# Patient Record
Sex: MALE | Race: WHITE | NOT HISPANIC OR LATINO | Employment: UNEMPLOYED | URBAN - METROPOLITAN AREA
[De-identification: names, ages, dates, MRNs, and addresses within clinical notes are randomized per-mention and may not be internally consistent; named-entity substitution may affect disease eponyms.]

---

## 2017-08-11 ENCOUNTER — OFFICE VISIT (OUTPATIENT)
Dept: RETAIL CLINIC | Facility: CLINIC | Age: 47
End: 2017-08-11

## 2017-08-11 DIAGNOSIS — Z02.83 ENCOUNTER FOR DRUG SCREENING: Primary | ICD-10-CM

## 2017-09-11 ENCOUNTER — OFFICE VISIT (OUTPATIENT)
Dept: RETAIL CLINIC | Facility: CLINIC | Age: 47
End: 2017-09-11

## 2017-09-11 DIAGNOSIS — Z02.1 PRE-EMPLOYMENT DRUG SCREENING: Primary | ICD-10-CM

## 2018-04-21 PROBLEM — S89.92XA KNEE INJURY, LEFT, INITIAL ENCOUNTER: Status: ACTIVE | Noted: 2018-04-21

## 2018-04-21 PROBLEM — S93.401A SPRAIN OF RIGHT ANKLE: Status: ACTIVE | Noted: 2018-04-21

## 2018-04-25 ENCOUNTER — OFFICE VISIT (OUTPATIENT)
Dept: ORTHOPEDIC SURGERY | Facility: CLINIC | Age: 48
End: 2018-04-25

## 2018-04-25 VITALS
SYSTOLIC BLOOD PRESSURE: 130 MMHG | HEIGHT: 74 IN | DIASTOLIC BLOOD PRESSURE: 88 MMHG | WEIGHT: 259.92 LBS | HEART RATE: 85 BPM | BODY MASS INDEX: 33.36 KG/M2

## 2018-04-25 DIAGNOSIS — M25.562 ACUTE PAIN OF LEFT KNEE: Primary | ICD-10-CM

## 2018-04-25 PROCEDURE — 99204 OFFICE O/P NEW MOD 45 MIN: CPT | Performed by: ORTHOPAEDIC SURGERY

## 2018-04-25 NOTE — PROGRESS NOTES
[facility] [place] [facility]opaedic Surgery Clinic Note    Subjective     Chief Complaint   Patient presents with   • Left Knee - Pain        HPI      Jeffery Olivas is a 48 y.o. male.  He complains a left knee pain.  He had a fall at home.  In his backyard.  He is unable to bear weight on the left knee.  This happened April 21.  He's tried ibuprofen.  His pain is aching burning and stabbing        History reviewed. No pertinent past medical history.   Past Surgical History:   Procedure Laterality Date   • ACHILLES TENDON SURGERY     • KNEE MENISCAL REPAIR Right       History reviewed. No pertinent family history.  Social History     Social History   • Marital status: Unknown     Spouse name: N/A   • Number of children: N/A   • Years of education: N/A     Occupational History   • Not on file.     Social History Main Topics   • Smoking status: Never Smoker   • Smokeless tobacco: Never Used   • Alcohol use Yes   • Drug use: Unknown   • Sexual activity: Defer     Other Topics Concern   • Not on file     Social History Narrative   • No narrative on file      Current Outpatient Prescriptions on File Prior to Visit   Medication Sig Dispense Refill   • ibuprofen (ADVIL,MOTRIN) 800 MG tablet Take 1 tablet by mouth Every 6 (Six) Hours As Needed for Moderate Pain . 30 tablet 0     No current facility-administered medications on file prior to visit.       No Known Allergies     The following portions of the patient's history were reviewed and updated as appropriate: allergies, current medications, past family history, past medical history, past social history, past surgical history and problem list.    Review of Systems   Constitutional: Negative.    HENT: Negative.    Eyes: Negative.    Respiratory: Negative.    Cardiovascular: Negative.    Gastrointestinal: Negative.    Endocrine: Negative.    Genitourinary: Negative.    Musculoskeletal: Positive for arthralgias.   Skin: Negative.    Allergic/Immunologic: Negative.    Neurological: Negative.   "  Hematological: Negative.    Psychiatric/Behavioral: Negative.         Objective      Physical Exam  /88   Pulse 85   Ht 188 cm (74\")   Wt 118 kg (259 lb 14.8 oz)   BMI 33.37 kg/m²     Body mass index is 33.37 kg/m².        GENERAL APPEARANCE: awake, alert & oriented x 3, in no acute distress and well developed, well nourished  PSYCH: normal mood and affect  LUNGS:  breathing nonlabored, no wheezing  EYES: sclera anicteric, pupils equal  CARDIOVASCULAR: palpable pulses dorsalis pedis, palpable posterior tibial bilaterally. Capillary refill less than 2 seconds  INTEGUMENTARY: skin intact, no clubbing, cyanosis  NEUROLOGIC: Unable to bear weight on the left knee but he has normal reflexes and sensation             Ortho Exam  Peripheral Vascular:    Upper Extremity:   Inspection:  Left--no cyanotic nail beds Right--no cyanotic nail beds   Bilateral:  Pink nail beds with brisk capillary refill   Palpation:  Bilateral radial pulse normal    Musculoskeletal:  Global Assessment:  Overall assessment of Lower Extremity Muscle Strength and Tone:  Left quadriceps--5/5   Left hamstrings--5/5       Left tibialis anterior--5/5  Left gastroc-soleus--5/5  Left EHL--5/5      Lower Extremity:  Knee/Patella:  No digital clubbing or cyanosis.    Examination of left knee reveals:  Normal deep tendon reflexes, coordination, strength, tone, sensation.  No known fractures or deformities.    Inspection and Palpation:    Left knee:  Tenderness:  Medial knee  Effusion:  none  Crepitus:  none  Pulses:  2+  Ecchymosis:  None  Warmth:  None       ROM:  Right:  Extension:0    Flexion:135  Left:  Extension:0     Flexion:135    Instability:    Left:  Lachman Test:  Negative, Varus stress test negative, Valgus stress test negative   Anterior Drawer Test:  Negative, Posterior Drawer Test:  Negative      Deformities/Malalignments/Discrepancies:    Left:  none  Right:  none    Functional Testing:    Left:  Aleksey's test:  " Negative  Patella grind test:  Negative  Q-angle:  Normal  Apprehension Sign:  Negative    Imaging/Studies  Imaging Results (last 7 days)     ** No results found for the last 168 hours. **      I reviewed his x-rays which are negative    Assessment/Plan        ICD-10-CM ICD-9-CM   1. Acute pain of left knee M25.562 719.46       Orders Placed This Encounter   Procedures   • MRI Knee Left Without Contrast    Since he is unable to bear weight on the left knee removed an MRI.  I ordered crutches and he wants forearm crutches.  This is a personal injury and I will write a work note for him to be off work    Medical Decision Making  Management Options : over-the-counter medicine  Data/Risk: radiology tests and independent visualization of imaging, lab tests, or EMG/NCV    Jett Sims MD  04/25/18  8:33 AM

## 2018-04-26 ENCOUNTER — HOSPITAL ENCOUNTER (OUTPATIENT)
Dept: MRI IMAGING | Facility: HOSPITAL | Age: 48
Discharge: HOME | End: 2018-04-26
Attending: ORTHOPAEDIC SURGERY | Admitting: ORTHOPAEDIC SURGERY

## 2018-04-26 DIAGNOSIS — M25.562 ACUTE PAIN OF LEFT KNEE: ICD-10-CM

## 2018-04-26 PROCEDURE — 73721 MRI JNT OF LWR EXTRE W/O DYE: CPT

## 2018-04-30 ENCOUNTER — OFFICE VISIT (OUTPATIENT)
Dept: ORTHOPEDIC SURGERY | Facility: CLINIC | Age: 48
End: 2018-04-30

## 2018-04-30 VITALS
HEART RATE: 82 BPM | HEIGHT: 74 IN | WEIGHT: 260.14 LBS | DIASTOLIC BLOOD PRESSURE: 87 MMHG | BODY MASS INDEX: 33.39 KG/M2 | SYSTOLIC BLOOD PRESSURE: 135 MMHG

## 2018-04-30 DIAGNOSIS — S83.232D COMPLEX TEAR OF MEDIAL MENISCUS OF LEFT KNEE AS CURRENT INJURY, SUBSEQUENT ENCOUNTER: Primary | ICD-10-CM

## 2018-04-30 DIAGNOSIS — M94.20 CHONDROMALACIA: ICD-10-CM

## 2018-04-30 DIAGNOSIS — S83.272D COMPLEX TEAR OF LATERAL MENISCUS OF LEFT KNEE AS CURRENT INJURY, SUBSEQUENT ENCOUNTER: ICD-10-CM

## 2018-04-30 PROCEDURE — 99214 OFFICE O/P EST MOD 30 MIN: CPT | Performed by: ORTHOPAEDIC SURGERY

## 2018-04-30 NOTE — PROGRESS NOTES
[facility] [place] Orthopaedic Surgery Clinic Note    Subjective     Chief Complaint   Patient presents with   • Left Knee - Follow-up     1 week follow up for MRI results        HPI      Jeffery Olivas is a 48 y.o. male.  He had personal injury 9 days ago.  Complains left knee pain.  He walks with a limp.  Pain is 9 out of 10.  It is aching burning stabbing.  He works at [employer].        History reviewed. No pertinent past medical history.   Past Surgical History:   Procedure Laterality Date   • ACHILLES TENDON SURGERY     • KNEE MENISCAL REPAIR Right       History reviewed. No pertinent family history.  Social History     Social History   • Marital status: Unknown     Spouse name: N/A   • Number of children: N/A   • Years of education: N/A     Occupational History   • Not on file.     Social History Main Topics   • Smoking status: Never Smoker   • Smokeless tobacco: Never Used   • Alcohol use Yes   • Drug use: Unknown   • Sexual activity: Defer     Other Topics Concern   • Not on file     Social History Narrative   • No narrative on file      Current Outpatient Prescriptions on File Prior to Visit   Medication Sig Dispense Refill   • ibuprofen (ADVIL,MOTRIN) 800 MG tablet Take 1 tablet by mouth Every 6 (Six) Hours As Needed for Moderate Pain . 30 tablet 0     No current facility-administered medications on file prior to visit.       No Known Allergies     The following portions of the patient's history were reviewed and updated as appropriate: allergies, current medications, past family history, past medical history, past social history, past surgical history and problem list.    Review of Systems   Constitutional: Negative.    HENT: Negative.    Eyes: Negative.    Respiratory: Negative.    Cardiovascular: Negative.    Gastrointestinal: Negative.    Endocrine: Negative.    Genitourinary: Negative.    Musculoskeletal: Positive for arthralgias.   Skin: Negative.    Allergic/Immunologic: Negative.    Neurological: Negative.   "  Hematological: Negative.    Psychiatric/Behavioral: Negative.         Objective      Physical Exam  /87   Pulse 82   Ht 188 cm (74.02\")   Wt 118 kg (260 lb 2.3 oz)   BMI 33.39 kg/m²     Body mass index is 33.39 kg/m².        GENERAL APPEARANCE: awake, alert & oriented x 3, in no acute distress and well developed, well nourished  PSYCH: normal mood and affect  LUNGS:  breathing nonlabored, no wheezing  EYES: sclera anicteric, pupils equal  CARDIOVASCULAR: palpable pulses dorsalis pedis, palpable posterior tibial bilaterally. Capillary refill less than 2 seconds  INTEGUMENTARY: skin intact, no clubbing, cyanosis  NEUROLOGIC:  He limps on his left leg but has normal reflexes and sensation             Ortho Exam  Peripheral Vascular:    Upper Extremity:   Inspection:  Left--no cyanotic nail beds Right--no cyanotic nail beds   Bilateral:  Pink nail beds with brisk capillary refill   Palpation:  Bilateral radial pulse normal    Musculoskeletal:  Global Assessment:  Overall assessment of Lower Extremity Muscle Strength and Tone:  Left quadriceps--5/5   Left hamstrings--5/5       Left tibialis anterior--5/5  Left gastroc-soleus--5/5  Left EHL--5/5      Lower Extremity:  Knee/Patella:  No digital clubbing or cyanosis.    Examination of left knee reveals:  Normal deep tendon reflexes, coordination, strength, tone, sensation.  No known fractures or deformities.    Inspection and Palpation:    Left knee:  Tenderness:  Medial joint line with antalgic gait  Effusion:  none  Crepitus:  none  Pulses:  2+  Ecchymosis:  None  Warmth:  None       ROM:  Right:  Extension:0    Flexion:135  Left:  Extension:0     Flexion:135    Instability:    Left:  Lachman Test:  Negative, Varus stress test negative, Valgus stress test negative   Anterior Drawer Test:  Negative, Posterior Drawer Test:  Negative      Deformities/Malalignments/Discrepancies:    Left:  none  Right:  none    Functional Testing:    Left:  Aleksey's test:  " Positive  Patella grind test:  Negative  Q-angle:  Normal  Apprehension Sign:  Negative    Imaging/Studies  Imaging Results (last 7 days)     ** No results found for the last 168 hours. **        I reviewed the MRI which shows anterior horn and posterior horn medial meniscus tears with lateral meniscus tear and chondromalacia  Assessment/Plan        ICD-10-CM ICD-9-CM   1. Complex tear of medial meniscus of left knee as current injury, subsequent encounter S83.232D V58.89   2. Complex tear of lateral meniscus of left knee as current injury, subsequent encounter S83.272D V58.89   3. Chondromalacia M94.20 733.92       I recommend left knee arthroscopy with partial medial and lateral meniscectomies.  He is off work.  He is on crutches.  Treatment options and alternatives were discussed with patient.  Surgical versus non surgical treatment options were discussed as well.    We reviewed the nature of the planned procedure including the risks, benefits and potential complications.  Understanding was expressed and the decision was made to proceed with surgery.      Medical Decision Making  Management Options : over-the-counter medicine and major surgery with risk factors  Data/Risk: radiology tests and independent visualization of imaging, lab tests, or EMG/NCV    Jett Sims MD  04/30/18  8:27 AM

## 2018-05-08 ENCOUNTER — TELEPHONE (OUTPATIENT)
Dept: ORTHOPEDIC SURGERY | Facility: CLINIC | Age: 48
End: 2018-05-08

## 2018-05-08 NOTE — TELEPHONE ENCOUNTER
----- Message from Jeffery Olivas sent at 5/8/2018 11:22 AM EDT -----  Regarding: Visit Follow-Up Question  Contact: [phone]  Good Morning,       On 4/25, when I first saw Dr. Sims for my knee, I dropped off Disability and Insurance forms for my employer.  I'm getting concerned because my Leave Of Absence and Disability Coverage is dependent on returning those forms to my employer.  I have not heard back on the completion of those forms.  PLEASE HELP!         On a different note, due to the nature of my surgery, the type of work I do ( for [employer]), and the amount of hours I spend on my feet during the work day; Dr. Sims felt I would need 6 weeks of recovery time.  I would ask for my return date to be Saturday, 6/23.  This would greatly simplify things since our scheduled work week runs Saturday - Friday.  Thanks for your time,  Jeffery Olivas

## 2018-05-10 ENCOUNTER — OUTSIDE FACILITY SERVICE (OUTPATIENT)
Dept: ORTHOPEDIC SURGERY | Facility: CLINIC | Age: 48
End: 2018-05-10

## 2018-05-10 PROCEDURE — 29880 ARTHRS KNE SRG MNISECTMY M&L: CPT | Performed by: ORTHOPAEDIC SURGERY

## 2018-05-11 ENCOUNTER — TELEPHONE (OUTPATIENT)
Dept: ORTHOPEDIC SURGERY | Facility: CLINIC | Age: 48
End: 2018-05-11

## 2018-05-11 NOTE — TELEPHONE ENCOUNTER
----- Message from Jeffery Olivas sent at 5/11/2018  9:40 AM EDT -----  Regarding: Visit Follow-Up Question  Contact: [phone]  I sent the email below on 5/8 and haven't heard back?  PLEASE HELP!    On 4/25, when I first saw Dr. Sims for my knee, I dropped off Disability and Insurance forms for my employer. I'm getting concerned because my Leave Of Absence and Disability Coverage is dependent on returning those forms to my employer. I have not heard back on the completion of those forms. PLEASE HELP!    On a different note, due to the nature of my surgery, the type of work I do ( for [employer]), and the amount of hours I spend on my feet during the work day; Dr. Sims felt I would need 6 weeks of recovery time. I would ask for my return date to be Saturday, 6/23. This would greatly simplify things since our scheduled work week begins on Saturday.

## 2018-05-11 NOTE — TELEPHONE ENCOUNTER
----- Message from Jeffery Olivas sent at 5/11/2018 10:32 AM EDT -----  Regarding: Visit Follow-Up Question  Contact: [phone]  In follow-up to my emails from 5/8 and this morning 5/11, I've just spoken with my Disability and Supplemental Insurance Carriers and they need:    Attending Physicians Statement  Office Visit Notes with [facility]    If these are not received by 5/13, I will not receive Disability Income!  PLEASE HELP!    Thanks so much,  Jeffery Olivas  [phone]

## 2018-05-18 ENCOUNTER — OFFICE VISIT (OUTPATIENT)
Dept: ORTHOPEDIC SURGERY | Facility: CLINIC | Age: 48
End: 2018-05-18

## 2018-05-18 ENCOUNTER — TELEPHONE (OUTPATIENT)
Dept: ORTHOPEDIC SURGERY | Facility: CLINIC | Age: 48
End: 2018-05-18

## 2018-05-18 DIAGNOSIS — S83.232D COMPLEX TEAR OF MEDIAL MENISCUS OF LEFT KNEE AS CURRENT INJURY, SUBSEQUENT ENCOUNTER: ICD-10-CM

## 2018-05-18 DIAGNOSIS — S83.272D COMPLEX TEAR OF LATERAL MENISCUS OF LEFT KNEE AS CURRENT INJURY, SUBSEQUENT ENCOUNTER: ICD-10-CM

## 2018-05-18 DIAGNOSIS — Z98.890 STATUS POST ARTHROSCOPY OF LEFT KNEE: Primary | ICD-10-CM

## 2018-05-18 PROCEDURE — 99024 POSTOP FOLLOW-UP VISIT: CPT | Performed by: ORTHOPAEDIC SURGERY

## 2018-05-18 NOTE — TELEPHONE ENCOUNTER
----- Message from Jeffery Olivas sent at 5/18/2018  9:52 AM EDT -----  Regarding: Visit Follow-Up Question  Contact: [phone]  Hello,       I forgot to ask a couple questions when I was in for Post Op follow up today.  Can or should I remove the tape strips from my knee?  Are there stiches or staples to be removed?  How soon can I submerge the knee in a bath or hot tub?       Also, I dropped of hard copies of the Leave Of Absence and Disability forms I emailed yesterday.  These need to be filled out ASAP or I risk loosing my Leave Of Absence and Disability which expires today.  Thanks for your help!       Thanks, Jeffery

## 2018-05-18 NOTE — PROGRESS NOTES
Chief Complaint   Patient presents with   • Post-op     1 week s/p (L) knee arthroscopy with partial medial and lateral menisectomies 05/10/2018           HPI    He is follow-up left knee arthroscopy and doing well.    There were no vitals filed for this visit.      Physical Exam:  His left knee looks good.  Minimal swelling.  He has a negative Homans sign.  He is neurovascular intact.  He is walking with a limp.          ICD-10-CM ICD-9-CM   1. Status post arthroscopy of left knee Z98.890 V45.89   2. Complex tear of medial meniscus of left knee as current injury, subsequent encounter S83.232D V58.89   3. Complex tear of lateral meniscus of left knee as current injury, subsequent encounter S83.272D V58.89       Orders Placed This Encounter   Procedures   • Ambulatory Referral to Physical Therapy     He will go to physical therapy.  His restriction is seated job only or off work.  Anticipate return to full duty June 23

## 2018-05-18 NOTE — TELEPHONE ENCOUNTER
"Spoke to patient and informed him of exactly what Dr. Sims said:   \"The tape strips stay on until a fall off.  The sutures are underneath the skin and is off.  She can shower in 48 hours no bath or submerge in water for 3 weeks.  The paperwork will be done at the office.\"    He need his forms filled out as soon as possible. Please give him call when those are done.  "

## 2018-05-18 NOTE — TELEPHONE ENCOUNTER
PT DROPPED OFF [facility] PAPERWORK. HE PAID $15. THE BLANK COPY IS SCANNED IN HIS CHART.

## 2018-05-21 ENCOUNTER — TELEPHONE (OUTPATIENT)
Dept: ORTHOPEDIC SURGERY | Facility: CLINIC | Age: 48
End: 2018-05-21

## 2018-05-21 NOTE — TELEPHONE ENCOUNTER
----- Message from Jeffery Olivas sent at 5/21/2018  8:59 AM EDT -----  Regarding: Visit Follow-Up Question  Contact: [phone]  Good Morning,       I dropped of Disability and Leave forms last week and was told to follow up with Jayne (heather?) this morning.  I must turn these in ASAP or I risk loosing my Leave and Disability Pay Status.  When I spoke with Dr. Sims on Friday, we spoke of my returning to work on 6/23.  Please Help!  Thanks So Much,  Jeffery Olivas  [phone]

## 2018-05-22 ENCOUNTER — HOSPITAL ENCOUNTER (OUTPATIENT)
Dept: PHYSICAL THERAPY | Facility: HOSPITAL | Age: 48
Setting detail: THERAPIES SERIES
Discharge: HOME | End: 2018-05-22

## 2018-05-22 DIAGNOSIS — M25.562 LEFT KNEE PAIN, UNSPECIFIED CHRONICITY: Primary | ICD-10-CM

## 2018-05-22 DIAGNOSIS — Z98.890 STATUS POST ARTHROSCOPY OF LEFT KNEE: ICD-10-CM

## 2018-05-22 PROCEDURE — 97110 THERAPEUTIC EXERCISES: CPT

## 2018-05-22 PROCEDURE — 97161 PT EVAL LOW COMPLEX 20 MIN: CPT

## 2018-05-22 NOTE — THERAPY EVALUATION
Outpatient Physical Therapy Ortho Initial Evaluation   [place]     Patient Name: Jeffery Olivas  : 1970  MRN: [id number]  Today's Date: 2018      Visit Date: 2018    Patient Active Problem List   Diagnosis   • Knee injury, left, initial encounter   • Sprain of right ankle        No past medical history on file.     Past Surgical History:   Procedure Laterality Date   • ACHILLES TENDON SURGERY     • KNEE MENISCAL REPAIR Right        Visit Dx:     ICD-10-CM ICD-9-CM   1. Left knee pain, unspecified chronicity M25.562 719.46             Patient History     Row Name 18 1000             History    Date Current Problem(s) Began 18  -LF      Brief Description of Current Complaint Presents s/p L knee scope on May 10 w/ partial medial and lateral meniscectomies.  Was using crutches until his post-op follow-up last week.  Used cold pack for the first week.  Elevates if has increased swelling.  Continues with pain and some swelling.  Injury occured  when he stepped off patio onto unstable ground.    -LF      Patient/Caregiver Goals Relieve pain;Return to work;Improve strength;Know what to do to help the symptoms  -LF      Occupation/sports/leisure  at [employer]; goal is to retun to work   -LF         Pain     Pain Location Knee  -LF      Pain at Present 2  -LF      Pain at Best 2  -LF      Pain at Worst 8  -LF      Pain Description Aching;Dull  -LF      What Performance Factors Make the Current Problem(s) WORSE? increased walking and activity  -LF      What Performance Factors Make the Current Problem(s) BETTER? rest  -LF         Fall Risk Assessment    Any falls in the past year: Yes  -LF      Number of falls reported in the last 12 months 1  -LF      Factors that contributed to the fall: Uneven surface  -LF      Does patient have a fear of falling --   stepped off sidewalk onto unstable ground  -LF         Daily Activities    Primary Language English  -LF       Barriers to learning None  -LF      Pt Participated in POC and Goals Yes  -LF        User Key  (r) = Recorded By, (t) = Taken By, (c) = Cosigned By    Initials Name Provider Type    LF Ludmila Matamoros, DEEPAK Physical Therapist                PT Ortho     Row Name 05/22/18 1000       Special Tests/Palpation    Special Tests/Palpation Knee  -LF       Knee Palpation    Knee Palpation? Yes   generalized tenderness L knee; mild edema  -LF       General ROM    RT Lower Ext Rt Knee Extension/Flexion  -LF    LT Lower Ext Lt Knee Extension/Flexion  -LF       Right Lower Ext    Rt Knee Extension/Flexion AROM 0-135  -LF       Left Lower Ext    Lt Knee Extension/Flexion AROM 0-115  -LF    LT Lower Extremity Comments pain with full knee extension  -LF       MMT (Manual Muscle Testing)    Additional Documentation General Assessment (Manual Muscle Testing) (Group)  -LF       General Assessment (Manual Muscle Testing)    General Manual Muscle Testing (MMT) Assessment lower extremity strength deficits identified  -LF       Lower Extremity (Manual Muscle Testing)    Lower Extremity: Manual Muscle Testing (MMT) left knee strength deficit  -LF    Comment, MMT: Lower Extremity 5/5 RLE  -LF       Left Knee (Manual Muscle Testing)    Left Knee Manual Muscle Testing (MMT) extension;flexion  -LF    MMT: Extension, Left Knee extension  -LF    MMT, Gross Movement: Left Knee Extension (4-/5) good minus  -LF    MMT: Flexion, Left Knee flexion  -LF    MMT, Gross Movement: Left Knee Flexion (4-/5) good minus  -LF    Comment, Left Knee: Manual Muscle Testing (MMT) limited by pain  -LF       Girth    Girth Measured? Left Lower Extremity;Right Lower Extremity  -LF       RLE Quick Girth (cm)    Largest calf 36.8 cm  -LF    Tib tuberosity 36.7 cm  -LF    Mid patella 38 cm  -LF    Other 1 41 cm  -LF       LLE Quick Girth (cm)    Largest calf 40.2 cm  -LF    Tib tuberosity 38.7 cm  -LF    Mid patella 39.5 cm  -LF    Other 1 43 cm  -LF       Gait/Stairs  Assessment/Training    Ada Level (Gait) independent   antalgic  -LF    Assistive Device (Gait) --   none  -LF    Deviations/Abnormal Patterns (Gait) left sided deviations;base of support, wide;gait speed decreased  -LF    Left Sided Gait Deviations heel strike decreased;weight shift ability decreased  -LF      User Key  (r) = Recorded By, (t) = Taken By, (c) = Cosigned By    Initials Name Provider Type    LF Ludmila Matamoros PT Physical Therapist          Therapy Education  Education Details: HEP for QS, SLR, SAQ, heel slides, supine wall slides, heel props, hip add isometric, star step weight shift.  Also encouraged cont'd use of ice to help with pain and swelling  Given: HEP, Symptoms/condition management  Program: New  How Provided: Verbal, Demonstration  Provided to: Patient  Level of Understanding: Teach back education performed           PT OP Goals     Row Name 05/22/18 1000          PT Short Term Goals    STG Date to Achieve 06/12/18  -LF     STG 1 Patient to report 50% decrease in L knee pain  -LF     STG 2 Left knee flexion improved to 130 degrees or better  -LF     STG 3 Patient to demonstrate non-antalgic gait without deviations  -LF        Long Term Goals    LTG Date to Achieve 07/03/18  -LF     LTG 1 Patient independent with HEP for progression of strength and ROM, management of symptoms  -LF     LTG 2 Patient will return to previous functional status for ADLs/ vocational/recreational/sports activities as identified by patient.  -LF     LTG 3 LEFS score improved to 60/80 or better  -LF     LTG 4 Patient will increase hip and knee strength to 5/5 to improve functional mobility  -LF        Time Calculation    PT Goal Re-Cert Due Date 08/20/18  -LF       User Key  (r) = Recorded By, (t) = Taken By, (c) = Cosigned By    Initials Name Provider Type     Ludmila Matamoros PT Physical Therapist                PT Assessment/Plan     Row Name 05/22/18 1000          PT Assessment    Functional  Limitations Impaired gait;Limitation in home management;Performance in leisure activities;Performance in self-care ADL;Performance in sport activities;Performance in work activities  -LF     Impairments Range of motion;Muscle strength;Pain  -LF     Assessment Comments Patient presents with pain, swelling, decreased strength, and decreased ROM s/p L knee scope with partial medial and lateral meniscecotmy.  Further treatment indicated to help decrease pain, and address deficits to assist return to prior functional level.    -LF     Please refer to paper survey for additional self-reported information Yes  -LF     Rehab Potential Fair  -LF     Patient/caregiver participated in establishment of treatment plan and goals Yes  -LF     Patient would benefit from skilled therapy intervention Yes  -LF        PT Plan    PT Frequency 2x/week  -LF     Predicted Duration of Therapy Intervention (OT Eval) 6-8 weeks  -LF     Planned CPT's? PT EVAL LOW COMPLEXITY: 46414;PT THER PROC EA 15 MIN: 03033;PT MANUAL THERAPY EA 15 MIN: 31509;PT NEUROMUSC RE-EDUCATION EA 15 MIN: 29407;PT GAIT TRAINING EA 15 MIN: 50359;PT ELECTRICAL STIM UNATTEND: ;PT ELECTRICAL STIM ATTD EA 15 MIN: 92580;PT HOT/COLD PACK WC NONMCARE: 57659  -LF     PT Plan Comments Patient plans to follow-up 1-2 weeks to progress HEP.  He says he wants to do as much as he can at home  -LF       User Key  (r) = Recorded By, (t) = Taken By, (c) = Cosigned By    Initials Name Provider Type     Ludmila Matamoros, PT Physical Therapist                  Exercises     Row Name 05/22/18 1000             Exercise 1    Exercise Name 1 Ther ex per HEP provided today  -LF      Time 1 10  -LF        User Key  (r) = Recorded By, (t) = Taken By, (c) = Cosigned By    Initials Name Provider Type     Ludmila Matamoros, PT Physical Therapist                        Outcome Measure Options: Lower Extremity Functional Scale (LEFS)  Lower Extremity Functional Index  Any of your usual work,  housework or school activities: Extreme difficulty or unable to perform activity  Your usual hobbies, recreational or sporting activities: Extreme difficulty or unable to perform activity  Getting into or out of the bath: Extreme difficulty or unable to perform activity  Walking between rooms: Quite a bit of difficulty  Putting on your shoes or socks: Moderate difficulty  Squatting: Extreme difficulty or unable to perform activity  Lifting an object, like a bag of groceries from the floor: Moderate difficulty  Performing light activities around your home: Quite a bit of difficulty  Performing heavy activities around your home: Extreme difficulty or unable to perform activity  Getting into or out of a car: Quite a bit of difficulty  Walking 2 blocks: Extreme difficulty or unable to perform activity  Walking a mile: Extreme difficulty or unable to perform activity  Going up or down 10 stairs (about 1 flight of stairs): Quite a bit of difficulty  Standing for 1 hour: Extreme difficulty or unable to perform activity  Sitting for 1 hour: No difficulty  Running on even ground: Extreme difficulty or unable to perform activity  Running on uneven ground: Extreme difficulty or unable to perform activity  Making sharp turns while running fast: Extreme difficulty or unable to perform activity  Hopping: Extreme difficulty or unable to perform activity  Rolling over in bed: No difficulty  Total: 16      Time Calculation:   Start Time: 1000  Total Timed Code Minutes- PT: 10 minute(s)     Therapy Charges for Today     Code Description Service Date Service Provider Modifiers Qty    46872266642 HC PT EVAL LOW COMPLEXITY 4 5/22/2018 Ludmila Matamoros, PT GP 1    92979979095 HC PT THER PROC EA 15 MIN 5/22/2018 Ludmila Matamoros, PT GP 1          PT G-Codes  Outcome Measure Options: Lower Extremity Functional Scale (LEFS)         Ludmila Matamoros, PT  5/22/2018

## 2018-06-05 ENCOUNTER — APPOINTMENT (OUTPATIENT)
Dept: PHYSICAL THERAPY | Facility: HOSPITAL | Age: 48
End: 2018-06-05

## 2018-06-08 ENCOUNTER — OFFICE VISIT (OUTPATIENT)
Dept: ORTHOPEDIC SURGERY | Facility: CLINIC | Age: 48
End: 2018-06-08

## 2018-06-08 DIAGNOSIS — Z98.890 STATUS POST ARTHROSCOPY OF LEFT KNEE: Primary | ICD-10-CM

## 2018-06-08 PROCEDURE — 99024 POSTOP FOLLOW-UP VISIT: CPT | Performed by: ORTHOPAEDIC SURGERY

## 2018-06-08 NOTE — PROGRESS NOTES
Chief Complaint   Patient presents with   • Post-op Follow-up     4 weeks s/p (L) knee arthroscopy with partial medial and lateral menisectomies 05/10/2018           HPI  He is doing well status post left knee arthroscopy for May 10.      There were no vitals filed for this visit.      Physical Exam:  He has good motion and strength with no sign of infection.  He has negative Homans sign.      ICD-10-CM ICD-9-CM   1. Status post arthroscopy of left knee Z98.890 V45.89     He will return to work June 23 without restriction.  He is off work until then.  He is not Worker's Comp.

## 2018-07-05 ENCOUNTER — DOCUMENTATION (OUTPATIENT)
Dept: PHYSICAL THERAPY | Facility: HOSPITAL | Age: 48
End: 2018-07-05

## 2018-07-05 DIAGNOSIS — M25.562 LEFT KNEE PAIN, UNSPECIFIED CHRONICITY: Primary | ICD-10-CM

## 2018-07-05 DIAGNOSIS — Z98.890 STATUS POST ARTHROSCOPY OF LEFT KNEE: ICD-10-CM

## 2018-12-19 PROBLEM — R07.81 RIB PAIN ON LEFT SIDE: Status: ACTIVE | Noted: 2018-12-19

## 2018-12-24 ENCOUNTER — HOSPITAL ENCOUNTER (OUTPATIENT)
Dept: GENERAL RADIOLOGY | Facility: HOSPITAL | Age: 48
Discharge: HOME | End: 2018-12-24
Admitting: NURSE PRACTITIONER

## 2018-12-24 ENCOUNTER — OFFICE VISIT (OUTPATIENT)
Dept: INTERNAL MEDICINE | Facility: CLINIC | Age: 48
End: 2018-12-24

## 2018-12-24 VITALS
HEART RATE: 96 BPM | OXYGEN SATURATION: 97 % | SYSTOLIC BLOOD PRESSURE: 128 MMHG | BODY MASS INDEX: 33.62 KG/M2 | HEIGHT: 74 IN | WEIGHT: 262 LBS | DIASTOLIC BLOOD PRESSURE: 82 MMHG

## 2018-12-24 DIAGNOSIS — K59.00 CONSTIPATION, UNSPECIFIED CONSTIPATION TYPE: Primary | ICD-10-CM

## 2018-12-24 DIAGNOSIS — R07.81 RIB PAIN ON LEFT SIDE: ICD-10-CM

## 2018-12-24 PROCEDURE — 99214 OFFICE O/P EST MOD 30 MIN: CPT | Performed by: NURSE PRACTITIONER

## 2018-12-24 PROCEDURE — 71101 X-RAY EXAM UNILAT RIBS/CHEST: CPT

## 2018-12-24 PROCEDURE — 74018 RADEX ABDOMEN 1 VIEW: CPT

## 2018-12-24 NOTE — PROGRESS NOTES
Please call patient chest x-ray does not show any rib fractures however sometimes rib fractures do not show up on plain films.  If you would like we can schedule a CAT scan of your chest.

## 2018-12-24 NOTE — PROGRESS NOTES
Answers for HPI/ROS submitted by the patient on 12/23/2018   Abdominal pain  Chronicity: new  Onset: in the past 7 days  Onset quality: sudden  Frequency: constantly  Episode duration: 7 days  Progression since onset: unchanged  Pain location: LUQ  Pain - numeric: 8/10  Pain quality: aching, dull, a sensation of fullness, sharp  Radiates to: LUQ  anorexia: Yes  arthralgias: No  belching: No  constipation: Yes  diarrhea: No  dysuria: No  fever: No  flatus: No  frequency: No  headaches: No  hematochezia: No  hematuria: No  melena: No  myalgias: No  nausea: No  weight loss: No  vomiting: No  Aggravated by: belching, bowel movement, certain positions, coughing, deep breathing, movement  Relieved by: being still, certain positions, recumbency  Subjective   Jeffery Olivas is a 48 y.o. male.   Chief Complaint   Patient presents with   • Chest Pain     Rib, Left side      History of Present Illness Fell 7days ago landed on left side injuring left side of lower  chest, and left thumb.  Went to work on Tuesday.  Went to urgent care at [facility] on Wednesday diag with CWP and constipation.  Tried miralax .  Feels bloated.  Can't eat, sitting upright makes pain worse.  Went back to urgent care yesterday -was urged to FU here.  Not taking anything for the discomfort.   Patient denies fever chills, headache, ear pain, sore throat.  Hurts to take a deep breath but no shortness of air at rest.  No cough,  has constant left side chest pain.   No abdominal pain, nausea, vomiting.  Is having liquid BM, no formed stool.        The following portions of the patient's history were reviewed and updated as appropriate: allergies, current medications, past family history, past medical history, past social history, past surgical history and problem list.    Current Outpatient Medications:   •  ibuprofen (ADVIL,MOTRIN) 800 MG tablet, Take 1 tablet by mouth Every 6 (Six) Hours As Needed for Moderate Pain ., Disp: 30 tablet, Rfl: 0    Review of  "Systems  Consitutional, HEENT, Respiratory, CV, GI, , Skin, Musculoskeletal, Neuro-mental, Endocrinological, Hematological were reviewed.  Positives were discussed in the HPI, otherwise ROS was negative   /82   Pulse 96   Ht 188 cm (74\")   Wt 119 kg (262 lb)   SpO2 97%   BMI 33.64 kg/m²     Objective    Allergies   Allergen Reactions   • Codeine GI Intolerance       Physical Exam   Constitutional: He is oriented to person, place, and time. He appears well-developed and well-nourished.   Appears uncomfortable with movement.   HENT:   Head: Normocephalic.   Eyes: Right eye exhibits no discharge. Left eye exhibits no discharge. No scleral icterus.   Neck: Neck supple. No thyromegaly present.   Cardiovascular: Normal rate, regular rhythm, normal heart sounds and intact distal pulses. Exam reveals no gallop and no friction rub.   No murmur heard.  Pulmonary/Chest: Effort normal and breath sounds normal. No stridor. No respiratory distress. He has no wheezes. He has no rales. He exhibits tenderness.   Left lower chest, ant     Abdominal: Soft. Bowel sounds are normal. He exhibits no mass.   Mild LQ tenderness.  No HSM   Lymphadenopathy:     He has no cervical adenopathy.   Neurological: He is alert and oriented to person, place, and time.   Skin: Skin is warm and dry. Capillary refill takes less than 2 seconds.   Is pink, no rash    Nursing note and vitals reviewed.      Procedures    LABS  No results found for this or any previous visit.    Assessment/Plan   Jeffery was seen today for chest pain.    Diagnoses and all orders for this visit:    Constipation, unspecified constipation type  -     XR Abdomen KUB    Rib pain on left side  -     XR ribs left w pa chest          Patient Instructions   Warm moist cloths to chest wall.  Splint area when coughing.  Remember to take good deep cleansing breaths every couple of hours.  Stay active.  Take your Motrin as discussed.  May use Tylenol.  Use magnesium citrate to " relieve constipation.  Continue the MiraLAX.  We will obtain a stat chest x-ray with left rib detail and KUB.  If symptoms worsen go to the emergency department.  Pt verbalizes understanding and agreement with plan of care.       EMR Dragon/transcription disclaimer:  Please note that portions of this note were completed with a voice recognition program.  Electronic transcription of the voice recognition program may permit erroneous words or phrases to be inadvertently transcribed.  Although I have reviewed the note for such errors, some may still exist in this documentation   Silvia Philip, APRN

## 2018-12-24 NOTE — PATIENT INSTRUCTIONS
Warm moist cloths to chest wall.  Splint area when coughing.  Remember to take good deep cleansing breaths every couple of hours.  Stay active.  Take your Motrin as discussed.  May use Tylenol.  Use magnesium citrate to relieve constipation.  Continue the MiraLAX.  We will obtain a stat chest x-ray with left rib detail and KUB.  If symptoms worsen go to the emergency department.  Pt verbalizes understanding and agreement with plan of care.

## 2018-12-26 DIAGNOSIS — R10.84 GENERALIZED ABDOMINAL PAIN: Primary | ICD-10-CM

## 2018-12-26 NOTE — PROGRESS NOTES
Please call patient I did get the radiologist to review his films from the urgent care visit.  The radiologist did not see any evidence of constipation.  We will go ahead and proceed with a CAT scan.  I will try to get it tomorrow

## 2018-12-27 ENCOUNTER — TELEPHONE (OUTPATIENT)
Dept: INTERNAL MEDICINE | Facility: CLINIC | Age: 48
End: 2018-12-27

## 2018-12-27 NOTE — TELEPHONE ENCOUNTER
Regarding: Visit Follow-Up Question  Contact: [phone]  ----- Message from "[employer] Generic sent at 12/26/2018  6:18 PM EST -----    Mina Vega,       I spoke with you and your office earlier and was expecting to hear back about scheduling a CT Scan.  Should I be prepared for the test Thursday Morning?  Thanks so much,  Jeffery Olivas

## 2018-12-29 ENCOUNTER — HOSPITAL ENCOUNTER (OUTPATIENT)
Dept: CT IMAGING | Facility: HOSPITAL | Age: 48
Discharge: HOME | End: 2018-12-29
Admitting: NURSE PRACTITIONER

## 2018-12-29 DIAGNOSIS — R10.84 GENERALIZED ABDOMINAL PAIN: ICD-10-CM

## 2018-12-29 PROCEDURE — 74177 CT ABD & PELVIS W/CONTRAST: CPT

## 2018-12-29 PROCEDURE — 25010000002 IOPAMIDOL 61 % SOLUTION: Performed by: NURSE PRACTITIONER

## 2018-12-29 RX ADMIN — BARIUM SULFATE 450 ML: 21 SUSPENSION ORAL at 10:41

## 2018-12-29 RX ADMIN — IOPAMIDOL 85 ML: 612 INJECTION, SOLUTION INTRAVENOUS at 10:41

## 2019-01-02 ENCOUNTER — OFFICE VISIT (OUTPATIENT)
Dept: INTERNAL MEDICINE | Facility: CLINIC | Age: 49
End: 2019-01-02

## 2019-01-02 VITALS
HEIGHT: 74 IN | OXYGEN SATURATION: 98 % | DIASTOLIC BLOOD PRESSURE: 78 MMHG | SYSTOLIC BLOOD PRESSURE: 118 MMHG | HEART RATE: 84 BPM | TEMPERATURE: 98.1 F | WEIGHT: 269.6 LBS | BODY MASS INDEX: 34.6 KG/M2

## 2019-01-02 DIAGNOSIS — R10.11 ABDOMINAL PAIN, BILATERAL UPPER QUADRANT: Primary | ICD-10-CM

## 2019-01-02 DIAGNOSIS — R10.12 ABDOMINAL PAIN, BILATERAL UPPER QUADRANT: Primary | ICD-10-CM

## 2019-01-02 PROCEDURE — 99213 OFFICE O/P EST LOW 20 MIN: CPT | Performed by: NURSE PRACTITIONER

## 2019-01-02 RX ORDER — OMEPRAZOLE 20 MG/1
20 CAPSULE, DELAYED RELEASE ORAL DAILY
Qty: 30 CAPSULE | Refills: 1 | Status: SHIPPED | OUTPATIENT
Start: 2019-01-02 | End: 2019-01-10 | Stop reason: HOSPADM

## 2019-01-02 RX ORDER — IBUPROFEN 800 MG/1
800 TABLET ORAL EVERY 6 HOURS PRN
Qty: 30 TABLET | Refills: 0 | Status: SHIPPED | OUTPATIENT
Start: 2019-01-02 | End: 2019-01-03

## 2019-01-02 NOTE — PROGRESS NOTES
"Flavio Olivas is a 48 y.o. male.   Chief Complaint   Patient presents with   • Rib Pain   • Abdominal Pain   • Bloated      History of Present Illness Reports Left lower rib pain is better but continues to have bloating, belching, upper abd pain.  Worsens with lying on back.  Sitting at a 45% angle seems to help. No SOA.  No appetite, NV.  Unsure if food trigger.   Has heartburn.  Bowels moving regularly.  No blood in stool.      The following portions of the patient's history were reviewed and updated as appropriate: allergies, current medications, past family history, past medical history, past social history, past surgical history and problem list.    Current Outpatient Medications:   •  omeprazole (priLOSEC) 20 MG capsule, Take 1 capsule by mouth Daily., Disp: 30 capsule, Rfl: 1    Review of Systems Consitutional, HEENT, Respiratory, CV, GI, , Skin, Musculoskeletal, Neuro-mental, Endocrinological, Hematological were reviewed.  Positives were discussed in the HPI, otherwise ROS was negative   /78   Pulse 84   Temp 98.1 °F (36.7 °C)   Ht 188 cm (74\")   Wt 122 kg (269 lb 9.6 oz)   SpO2 98%   BMI 34.61 kg/m²     Objective   Allergies   Allergen Reactions   • Codeine GI Intolerance       Physical Exam   Constitutional: He is oriented to person, place, and time. He appears well-developed and well-nourished. No distress.   Burping during exam   HENT:   Head: Normocephalic.   Neck: Neck supple.   Cardiovascular: Normal rate, regular rhythm, normal heart sounds and intact distal pulses. Exam reveals no gallop and no friction rub.   No murmur heard.  Pulmonary/Chest: Effort normal and breath sounds normal. No stridor. No respiratory distress. He has no wheezes. He has no rales. He exhibits no tenderness.   Abdominal:   Abdomen is rounded.  Has active bowel sounds in all quadrants.  It is slightly firm in the upper quadrants.  Tender to the upper quadrants.  No specific masses palpated. "   Lymphadenopathy:     He has no cervical adenopathy.   Neurological: He is alert and oriented to person, place, and time.   Skin: Skin is warm and dry. Capillary refill takes less than 2 seconds.   Nursing note and vitals reviewed.      Procedures    LABS  No results found for this or any previous visit.    Assessment/Plan   Jeffery was seen today for rib pain, abdominal pain and bloated.    Diagnoses and all orders for this visit:    Abdominal pain, bilateral upper quadrant  -     US Abdomen Complete  -     omeprazole (priLOSEC) 20 MG capsule; Take 1 capsule by mouth Daily.  -     Ambulatory Referral to Gastroenterology    Other orders  -     Discontinue: ibuprofen (ADVIL,MOTRIN) 800 MG tablet; Take 1 tablet by mouth Every 6 (Six) Hours As Needed for Moderate Pain .      Patient Instructions   We will arrange for a complete abdominal ultrasound.  Begin Prilosec as discussed.  Stop ibuprofen.  GI referral.  Return to the clinic as neededPt verbalizes understanding and agreement with plan of care.     EMR Dragon/transcription disclaimer:  Please note that portions of this note were completed with a voice recognition program.  Electronic transcription of the voice recognition program may permit erroneous words or phrases to be inadvertently transcribed.  Although I have reviewed the note for such errors, some may still exist in this documentation         AMAYA Chen

## 2019-01-03 ENCOUNTER — HOSPITAL ENCOUNTER (OUTPATIENT)
Dept: ULTRASOUND IMAGING | Facility: HOSPITAL | Age: 49
Discharge: HOME | End: 2019-01-03
Admitting: NURSE PRACTITIONER

## 2019-01-03 PROCEDURE — 76700 US EXAM ABDOM COMPLETE: CPT

## 2019-01-03 NOTE — PATIENT INSTRUCTIONS
We will arrange for a complete abdominal ultrasound.  Begin Prilosec as discussed.  Stop ibuprofen.  GI referral.  Return to the clinic as neededPt verbalizes understanding and agreement with plan of care.

## 2019-01-05 ENCOUNTER — TELEPHONE (OUTPATIENT)
Dept: INTERNAL MEDICINE | Facility: CLINIC | Age: 49
End: 2019-01-05

## 2019-01-07 ENCOUNTER — OFFICE VISIT (OUTPATIENT)
Dept: INTERNAL MEDICINE | Facility: CLINIC | Age: 49
End: 2019-01-07

## 2019-01-07 VITALS
DIASTOLIC BLOOD PRESSURE: 60 MMHG | SYSTOLIC BLOOD PRESSURE: 94 MMHG | TEMPERATURE: 98.3 F | HEART RATE: 88 BPM | BODY MASS INDEX: 34.52 KG/M2 | WEIGHT: 269 LBS | HEIGHT: 74 IN

## 2019-01-07 DIAGNOSIS — R10.11 RUQ PAIN: Primary | ICD-10-CM

## 2019-01-07 DIAGNOSIS — R10.10 PAIN OF UPPER ABDOMEN: ICD-10-CM

## 2019-01-07 LAB
ALBUMIN SERPL-MCNC: 4.8 G/DL (ref 3.2–4.8)
ALBUMIN/GLOB SERPL: 2.3 G/DL (ref 1.5–2.5)
ALP SERPL-CCNC: 78 U/L (ref 25–100)
ALT SERPL W P-5'-P-CCNC: 52 U/L (ref 7–40)
ANION GAP SERPL CALCULATED.3IONS-SCNC: 9 MMOL/L (ref 3–11)
ARTICHOKE IGE QN: 187 MG/DL (ref 0–130)
AST SERPL-CCNC: 24 U/L (ref 0–33)
BASOPHILS # BLD AUTO: 0.04 10*3/MM3 (ref 0–0.2)
BASOPHILS NFR BLD AUTO: 0.5 % (ref 0–1)
BILIRUB SERPL-MCNC: 0.5 MG/DL (ref 0.3–1.2)
BUN BLD-MCNC: 24 MG/DL (ref 9–23)
BUN/CREAT SERPL: 23.3 (ref 7–25)
CALCIUM SPEC-SCNC: 9.5 MG/DL (ref 8.7–10.4)
CHLORIDE SERPL-SCNC: 107 MMOL/L (ref 99–109)
CHOLEST SERPL-MCNC: 249 MG/DL (ref 0–200)
CO2 SERPL-SCNC: 25 MMOL/L (ref 20–31)
CREAT BLD-MCNC: 1.03 MG/DL (ref 0.6–1.3)
DEPRECATED RDW RBC AUTO: 44.5 FL (ref 37–54)
EOSINOPHIL # BLD AUTO: 0.19 10*3/MM3 (ref 0–0.3)
EOSINOPHIL NFR BLD AUTO: 2.6 % (ref 0–3)
ERYTHROCYTE [DISTWIDTH] IN BLOOD BY AUTOMATED COUNT: 13.5 % (ref 11.3–14.5)
GFR SERPL CREATININE-BSD FRML MDRD: 77 ML/MIN/1.73
GLOBULIN UR ELPH-MCNC: 2.1 GM/DL
GLUCOSE BLD-MCNC: 114 MG/DL (ref 70–100)
HCT VFR BLD AUTO: 48.6 % (ref 38.9–50.9)
HDLC SERPL-MCNC: 54 MG/DL (ref 40–60)
HGB BLD-MCNC: 16.9 G/DL (ref 13.1–17.5)
IMM GRANULOCYTES # BLD AUTO: 0.04 10*3/MM3 (ref 0–0.03)
IMM GRANULOCYTES NFR BLD AUTO: 0.5 % (ref 0–0.6)
LIPASE SERPL-CCNC: 40 U/L (ref 6–51)
LYMPHOCYTES # BLD AUTO: 1.85 10*3/MM3 (ref 0.6–4.8)
LYMPHOCYTES NFR BLD AUTO: 24.8 % (ref 24–44)
MCH RBC QN AUTO: 31.9 PG (ref 27–31)
MCHC RBC AUTO-ENTMCNC: 34.8 G/DL (ref 32–36)
MCV RBC AUTO: 91.7 FL (ref 80–99)
MONOCYTES # BLD AUTO: 0.66 10*3/MM3 (ref 0–1)
MONOCYTES NFR BLD AUTO: 8.9 % (ref 0–12)
NEUTROPHILS # BLD AUTO: 4.71 10*3/MM3 (ref 1.5–8.3)
NEUTROPHILS NFR BLD AUTO: 63.2 % (ref 41–71)
NRBC BLD AUTO-RTO: 0 /100 WBC (ref 0–0)
PLATELET # BLD AUTO: 277 10*3/MM3 (ref 150–450)
PMV BLD AUTO: 10.4 FL (ref 6–12)
POTASSIUM BLD-SCNC: 4.5 MMOL/L (ref 3.5–5.5)
PROT SERPL-MCNC: 6.9 G/DL (ref 5.7–8.2)
RBC # BLD AUTO: 5.3 10*6/MM3 (ref 4.2–5.76)
SODIUM BLD-SCNC: 141 MMOL/L (ref 132–146)
TRIGL SERPL-MCNC: 187 MG/DL (ref 0–150)
WBC NRBC COR # BLD: 7.45 10*3/MM3 (ref 3.5–10.8)

## 2019-01-07 PROCEDURE — 85025 COMPLETE CBC W/AUTO DIFF WBC: CPT | Performed by: NURSE PRACTITIONER

## 2019-01-07 PROCEDURE — 99214 OFFICE O/P EST MOD 30 MIN: CPT | Performed by: NURSE PRACTITIONER

## 2019-01-07 PROCEDURE — 83690 ASSAY OF LIPASE: CPT | Performed by: NURSE PRACTITIONER

## 2019-01-07 PROCEDURE — 80053 COMPREHEN METABOLIC PANEL: CPT | Performed by: NURSE PRACTITIONER

## 2019-01-07 PROCEDURE — 80061 LIPID PANEL: CPT | Performed by: NURSE PRACTITIONER

## 2019-01-07 NOTE — PROGRESS NOTES
"Subjective   Jeffery Olivas is a 48 y.o. male.   Chief Complaint   Patient presents with   • Follow-up     abdominal issues , has not heard anything about his GI referral. Prilosec not helping, gas-x not helping at all. Would like his gallbladder checked.      History of Present Illness Still nauseated and feels flushed and hot.  Still burping and beclching.  Sitting at a 45° angle helps.  No fever or chills.  No vomiting.  Continues to have lower left rib pain but it is improved.  Bowels are moving good.  Eating and drinking okay.    The following portions of the patient's history were reviewed and updated as appropriate: allergies, current medications, past family history, past medical history, past social history, past surgical history and problem list.    Current Outpatient Medications:   •  omeprazole (priLOSEC) 20 MG capsule, Take 1 capsule by mouth Daily., Disp: 30 capsule, Rfl: 1    Review of Systems Consitutional, HEENT, Respiratory, CV, GI, , Skin, Musculoskeletal, Neuro-mental, Endocrinological, Hematological were reviewed.  Positives were discussed in the HPI, otherwise ROS was negative   BP 94/60   Pulse 88   Temp 98.3 °F (36.8 °C)   Ht 188 cm (74\")   Wt 122 kg (269 lb)   BMI 34.54 kg/m²     Objective   Allergies   Allergen Reactions   • Codeine GI Intolerance       Physical Exam   Constitutional: He is oriented to person, place, and time. He appears well-developed and well-nourished. No distress.   HENT:   Head: Normocephalic.   Eyes: Right eye exhibits no discharge. Left eye exhibits no discharge. No scleral icterus.   Cardiovascular: Normal rate, regular rhythm, normal heart sounds and intact distal pulses. Exam reveals no gallop and no friction rub.   No murmur heard.  Pulmonary/Chest: Effort normal and breath sounds normal. No stridor. No respiratory distress. He has no wheezes. He has no rales.   Abdominal: Soft. Bowel sounds are normal.   Diffusely tender in the upper quadrants.  There is " no mass or HSM   Neurological: He is alert and oriented to person, place, and time.   Skin: Skin is warm and dry. Capillary refill takes less than 2 seconds.   Nursing note and vitals reviewed.      Procedures    LABS  Results for orders placed or performed in visit on 01/07/19   Comprehensive Metabolic Panel   Result Value Ref Range    Glucose 114 (H) 70 - 100 mg/dL    BUN 24 (H) 9 - 23 mg/dL    Creatinine 1.03 0.60 - 1.30 mg/dL    Sodium 141 132 - 146 mmol/L    Potassium 4.5 3.5 - 5.5 mmol/L    Chloride 107 99 - 109 mmol/L    CO2 25.0 20.0 - 31.0 mmol/L    Calcium 9.5 8.7 - 10.4 mg/dL    Total Protein 6.9 5.7 - 8.2 g/dL    Albumin 4.80 3.20 - 4.80 g/dL    ALT (SGPT) 52 (H) 7 - 40 U/L    AST (SGOT) 24 0 - 33 U/L    Alkaline Phosphatase 78 25 - 100 U/L    Total Bilirubin 0.5 0.3 - 1.2 mg/dL    eGFR Non African Amer 77 >60 mL/min/1.73    Globulin 2.1 gm/dL    A/G Ratio 2.3 1.5 - 2.5 g/dL    BUN/Creatinine Ratio 23.3 7.0 - 25.0    Anion Gap 9.0 3.0 - 11.0 mmol/L   Lipase   Result Value Ref Range    Lipase 40 6 - 51 U/L   Lipid Panel   Result Value Ref Range    Total Cholesterol 249 (H) 0 - 200 mg/dL    Triglycerides 187 (H) 0 - 150 mg/dL    HDL Cholesterol 54 40 - 60 mg/dL    LDL Cholesterol  187 (H) 0 - 130 mg/dL   CBC Auto Differential   Result Value Ref Range    WBC 7.45 3.50 - 10.80 10*3/mm3    RBC 5.30 4.20 - 5.76 10*6/mm3    Hemoglobin 16.9 13.1 - 17.5 g/dL    Hematocrit 48.6 38.9 - 50.9 %    MCV 91.7 80.0 - 99.0 fL    MCH 31.9 (H) 27.0 - 31.0 pg    MCHC 34.8 32.0 - 36.0 g/dL    RDW 13.5 11.3 - 14.5 %    RDW-SD 44.5 37.0 - 54.0 fl    MPV 10.4 6.0 - 12.0 fL    Platelets 277 150 - 450 10*3/mm3    Neutrophil % 63.2 41.0 - 71.0 %    Lymphocyte % 24.8 24.0 - 44.0 %    Monocyte % 8.9 0.0 - 12.0 %    Eosinophil % 2.6 0.0 - 3.0 %    Basophil % 0.5 0.0 - 1.0 %    Immature Grans % 0.5 0.0 - 0.6 %    Neutrophils, Absolute 4.71 1.50 - 8.30 10*3/mm3    Lymphocytes, Absolute 1.85 0.60 - 4.80 10*3/mm3    Monocytes, Absolute  0.66 0.00 - 1.00 10*3/mm3    Eosinophils, Absolute 0.19 0.00 - 0.30 10*3/mm3    Basophils, Absolute 0.04 0.00 - 0.20 10*3/mm3    Immature Grans, Absolute 0.04 (H) 0.00 - 0.03 10*3/mm3    nRBC 0.0 0.0 - 0.0 /100 WBC       Assessment/Plan   Jeffery was seen today for follow-up.    Diagnoses and all orders for this visit:    RUQ pain  -     NM HIDA scan with pharmacological intervention; Future  -     CBC & Differential  -     Comprehensive Metabolic Panel  -     Lipase  -     Lipid Panel  -     CBC Auto Differential    Pain of upper abdomen  -     NM HIDA scan with pharmacological intervention; Future  -     CBC & Differential  -     Comprehensive Metabolic Panel  -     Lipase  -     Lipid Panel  -     CBC Auto Differential      Patient Instructions   I have reviewed ultrasound and CT reports with patient.  We will obtain lab work today.  We will schedule for HIDA scan.  GI follow-up is in progress.  Off from work 1 week.   try dairy free diet. Pt verbalizes understanding and agreement with plan of care.     EMR Dragon/transcription disclaimer:  Please note that portions of this note were completed with a voice recognition program.  Electronic transcription of the voice recognition program may permit erroneous words or phrases to be inadvertently transcribed.  Although I have reviewed the note for such errors, some may still exist in this documentation     AMAYA Chen

## 2019-01-09 ENCOUNTER — TELEPHONE (OUTPATIENT)
Dept: INTERNAL MEDICINE | Facility: CLINIC | Age: 49
End: 2019-01-09

## 2019-01-09 NOTE — PATIENT INSTRUCTIONS
I have reviewed ultrasound and CT reports with patient.  We will obtain lab work today.  We will schedule for HIDA scan.  GI follow-up is in progress.  Off from work 1 week.   try dairy free diet. Pt verbalizes understanding and agreement with plan of care.

## 2019-01-09 NOTE — TELEPHONE ENCOUNTER
DARNELL WITH [employer] CALLED TODAY REGARDING MR TUCKER'S RECENT APPOINTMENT WITH [employer]. HE HAS RECEIVED THE AVS AND RETURN TO WORK LETTER FROM THIS OFFICE BUT NEEDS THE FINDINGS AS TO WHY THE PATIENT NEEDS TO BE OUT OF WORK.    PLEASE RETURN CALL TO DARNELL [phone] WITH MR TUCKER'S EMPLOYEE ID # [id number]

## 2019-01-10 ENCOUNTER — TELEPHONE (OUTPATIENT)
Dept: INTERNAL MEDICINE | Facility: CLINIC | Age: 49
End: 2019-01-10

## 2019-01-10 ENCOUNTER — OFFICE VISIT (OUTPATIENT)
Dept: GASTROENTEROLOGY | Facility: CLINIC | Age: 49
End: 2019-01-10

## 2019-01-10 VITALS
DIASTOLIC BLOOD PRESSURE: 90 MMHG | WEIGHT: 277.4 LBS | TEMPERATURE: 97.9 F | OXYGEN SATURATION: 96 % | HEIGHT: 74 IN | BODY MASS INDEX: 35.6 KG/M2 | HEART RATE: 101 BPM | SYSTOLIC BLOOD PRESSURE: 124 MMHG

## 2019-01-10 DIAGNOSIS — R14.0 BLOATING: Primary | ICD-10-CM

## 2019-01-10 DIAGNOSIS — R10.13 EPIGASTRIC PAIN: ICD-10-CM

## 2019-01-10 DIAGNOSIS — R10.11 RIGHT UPPER QUADRANT ABDOMINAL PAIN: ICD-10-CM

## 2019-01-10 PROCEDURE — 99244 OFF/OP CNSLTJ NEW/EST MOD 40: CPT | Performed by: INTERNAL MEDICINE

## 2019-01-10 NOTE — PROGRESS NOTES
PCP: Silvia Philip APRN    Chief Complaint   Patient presents with   • Abdominal Pain       History of Present Illness:   HPI  I appreciate the consult on Mr. Olivas for abdominal pain.  Mr. Olivas was working outside in the yard and fell about one week before [place].  He fell on his right side and was not able to brace the fall.  The patient went to have x-rays done to check for injury to the ribs and this was negative.  He states that about 2 days later there was development of upper abdominal discomfort.  The patient took a prescription for Prilosec but this did not give him any relief.  He also tried Gas-X without relief.  He does admit to a decreased appetite and bloating of the abdomen.  Mr. Olivas states that it is uncomfortable for him to sit or bend forward.  He has most relief when he is lying back at a 45° angle.  The patient has not been able to work because his job is very labor intensive at [employer].  There is no history of difficult or painful swallowing.  He denies any larissa heartburn.  Mr. Olivas states he has never experienced any issues with his digestive tract.  The patient in fact states he has been able to eat most foods over the years without difficulty.  He does not consume any carbonated beverages in general.  The patient does not drink alcohol on a  regular basis.  There is no history of tobacco use.  He denies any family history of gastrointestinal cancer.  Mr. Olivas has regular bowel habits without signs of gastrointestinal bleeding. He denies regular use of nonsteroidal medication.  History reviewed. No pertinent past medical history.    Past Surgical History:   Procedure Laterality Date   • ACHILLES TENDON SURGERY     • APPENDECTOMY     • KNEE MENISCAL REPAIR Right        No current outpatient medications on file.    Allergies   Allergen Reactions   • Codeine GI Intolerance       Family History   Problem Relation Age of Onset   • Hiatal hernia Maternal Grandfather        Social  History     Socioeconomic History   • Marital status:      Spouse name: Not on file   • Number of children: Not on file   • Years of education: Not on file   • Highest education level: Not on file   Social Needs   • Financial resource strain: Not on file   • Food insecurity - worry: Not on file   • Food insecurity - inability: Not on file   • Transportation needs - medical: Not on file   • Transportation needs - non-medical: Not on file   Occupational History   • Not on file   Tobacco Use   • Smoking status: Never Smoker   • Smokeless tobacco: Never Used   Substance and Sexual Activity   • Alcohol use: Yes     Comment: occ   • Drug use: Defer   • Sexual activity: Defer   Other Topics Concern   • Not on file   Social History Narrative   • Not on file       Review of Systems   Constitutional: Negative for activity change, appetite change, fatigue, fever and unexpected weight change.   HENT: Negative for dental problem, hearing loss, mouth sores, postnasal drip, sneezing, trouble swallowing and voice change.    Eyes: Negative for pain, redness, itching and visual disturbance.   Respiratory: Negative for cough, choking, chest tightness, shortness of breath and wheezing.    Cardiovascular: Negative for chest pain, palpitations and leg swelling.   Gastrointestinal: Positive for abdominal distention (bloating) and abdominal pain. Negative for anal bleeding, blood in stool, constipation, diarrhea, nausea, rectal pain and vomiting.        Burning in stomach & belching   Endocrine: Negative for cold intolerance, heat intolerance, polydipsia, polyphagia and polyuria.   Genitourinary: Negative.  Negative for dysuria, enuresis, flank pain, hematuria and urgency.   Musculoskeletal: Negative for arthralgias, back pain, gait problem, joint swelling and myalgias.   Skin: Negative for color change, pallor and rash.   Allergic/Immunologic: Negative for environmental allergies, food allergies and immunocompromised state.    Neurological: Negative for dizziness, tremors, seizures, facial asymmetry, speech difficulty, numbness and headaches.   Hematological: Negative for adenopathy.   Psychiatric/Behavioral: Negative for behavioral problems, confusion, dysphoric mood, hallucinations and self-injury.       Vitals:    01/10/19 1522   BP: 124/90   Pulse: 101   Temp: 97.9 °F (36.6 °C)   SpO2: 96%       Physical Exam   Constitutional: He is oriented to person, place, and time. He appears well-nourished. No distress.   HENT:   Head: Atraumatic.   Mouth/Throat: Oropharynx is clear and moist. No oropharyngeal exudate.   Eyes: EOM are normal. No scleral icterus.   Neck: Neck supple. No thyromegaly present.   Cardiovascular: Normal rate, regular rhythm and normal heart sounds. Exam reveals no gallop.   No murmur heard.  Pulmonary/Chest: Effort normal and breath sounds normal. He has no wheezes. He has no rales.   Discomfort with palpation along the left costal margin and xiphoid process   Abdominal: Soft. Bowel sounds are normal. Distention: epigastrium. There is tenderness. There is no rebound and no guarding.   Musculoskeletal: Normal range of motion. He exhibits tenderness (left costal region adjacent). He exhibits no edema.   Lymphadenopathy:     He has no cervical adenopathy.   Neurological: He is alert and oriented to person, place, and time. He exhibits normal muscle tone.   Skin: Skin is dry. No erythema.   Psychiatric: He has a normal mood and affect. His behavior is normal. Thought content normal.   Vitals reviewed.      Jeffery was seen today for abdominal pain.    Diagnoses and all orders for this visit:    Bloating    Epigastric pain    Right upper quadrant abdominal pain    The patient has some upper abdominal pain.  There was some sludge on the ultrasound and at this point cannot exclude gallbladder disease.  The history is still consistent with injury to the rib cage and concomitant intercostal muscle injury.    Elevated ALT- likely  due to nonalcohol fatty liver disease.      Plan: The patient has a HIDA scan scheduled for Monday.  If abnormal would recommend general surgical consultation.           If HIDA scan is normal will proceed with EGD for further evaluation.    I spent over 50% of the office visit counseling and answering questions from the patient.

## 2019-01-14 ENCOUNTER — HOSPITAL ENCOUNTER (OUTPATIENT)
Dept: NUCLEAR MEDICINE | Facility: HOSPITAL | Age: 49
Discharge: HOME | End: 2019-01-14

## 2019-01-14 DIAGNOSIS — R10.11 RUQ PAIN: ICD-10-CM

## 2019-01-14 DIAGNOSIS — R10.10 PAIN OF UPPER ABDOMEN: ICD-10-CM

## 2019-01-14 PROCEDURE — 78227 HEPATOBIL SYST IMAGE W/DRUG: CPT

## 2019-01-14 PROCEDURE — A9537 TC99M MEBROFENIN: HCPCS | Performed by: NURSE PRACTITIONER

## 2019-01-14 PROCEDURE — 25010000002 SINCALIDE PER 5 MCG: Performed by: NURSE PRACTITIONER

## 2019-01-14 PROCEDURE — 0 TECHNETIUM TC 99M MEBROFENIN KIT: Performed by: NURSE PRACTITIONER

## 2019-01-14 RX ORDER — KIT FOR THE PREPARATION OF TECHNETIUM TC 99M MEBROFENIN 45 MG/10ML
1 INJECTION, POWDER, LYOPHILIZED, FOR SOLUTION INTRAVENOUS
Status: COMPLETED | OUTPATIENT
Start: 2019-01-14 | End: 2019-01-14

## 2019-01-14 RX ADMIN — MEBROFENIN 1 DOSE: 45 INJECTION, POWDER, LYOPHILIZED, FOR SOLUTION INTRAVENOUS at 13:35

## 2019-01-14 RX ADMIN — SINCALIDE 2.5 MCG: 5 INJECTION, POWDER, LYOPHILIZED, FOR SOLUTION INTRAVENOUS at 14:30

## 2019-01-17 ENCOUNTER — PATIENT MESSAGE (OUTPATIENT)
Dept: GASTROENTEROLOGY | Facility: CLINIC | Age: 49
End: 2019-01-17

## 2019-01-17 NOTE — TELEPHONE ENCOUNTER
From: Jeffery Olivas  To: Tigre Luo MD  Sent: 1/17/2019 12:23 PM EST  Subject: Non-Urgent Medical Question    Just a quick question...  Are there any special instructions for my procedure tomorrow?  Thanks, Jeffery

## 2019-01-18 ENCOUNTER — OUTSIDE FACILITY SERVICE (OUTPATIENT)
Dept: GASTROENTEROLOGY | Facility: CLINIC | Age: 49
End: 2019-01-18

## 2019-01-18 ENCOUNTER — LAB REQUISITION (OUTPATIENT)
Dept: LAB | Facility: HOSPITAL | Age: 49
End: 2019-01-18

## 2019-01-18 DIAGNOSIS — R10.13 EPIGASTRIC PAIN: ICD-10-CM

## 2019-01-18 PROCEDURE — 43239 EGD BIOPSY SINGLE/MULTIPLE: CPT | Performed by: INTERNAL MEDICINE

## 2019-01-18 PROCEDURE — 88305 TISSUE EXAM BY PATHOLOGIST: CPT | Performed by: INTERNAL MEDICINE

## 2019-01-21 LAB
CYTO UR: NORMAL
LAB AP CASE REPORT: NORMAL
LAB AP CLINICAL INFORMATION: NORMAL
PATH REPORT.FINAL DX SPEC: NORMAL
PATH REPORT.GROSS SPEC: NORMAL

## 2019-01-22 ENCOUNTER — PATIENT MESSAGE (OUTPATIENT)
Dept: GASTROENTEROLOGY | Facility: CLINIC | Age: 49
End: 2019-01-22

## 2019-01-22 ENCOUNTER — TELEPHONE (OUTPATIENT)
Dept: GASTROENTEROLOGY | Facility: CLINIC | Age: 49
End: 2019-01-22

## 2019-01-22 DIAGNOSIS — K29.00 ACUTE GASTRITIS WITHOUT HEMORRHAGE, UNSPECIFIED GASTRITIS TYPE: ICD-10-CM

## 2019-01-22 DIAGNOSIS — K26.9 DUODENAL ULCER: Primary | ICD-10-CM

## 2019-01-22 RX ORDER — ESOMEPRAZOLE MAGNESIUM 40 MG/1
40 CAPSULE, DELAYED RELEASE ORAL DAILY
Qty: 30 CAPSULE | Refills: 5 | Status: SHIPPED | OUTPATIENT
Start: 2019-01-22 | End: 2022-02-07

## 2019-01-22 NOTE — TELEPHONE ENCOUNTER
Dr Luo,  I called patient back. He just waiting on response from his EGD results and work excuse for his employer. Patient stated he isn't feeling any better. Thanks

## 2019-01-23 ENCOUNTER — PATIENT MESSAGE (OUTPATIENT)
Dept: GASTROENTEROLOGY | Facility: CLINIC | Age: 49
End: 2019-01-23

## 2019-01-23 NOTE — TELEPHONE ENCOUNTER
From: Jeffery Olivas  To: Tigre Luo MD  Sent: 2019 9:25 AM EST  Subject: Visit Follow-Up Question    Good Morning,  Thank you so much for the update and Work Excuse; it helps relieve some of my concern. I will  the Nexium today. What's next? Do I need a Follow-Up with Dr. Luo? I have some questions and am not sure how best to get them answered. Thanks again for all your help!  Jeffery Wellington  ----- Message -----  From: DEON NORRIS  Sent: 2019 7:40 AM EST  To: Jeffery Olivas  Subject: RE: Visit Follow-Up Question  Good morning Mr. Olivas,  Dr Luo sent in Nexium to your pharmacy. Your biopsy were negative for H pylori. I will also have [place] to update your work excuse. Thanks, KAY Heaton.    ----- Message -----   From: Jeffery LEONARDO Deisy   Sent: 2019 8:22 PM EST   To: Tigre Luo MD  Subject: Visit Follow-Up Question    Good Morning Dr. Luo,  I haven’t heard from anyone regarding my Pathology Report, Follow-Up, or Treatment. Being in such pain and discomfort has me very worried; and the lack of results, information, or improvement through this process has been maddening. In addition, I need to be fair and informative with my employer. My Work Excuse  yesterday. I’m required to have a Full Work Release; yet am UNABLE to work and have seen little or no improvement since falling last month. I really MUST update my employer today, Wednesday. I’m spoken with them and, treatment permitting, ask to have my Work Excuse updated ASAP with a return to work date of Monday, . Please update me and thank you for your time.  MelitaJeffery cruz Deisy  [phone]

## 2019-01-24 NOTE — TELEPHONE ENCOUNTER
From: Jeffery Olivas  To: Tigre Luo MD  Sent: 1/23/2019 10:15 AM EST  Subject: Visit Follow-Up Question    Thanks Sudarshan for all your help. I'm sure I'm asking a lot of silly questions. I've never been through anything like this and really appreciate your patience. Other than the Nexium, is there anything I should or shouldn't change? Most importantly, what about the Diaphragmatic Hernia? Everything I've read states this requires surgery to repair? Again, thanks so much for everything you and Dr. Luo have done for me.  ----- Message -----  From: DEON NORRIS  Sent: 1/23/2019 10:04 AM EST  To: Jeffery A Deisy  Subject: RE: Visit Follow-Up Question  Called our office and schedule a follow up in 4 weeks or if symptoms persist. . Choose option 4. Thanks KAY Heaton    ----- Message -----   From: Jeffery Olivas   Sent: 1/23/2019 9:25 AM EST   To: Tigre Luo MD  Subject: Visit Follow-Up Question    Good Morning,  Thank you so much for the update and Work Excuse; it helps relieve some of my concern. I will  the Nexium today. What's next? Do I need a Follow-Up with Dr. Luo? I have some questions and am not sure how best to get them answered. Thanks again for all your help!  Jeffery Wellington  ----- Message -----  From: DEON NORRIS  Sent: 1/23/2019 7:40 AM EST  To: Jeffery Olivas  Subject: RE: Visit Follow-Up Question  Good morning Mr. Olivas,  Dr Luo sent in Nexium to your pharmacy. Your biopsy were negative for H pylori. I will also have [place] to update your work excuse. ThanksSudarshan RMA.    ----- Message -----   From: Jeffery Olivas   Sent: 1/22/2019 8:22 PM EST   To: Tigre Luo MD  Subject: Visit Follow-Up Question    Good Morning Dr. Valerio,  I haven’t heard from anyone regarding my Pathology Report, Follow-Up, or Treatment. Being in such pain and discomfort has me very worried; and the lack of results, information, or improvement through this  process has been maddening. In addition, I need to be fair and informative with my employer. My Work Excuse  yesterday. I’m required to have a Full Work Release; yet am UNABLE to work and have seen little or no improvement since falling last month. I really MUST update my employer today, Wednesday. I’m spoken with them and, treatment permitting, ask to have my Work Excuse updated ASAP with a return to work date of Monday, . Please update me and thank you for your time.  Jeffery Wellington  [phone]

## 2019-01-30 ENCOUNTER — PATIENT MESSAGE (OUTPATIENT)
Dept: GASTROENTEROLOGY | Facility: CLINIC | Age: 49
End: 2019-01-30

## 2019-02-01 ENCOUNTER — TELEPHONE (OUTPATIENT)
Dept: GASTROENTEROLOGY | Facility: CLINIC | Age: 49
End: 2019-02-01

## 2019-02-01 ENCOUNTER — PATIENT MESSAGE (OUTPATIENT)
Dept: GASTROENTEROLOGY | Facility: CLINIC | Age: 49
End: 2019-02-01

## 2019-02-01 NOTE — TELEPHONE ENCOUNTER
Called patient back. Dr Luo got patient going back to work on 2/4/2019. No answer; left voice message.

## 2019-02-01 NOTE — TELEPHONE ENCOUNTER
From: Jeffery Olivas  To: Tigre Luo MD  Sent: 2/1/2019 12:23 AM EST  Subject: Visit Follow-Up Question    Good Morning,  I'm just following up from yesterday; I sent a message and haven't heard back. Please get back to me at your soonest convenience. I hope to resolve this today before we get into the weekend. Thanks again for all you've done to help me!  Jeffery Wellington  [phone]

## 2019-02-01 NOTE — TELEPHONE ENCOUNTER
From: Jeffery Olivas  To: Tigre Luo MD  Sent: 1/30/2019 9:37 PM EST  Subject: Visit Follow-Up Question    Good Morning Dr. Luo,   I just wanted to take a minute and thank you for helping me. After a month of not knowing, I’m finally improving! What a relief! Currently, I am to return to work this coming Monday, 2/4. My employer requires a 100% Full Work Release when I return; not to mention, I want to be able to properly and completely do my job. I’m better but am not to the point of making the hour commute or getting my job done. I’ve spoken with my employer and ask for an updated Work Excuse to return to COMPLETE FULL DUTY on 2/13. I WILL BE READY by then and am anxious to get back at it! Please call with any questions or concerns. Again, thank you so much for your time, patience, and care.  Jeffery Wellington  [phone]

## 2021-02-26 NOTE — TELEPHONE ENCOUNTER
Discussed with Jessica alaniz for note  
Let pt know of message and result, pt states he is still having pain and can't work and even having to drive is painful. Pt is missing work today and he made appt for Tomorrow 1/7/19 @ 8:15 to discuss things further. Pt is wanting an excuse for work to cover today at least, and then he will discuss further leave excuse at appt. Please advise.  
PT IS ASKING ABOUT THE ULTRASOUND RESULTS AND HIS WORK EXCUSE RUNS OUT TODAY.  PLEASE CALL AS SOON AS YOUR AVAILABLE.  
Please advise.  
Please call patient your ultrasound of your abdomen shows probable fat infiltration of your liver.  There is no liver lesions and no fluid.  You are free to go back to work.  I hope you are feeling  better  
Strong peripheral pulses

## 2021-05-28 ENCOUNTER — OFFICE VISIT (OUTPATIENT)
Dept: INTERNAL MEDICINE | Facility: CLINIC | Age: 51
End: 2021-05-28

## 2021-05-28 VITALS
DIASTOLIC BLOOD PRESSURE: 90 MMHG | WEIGHT: 298 LBS | HEIGHT: 74 IN | SYSTOLIC BLOOD PRESSURE: 142 MMHG | BODY MASS INDEX: 38.24 KG/M2 | HEART RATE: 107 BPM | OXYGEN SATURATION: 97 %

## 2021-05-28 DIAGNOSIS — R31.9 HEMATURIA, UNSPECIFIED TYPE: ICD-10-CM

## 2021-05-28 DIAGNOSIS — R36.1 BLOOD IN SEMEN: ICD-10-CM

## 2021-05-28 DIAGNOSIS — R10.32 GROIN PAIN, LEFT: Primary | ICD-10-CM

## 2021-05-28 PROCEDURE — 99213 OFFICE O/P EST LOW 20 MIN: CPT | Performed by: NURSE PRACTITIONER

## 2021-06-03 ENCOUNTER — PATIENT MESSAGE (OUTPATIENT)
Dept: INTERNAL MEDICINE | Facility: CLINIC | Age: 51
End: 2021-06-03

## 2021-06-03 DIAGNOSIS — R10.32 GROIN PAIN, LEFT: Primary | ICD-10-CM

## 2021-06-07 ENCOUNTER — HOSPITAL ENCOUNTER (OUTPATIENT)
Dept: ULTRASOUND IMAGING | Facility: HOSPITAL | Age: 51
Discharge: HOME | End: 2021-06-07
Admitting: NURSE PRACTITIONER

## 2021-06-07 DIAGNOSIS — R10.32 GROIN PAIN, LEFT: ICD-10-CM

## 2021-06-07 PROCEDURE — 76870 US EXAM SCROTUM: CPT

## 2021-06-09 ENCOUNTER — PATIENT MESSAGE (OUTPATIENT)
Dept: INTERNAL MEDICINE | Facility: CLINIC | Age: 51
End: 2021-06-09

## 2021-06-09 DIAGNOSIS — R36.1 BLOOD IN SEMEN: ICD-10-CM

## 2021-06-09 DIAGNOSIS — R31.9 HEMATURIA, UNSPECIFIED TYPE: Primary | ICD-10-CM

## 2021-06-09 DIAGNOSIS — R10.32 GROIN PAIN, LEFT: ICD-10-CM

## 2021-06-09 NOTE — TELEPHONE ENCOUNTER
From: Jeffery Olivas  To: AMAYA Somers  Sent: 6/3/2021 12:51 PM EDT  Subject: Visit Follow-Up Question    Mina Crouch,   Just checking in... I haven't heard back from you and still haven't heard from [facility] to schedule an Ultrasound. I hope all is well.  Jeffery Wellington

## 2021-06-11 DIAGNOSIS — R36.1 BLOOD IN SEMEN: ICD-10-CM

## 2021-06-11 DIAGNOSIS — R10.32 GROIN PAIN, LEFT: ICD-10-CM

## 2021-06-11 DIAGNOSIS — R31.9 HEMATURIA, UNSPECIFIED TYPE: Primary | ICD-10-CM

## 2021-06-30 ENCOUNTER — HOSPITAL ENCOUNTER (OUTPATIENT)
Dept: CT IMAGING | Facility: HOSPITAL | Age: 51
Discharge: HOME | End: 2021-06-30
Admitting: NURSE PRACTITIONER

## 2021-06-30 DIAGNOSIS — R31.9 HEMATURIA, UNSPECIFIED TYPE: ICD-10-CM

## 2021-06-30 DIAGNOSIS — R36.1 BLOOD IN SEMEN: ICD-10-CM

## 2021-06-30 DIAGNOSIS — R10.32 GROIN PAIN, LEFT: ICD-10-CM

## 2021-06-30 PROCEDURE — 25010000002 IOPAMIDOL 61 % SOLUTION: Performed by: NURSE PRACTITIONER

## 2021-06-30 PROCEDURE — 74178 CT ABD&PLV WO CNTR FLWD CNTR: CPT

## 2021-06-30 RX ADMIN — IOPAMIDOL 95 ML: 612 INJECTION, SOLUTION INTRAVENOUS at 09:16

## 2021-08-08 ENCOUNTER — PATIENT MESSAGE (OUTPATIENT)
Dept: INTERNAL MEDICINE | Facility: CLINIC | Age: 51
End: 2021-08-08

## 2021-08-08 DIAGNOSIS — R10.32 GROIN PAIN, LEFT: Primary | ICD-10-CM

## 2021-08-09 NOTE — TELEPHONE ENCOUNTER
From: Jeffery Olivas  To: Willa Bermudezkate CasianoAMAYA  Sent: 8/8/2021 10:57 PM EDT  Subject: Referral Request    Good Morning Willa,  I'm just following up on my email from a week ago. I haven't heard back. I hope you had a great weekend.  Thanks, Jeffery  ----------------------------  07/29/2021 04:30 PM  Referral Request  Mina Crouch!  I hope all is well with you! I just met with Dr. Nunez (Urologist) for the second time and he asked I reach out to you to request a Surgical Consult for me with my CT Scan. I would like to keep as much of my treatment with [email or web address] as my wife is a [email or web address] employee, our Health Insurance is through her, and consequently treatment costs are reduced when we use [email or web address]. Thanks for everything!  Jeffery Wellington

## 2021-08-11 ENCOUNTER — TELEPHONE (OUTPATIENT)
Dept: INTERNAL MEDICINE | Facility: CLINIC | Age: 51
End: 2021-08-11

## 2021-08-11 NOTE — TELEPHONE ENCOUNTER
----- Message from Frances Hill MA sent at 8/11/2021 10:26 AM EDT -----  Regarding: FW:REFERRAL TO GENERAL SURGERY  Contact: [phone]    ----- Message -----  From: Jeffery Olivas  Sent: 8/11/2021   9:46 AM EDT  To: [facility]  Subject: RE:REFERRAL TO GENERAL SURGERY                   Good Morning,  Thanks so much for getting this appointment set up so quickly!  I have attached my Drivers License and Insurance Card.  Please let me know if there are any issues with my Insurance being accepted.  Also, my wife (Insured) works for [employer] and we get a discount for keeping within their services.  It at all possible we'd like to do so; please advise.  If you are able to upload the necessary forms, I will happily fill them out in advance and send back to you.  Please call me with any questions or concerns.  [phone]  Thanks for your time!  Jeffery Wellington

## 2021-08-11 NOTE — TELEPHONE ENCOUNTER
Kareen,    I do not k now what forms he is referring to.  I've sent all info to [facility].   does not have a General Surgeon.  We use [facility] as an affiliate w/  so it is all in network.  Each pt is responsible for making sure the provider they are seeing is in network.    NC[facility]

## 2021-08-11 NOTE — TELEPHONE ENCOUNTER
Spoke to Pt and he stated that he ment to send message to [facility] and not to our office. Informed Pt that he will need to call [facility] to ask about what paperwork he will need to fill out prior to his visit. Pt voiced understanding.

## 2021-10-08 ENCOUNTER — PRE-ADMISSION TESTING (OUTPATIENT)
Dept: PREADMISSION TESTING | Facility: HOSPITAL | Age: 51
End: 2021-10-08

## 2021-10-08 VITALS — HEIGHT: 74 IN | WEIGHT: 306.22 LBS | BODY MASS INDEX: 39.3 KG/M2

## 2021-10-08 LAB
ANION GAP SERPL CALCULATED.3IONS-SCNC: 13 MMOL/L (ref 5–15)
BUN SERPL-MCNC: 15 MG/DL (ref 6–20)
BUN/CREAT SERPL: 16 (ref 7–25)
CALCIUM SPEC-SCNC: 10 MG/DL (ref 8.6–10.5)
CHLORIDE SERPL-SCNC: 104 MMOL/L (ref 98–107)
CO2 SERPL-SCNC: 24 MMOL/L (ref 22–29)
CREAT SERPL-MCNC: 0.94 MG/DL (ref 0.76–1.27)
DEPRECATED RDW RBC AUTO: 41.8 FL (ref 37–54)
ERYTHROCYTE [DISTWIDTH] IN BLOOD BY AUTOMATED COUNT: 12.7 % (ref 12.3–15.4)
GFR SERPL CREATININE-BSD FRML MDRD: 84 ML/MIN/1.73
GLUCOSE SERPL-MCNC: 130 MG/DL (ref 65–99)
HBA1C MFR BLD: 6.7 % (ref 4.8–5.6)
HCT VFR BLD AUTO: 46.1 % (ref 37.5–51)
HGB BLD-MCNC: 16.1 G/DL (ref 13–17.7)
INR PPP: 0.93 (ref 0.85–1.16)
MCH RBC QN AUTO: 31.6 PG (ref 26.6–33)
MCHC RBC AUTO-ENTMCNC: 34.9 G/DL (ref 31.5–35.7)
MCV RBC AUTO: 90.6 FL (ref 79–97)
PLATELET # BLD AUTO: 268 10*3/MM3 (ref 140–450)
PMV BLD AUTO: 9.3 FL (ref 6–12)
POTASSIUM SERPL-SCNC: 4.3 MMOL/L (ref 3.5–5.2)
PROTHROMBIN TIME: 12.2 SECONDS (ref 11.4–14.4)
QT INTERVAL: 390 MS
QTC INTERVAL: 466 MS
RBC # BLD AUTO: 5.09 10*6/MM3 (ref 4.14–5.8)
SARS-COV-2 RNA PNL SPEC NAA+PROBE: NOT DETECTED
SODIUM SERPL-SCNC: 141 MMOL/L (ref 136–145)
WBC # BLD AUTO: 8.31 10*3/MM3 (ref 3.4–10.8)

## 2021-10-08 PROCEDURE — 36415 COLL VENOUS BLD VENIPUNCTURE: CPT

## 2021-10-08 PROCEDURE — U0004 COV-19 TEST NON-CDC HGH THRU: HCPCS

## 2021-10-08 PROCEDURE — 93010 ELECTROCARDIOGRAM REPORT: CPT | Performed by: INTERNAL MEDICINE

## 2021-10-08 PROCEDURE — 85610 PROTHROMBIN TIME: CPT

## 2021-10-08 PROCEDURE — 85027 COMPLETE CBC AUTOMATED: CPT

## 2021-10-08 PROCEDURE — 83036 HEMOGLOBIN GLYCOSYLATED A1C: CPT

## 2021-10-08 PROCEDURE — C9803 HOPD COVID-19 SPEC COLLECT: HCPCS

## 2021-10-08 PROCEDURE — 80048 BASIC METABOLIC PNL TOTAL CA: CPT

## 2021-10-08 PROCEDURE — 93005 ELECTROCARDIOGRAM TRACING: CPT

## 2021-10-08 RX ORDER — CLARITHROMYCIN 500 MG/1
500 TABLET, COATED ORAL 2 TIMES DAILY
COMMUNITY
End: 2022-02-07

## 2021-10-08 NOTE — PAT
Patient viewed general [facility] education video as instructed in their preoperative information received from their surgeon.  Patient stated the general [facility] education video was viewed in its entirety and survey completed.  Copies of [facility] general education handouts (Incentive Spirometry, Meds to Beds Program, Patient Belongings, Pre-op skin preparation instructions, Blood Glucose testing, Visitor policy, Surgery FAQ, Code H) distributed to patient if not printed. Education related to the PAT pass and skin preparation for surgery (if applicable) completed in [facility] as a reinforcement to PAT education video. Patient instructed to return PAT pass provided today as well as completed skin preparation sheet (if applicable) on the day of procedure.     Additionally if patient had not viewed video yet but intended to view it at home or in our waiting area, then referred them to the handout with QR code/link provided during PAT visit.  Instructed patient to complete survey after viewing the video in its entirety.  Encouraged patient/family to read [facility] general education handouts thoroughly and notify PAT staff with any questions or concerns. Patient verbalized understanding of all information and priority content.    Patient to apply Chlorhexadine wipes  to surgical area (as instructed) the night before procedure and the AM of procedure. Wipes provided.      Covid done in [facility].

## 2021-10-11 ENCOUNTER — ANESTHESIA (OUTPATIENT)
Dept: PERIOP | Facility: HOSPITAL | Age: 51
End: 2021-10-11

## 2021-10-11 ENCOUNTER — ANESTHESIA EVENT (OUTPATIENT)
Dept: PERIOP | Facility: HOSPITAL | Age: 51
End: 2021-10-11

## 2021-10-11 ENCOUNTER — HOSPITAL ENCOUNTER (OUTPATIENT)
Facility: HOSPITAL | Age: 51
Setting detail: HOSPITAL OUTPATIENT SURGERY
Discharge: HOME | End: 2021-10-11
Attending: SURGERY | Admitting: SURGERY

## 2021-10-11 ENCOUNTER — ANESTHESIA EVENT CONVERTED (OUTPATIENT)
Dept: ANESTHESIOLOGY | Facility: HOSPITAL | Age: 51
End: 2021-10-11

## 2021-10-11 VITALS
WEIGHT: 306 LBS | BODY MASS INDEX: 39.27 KG/M2 | HEIGHT: 74 IN | SYSTOLIC BLOOD PRESSURE: 129 MMHG | OXYGEN SATURATION: 97 % | HEART RATE: 82 BPM | RESPIRATION RATE: 15 BRPM | DIASTOLIC BLOOD PRESSURE: 68 MMHG | TEMPERATURE: 98.1 F

## 2021-10-11 DIAGNOSIS — Z87.19 HISTORY OF BILATERAL INGUINAL HERNIAS: Primary | ICD-10-CM

## 2021-10-11 PROCEDURE — 25010000002 FENTANYL CITRATE (PF) 50 MCG/ML SOLUTION: Performed by: NURSE ANESTHETIST, CERTIFIED REGISTERED

## 2021-10-11 PROCEDURE — C1781 MESH (IMPLANTABLE): HCPCS | Performed by: SURGERY

## 2021-10-11 PROCEDURE — 25010000002 PROPOFOL 10 MG/ML EMULSION: Performed by: NURSE ANESTHETIST, CERTIFIED REGISTERED

## 2021-10-11 PROCEDURE — S0260 H&P FOR SURGERY: HCPCS | Performed by: PHYSICIAN ASSISTANT

## 2021-10-11 PROCEDURE — 49585 PR REPAIR UMBILICAL HERN,5+Y/O,REDUC: CPT | Performed by: PHYSICIAN ASSISTANT

## 2021-10-11 PROCEDURE — 25010000002 ONDANSETRON PER 1 MG: Performed by: NURSE ANESTHETIST, CERTIFIED REGISTERED

## 2021-10-11 PROCEDURE — 25010000002 DEXAMETHASONE SODIUM PHOSPHATE 10 MG/ML SOLUTION: Performed by: NURSE ANESTHETIST, CERTIFIED REGISTERED

## 2021-10-11 PROCEDURE — 25010000002 BUPRENORPHINE PER 0.1 MG: Performed by: NURSE ANESTHETIST, CERTIFIED REGISTERED

## 2021-10-11 PROCEDURE — 25010000002 NEOSTIGMINE 10 MG/10ML SOLUTION: Performed by: NURSE ANESTHETIST, CERTIFIED REGISTERED

## 2021-10-11 PROCEDURE — C1889 IMPLANT/INSERT DEVICE, NOC: HCPCS | Performed by: SURGERY

## 2021-10-11 PROCEDURE — 25010000002 SUCCINYLCHOLINE PER 20 MG: Performed by: NURSE ANESTHETIST, CERTIFIED REGISTERED

## 2021-10-11 PROCEDURE — 25010000002 DEXAMETHASONE PER 1 MG: Performed by: NURSE ANESTHETIST, CERTIFIED REGISTERED

## 2021-10-11 PROCEDURE — 94799 UNLISTED PULMONARY SVC/PX: CPT

## 2021-10-11 PROCEDURE — 49650 LAP ING HERNIA REPAIR INIT: CPT | Performed by: PHYSICIAN ASSISTANT

## 2021-10-11 DEVICE — ABSORBABLE WOUND CLOSURE DEVICE
Type: IMPLANTABLE DEVICE | Site: ABDOMEN | Status: FUNCTIONAL
Brand: V-LOC 180

## 2021-10-11 DEVICE — BARD 3DMAX MESH RIGHT LARGE
Type: IMPLANTABLE DEVICE | Site: ABDOMEN | Status: FUNCTIONAL
Brand: BARD 3DMAX MESH

## 2021-10-11 DEVICE — BARD 3DMAX MESH LEFT LARGE
Type: IMPLANTABLE DEVICE | Site: ABDOMEN | Status: FUNCTIONAL
Brand: BARD 3DMAX MESH

## 2021-10-11 RX ORDER — HYDRALAZINE HYDROCHLORIDE 20 MG/ML
5 INJECTION INTRAMUSCULAR; INTRAVENOUS
Status: DISCONTINUED | OUTPATIENT
Start: 2021-10-11 | End: 2021-10-11 | Stop reason: HOSPADM

## 2021-10-11 RX ORDER — FENTANYL CITRATE 50 UG/ML
INJECTION, SOLUTION INTRAMUSCULAR; INTRAVENOUS
Status: DISCONTINUED
Start: 2021-10-11 | End: 2021-10-11 | Stop reason: HOSPADM

## 2021-10-11 RX ORDER — ONDANSETRON 2 MG/ML
INJECTION INTRAMUSCULAR; INTRAVENOUS AS NEEDED
Status: DISCONTINUED | OUTPATIENT
Start: 2021-10-11 | End: 2021-10-11 | Stop reason: SURG

## 2021-10-11 RX ORDER — LABETALOL HYDROCHLORIDE 5 MG/ML
5 INJECTION, SOLUTION INTRAVENOUS
Status: DISCONTINUED | OUTPATIENT
Start: 2021-10-11 | End: 2021-10-11 | Stop reason: HOSPADM

## 2021-10-11 RX ORDER — DEXAMETHASONE SODIUM PHOSPHATE 4 MG/ML
INJECTION, SOLUTION INTRA-ARTICULAR; INTRALESIONAL; INTRAMUSCULAR; INTRAVENOUS; SOFT TISSUE AS NEEDED
Status: DISCONTINUED | OUTPATIENT
Start: 2021-10-11 | End: 2021-10-11 | Stop reason: SURG

## 2021-10-11 RX ORDER — SODIUM CHLORIDE, SODIUM LACTATE, POTASSIUM CHLORIDE, CALCIUM CHLORIDE 600; 310; 30; 20 MG/100ML; MG/100ML; MG/100ML; MG/100ML
9 INJECTION, SOLUTION INTRAVENOUS CONTINUOUS PRN
Status: DISCONTINUED | OUTPATIENT
Start: 2021-10-11 | End: 2021-10-11 | Stop reason: HOSPADM

## 2021-10-11 RX ORDER — BUPIVACAINE HYDROCHLORIDE 2.5 MG/ML
INJECTION, SOLUTION EPIDURAL; INFILTRATION; INTRACAUDAL
Status: COMPLETED | OUTPATIENT
Start: 2021-10-11 | End: 2021-10-11

## 2021-10-11 RX ORDER — FENTANYL CITRATE 50 UG/ML
50 INJECTION, SOLUTION INTRAMUSCULAR; INTRAVENOUS
Status: DISCONTINUED | OUTPATIENT
Start: 2021-10-11 | End: 2021-10-11 | Stop reason: HOSPADM

## 2021-10-11 RX ORDER — BUPRENORPHINE HYDROCHLORIDE 0.32 MG/ML
INJECTION INTRAMUSCULAR; INTRAVENOUS
Status: COMPLETED | OUTPATIENT
Start: 2021-10-11 | End: 2021-10-11

## 2021-10-11 RX ORDER — MIDAZOLAM HYDROCHLORIDE 1 MG/ML
1 INJECTION INTRAMUSCULAR; INTRAVENOUS
Status: DISCONTINUED | OUTPATIENT
Start: 2021-10-11 | End: 2021-10-11 | Stop reason: HOSPADM

## 2021-10-11 RX ORDER — ONDANSETRON 2 MG/ML
4 INJECTION INTRAMUSCULAR; INTRAVENOUS ONCE AS NEEDED
Status: DISCONTINUED | OUTPATIENT
Start: 2021-10-11 | End: 2021-10-11 | Stop reason: HOSPADM

## 2021-10-11 RX ORDER — NALOXONE HCL 0.4 MG/ML
0.4 VIAL (ML) INJECTION AS NEEDED
Status: DISCONTINUED | OUTPATIENT
Start: 2021-10-11 | End: 2021-10-11 | Stop reason: HOSPADM

## 2021-10-11 RX ORDER — PROMETHAZINE HYDROCHLORIDE 25 MG/1
25 SUPPOSITORY RECTAL ONCE AS NEEDED
Status: DISCONTINUED | OUTPATIENT
Start: 2021-10-11 | End: 2021-10-11 | Stop reason: HOSPADM

## 2021-10-11 RX ORDER — SODIUM CHLORIDE 0.9 % (FLUSH) 0.9 %
10 SYRINGE (ML) INJECTION EVERY 12 HOURS SCHEDULED
Status: DISCONTINUED | OUTPATIENT
Start: 2021-10-11 | End: 2021-10-11 | Stop reason: HOSPADM

## 2021-10-11 RX ORDER — GLYCOPYRROLATE 0.2 MG/ML
INJECTION INTRAMUSCULAR; INTRAVENOUS AS NEEDED
Status: DISCONTINUED | OUTPATIENT
Start: 2021-10-11 | End: 2021-10-11 | Stop reason: SURG

## 2021-10-11 RX ORDER — LIDOCAINE HYDROCHLORIDE 10 MG/ML
INJECTION, SOLUTION EPIDURAL; INFILTRATION; INTRACAUDAL; PERINEURAL AS NEEDED
Status: DISCONTINUED | OUTPATIENT
Start: 2021-10-11 | End: 2021-10-11 | Stop reason: SURG

## 2021-10-11 RX ORDER — FENTANYL CITRATE 50 UG/ML
INJECTION, SOLUTION INTRAMUSCULAR; INTRAVENOUS AS NEEDED
Status: DISCONTINUED | OUTPATIENT
Start: 2021-10-11 | End: 2021-10-11 | Stop reason: SURG

## 2021-10-11 RX ORDER — MAGNESIUM HYDROXIDE 1200 MG/15ML
LIQUID ORAL AS NEEDED
Status: DISCONTINUED | OUTPATIENT
Start: 2021-10-11 | End: 2021-10-11 | Stop reason: HOSPADM

## 2021-10-11 RX ORDER — OXYCODONE HYDROCHLORIDE AND ACETAMINOPHEN 5; 325 MG/1; MG/1
1 TABLET ORAL EVERY 4 HOURS PRN
Qty: 16 TABLET | Refills: 0 | Status: SHIPPED | OUTPATIENT
Start: 2021-10-11 | End: 2022-02-07

## 2021-10-11 RX ORDER — DROPERIDOL 2.5 MG/ML
0.62 INJECTION, SOLUTION INTRAMUSCULAR; INTRAVENOUS ONCE AS NEEDED
Status: DISCONTINUED | OUTPATIENT
Start: 2021-10-11 | End: 2021-10-11 | Stop reason: HOSPADM

## 2021-10-11 RX ORDER — PROMETHAZINE HYDROCHLORIDE 25 MG/1
25 TABLET ORAL ONCE AS NEEDED
Status: DISCONTINUED | OUTPATIENT
Start: 2021-10-11 | End: 2021-10-11 | Stop reason: HOSPADM

## 2021-10-11 RX ORDER — HYDROMORPHONE HYDROCHLORIDE 1 MG/ML
0.5 INJECTION, SOLUTION INTRAMUSCULAR; INTRAVENOUS; SUBCUTANEOUS
Status: DISCONTINUED | OUTPATIENT
Start: 2021-10-11 | End: 2021-10-11 | Stop reason: HOSPADM

## 2021-10-11 RX ORDER — IPRATROPIUM BROMIDE AND ALBUTEROL SULFATE 2.5; .5 MG/3ML; MG/3ML
3 SOLUTION RESPIRATORY (INHALATION) ONCE AS NEEDED
Status: DISCONTINUED | OUTPATIENT
Start: 2021-10-11 | End: 2021-10-11 | Stop reason: HOSPADM

## 2021-10-11 RX ORDER — SUCCINYLCHOLINE CHLORIDE 20 MG/ML
INJECTION INTRAMUSCULAR; INTRAVENOUS AS NEEDED
Status: DISCONTINUED | OUTPATIENT
Start: 2021-10-11 | End: 2021-10-11 | Stop reason: SURG

## 2021-10-11 RX ORDER — CEFAZOLIN SODIUM IN 0.9 % NACL 3 G/100 ML
3 INTRAVENOUS SOLUTION, PIGGYBACK (ML) INTRAVENOUS ONCE
Status: COMPLETED | OUTPATIENT
Start: 2021-10-11 | End: 2021-10-11

## 2021-10-11 RX ORDER — FAMOTIDINE 20 MG/1
20 TABLET, FILM COATED ORAL
Status: COMPLETED | OUTPATIENT
Start: 2021-10-11 | End: 2021-10-11

## 2021-10-11 RX ORDER — PROPOFOL 10 MG/ML
VIAL (ML) INTRAVENOUS AS NEEDED
Status: DISCONTINUED | OUTPATIENT
Start: 2021-10-11 | End: 2021-10-11 | Stop reason: SURG

## 2021-10-11 RX ORDER — MEPERIDINE HYDROCHLORIDE 25 MG/ML
12.5 INJECTION INTRAMUSCULAR; INTRAVENOUS; SUBCUTANEOUS
Status: DISCONTINUED | OUTPATIENT
Start: 2021-10-11 | End: 2021-10-11 | Stop reason: HOSPADM

## 2021-10-11 RX ORDER — SODIUM CHLORIDE 0.9 % (FLUSH) 0.9 %
3 SYRINGE (ML) INJECTION EVERY 12 HOURS SCHEDULED
Status: DISCONTINUED | OUTPATIENT
Start: 2021-10-11 | End: 2021-10-11 | Stop reason: HOSPADM

## 2021-10-11 RX ORDER — DOCUSATE SODIUM 100 MG/1
100 CAPSULE, LIQUID FILLED ORAL 2 TIMES DAILY
Qty: 30 CAPSULE | Refills: 1 | Status: SHIPPED | OUTPATIENT
Start: 2021-10-11 | End: 2022-02-07

## 2021-10-11 RX ORDER — LIDOCAINE HYDROCHLORIDE 10 MG/ML
0.5 INJECTION, SOLUTION EPIDURAL; INFILTRATION; INTRACAUDAL; PERINEURAL ONCE AS NEEDED
Status: COMPLETED | OUTPATIENT
Start: 2021-10-11 | End: 2021-10-11

## 2021-10-11 RX ORDER — DROPERIDOL 2.5 MG/ML
0.62 INJECTION, SOLUTION INTRAMUSCULAR; INTRAVENOUS AS NEEDED
Status: DISCONTINUED | OUTPATIENT
Start: 2021-10-11 | End: 2021-10-11 | Stop reason: HOSPADM

## 2021-10-11 RX ORDER — ROCURONIUM BROMIDE 10 MG/ML
INJECTION, SOLUTION INTRAVENOUS AS NEEDED
Status: DISCONTINUED | OUTPATIENT
Start: 2021-10-11 | End: 2021-10-11 | Stop reason: SURG

## 2021-10-11 RX ORDER — NEOSTIGMINE METHYLSULFATE 1 MG/ML
INJECTION, SOLUTION INTRAVENOUS AS NEEDED
Status: DISCONTINUED | OUTPATIENT
Start: 2021-10-11 | End: 2021-10-11 | Stop reason: SURG

## 2021-10-11 RX ORDER — SODIUM CHLORIDE 0.9 % (FLUSH) 0.9 %
10 SYRINGE (ML) INJECTION AS NEEDED
Status: DISCONTINUED | OUTPATIENT
Start: 2021-10-11 | End: 2021-10-11 | Stop reason: HOSPADM

## 2021-10-11 RX ORDER — SODIUM CHLORIDE 0.9 % (FLUSH) 0.9 %
3-10 SYRINGE (ML) INJECTION AS NEEDED
Status: DISCONTINUED | OUTPATIENT
Start: 2021-10-11 | End: 2021-10-11 | Stop reason: HOSPADM

## 2021-10-11 RX ORDER — DEXAMETHASONE SODIUM PHOSPHATE 10 MG/ML
INJECTION, SOLUTION INTRAMUSCULAR; INTRAVENOUS
Status: COMPLETED | OUTPATIENT
Start: 2021-10-11 | End: 2021-10-11

## 2021-10-11 RX ORDER — HYDROCODONE BITARTRATE AND ACETAMINOPHEN 5; 325 MG/1; MG/1
1 TABLET ORAL ONCE AS NEEDED
Status: DISCONTINUED | OUTPATIENT
Start: 2021-10-11 | End: 2021-10-11 | Stop reason: HOSPADM

## 2021-10-11 RX ADMIN — LIDOCAINE HYDROCHLORIDE 0.5 ML: 10 INJECTION, SOLUTION EPIDURAL; INFILTRATION; INTRACAUDAL; PERINEURAL at 06:36

## 2021-10-11 RX ADMIN — GLYCOPYRROLATE 0.4 MG: 0.2 INJECTION INTRAMUSCULAR; INTRAVENOUS at 10:27

## 2021-10-11 RX ADMIN — PROPOFOL 75 MCG/KG/MIN: 10 INJECTION, EMULSION INTRAVENOUS at 08:37

## 2021-10-11 RX ADMIN — ROCURONIUM BROMIDE 5 MG: 10 INJECTION, SOLUTION INTRAVENOUS at 07:44

## 2021-10-11 RX ADMIN — ROCURONIUM BROMIDE 20 MG: 10 INJECTION, SOLUTION INTRAVENOUS at 09:17

## 2021-10-11 RX ADMIN — PROPOFOL 250 MG: 10 INJECTION, EMULSION INTRAVENOUS at 07:44

## 2021-10-11 RX ADMIN — FENTANYL CITRATE 25 MCG: 50 INJECTION, SOLUTION INTRAMUSCULAR; INTRAVENOUS at 09:20

## 2021-10-11 RX ADMIN — FENTANYL CITRATE 50 MCG: 50 INJECTION INTRAMUSCULAR; INTRAVENOUS at 11:13

## 2021-10-11 RX ADMIN — FAMOTIDINE 20 MG: 20 TABLET ORAL at 06:36

## 2021-10-11 RX ADMIN — BUPRENORPHINE HYDROCHLORIDE 0.3 MG: 0.32 INJECTION INTRAMUSCULAR; INTRAVENOUS at 07:52

## 2021-10-11 RX ADMIN — DEXAMETHASONE SODIUM PHOSPHATE 8 MG: 4 INJECTION, SOLUTION INTRA-ARTICULAR; INTRALESIONAL; INTRAMUSCULAR; INTRAVENOUS; SOFT TISSUE at 08:05

## 2021-10-11 RX ADMIN — FENTANYL CITRATE 25 MCG: 50 INJECTION, SOLUTION INTRAMUSCULAR; INTRAVENOUS at 10:10

## 2021-10-11 RX ADMIN — BUPIVACAINE HYDROCHLORIDE 60 ML: 2.5 INJECTION, SOLUTION EPIDURAL; INFILTRATION; INTRACAUDAL; PERINEURAL at 07:52

## 2021-10-11 RX ADMIN — DEXAMETHASONE SODIUM PHOSPHATE 4 MG: 10 INJECTION, SOLUTION INTRAMUSCULAR; INTRAVENOUS at 07:52

## 2021-10-11 RX ADMIN — FENTANYL CITRATE 50 MCG: 50 INJECTION INTRAMUSCULAR; INTRAVENOUS at 11:50

## 2021-10-11 RX ADMIN — FENTANYL CITRATE 50 MCG: 50 INJECTION, SOLUTION INTRAMUSCULAR; INTRAVENOUS at 08:45

## 2021-10-11 RX ADMIN — NEOSTIGMINE METHYLSULFATE 5 MG: 0.5 INJECTION INTRAVENOUS at 10:27

## 2021-10-11 RX ADMIN — Medication 3 G: at 07:40

## 2021-10-11 RX ADMIN — LIDOCAINE HYDROCHLORIDE 50 MG: 10 INJECTION, SOLUTION EPIDURAL; INFILTRATION; INTRACAUDAL; PERINEURAL at 07:44

## 2021-10-11 RX ADMIN — ROCURONIUM BROMIDE 40 MG: 10 INJECTION, SOLUTION INTRAVENOUS at 07:56

## 2021-10-11 RX ADMIN — ROCURONIUM BROMIDE 25 MG: 10 INJECTION, SOLUTION INTRAVENOUS at 08:30

## 2021-10-11 RX ADMIN — ONDANSETRON 4 MG: 2 INJECTION INTRAMUSCULAR; INTRAVENOUS at 10:18

## 2021-10-11 RX ADMIN — SUCCINYLCHOLINE CHLORIDE 180 MG: 20 INJECTION, SOLUTION INTRAMUSCULAR; INTRAVENOUS at 07:45

## 2021-10-11 RX ADMIN — PROPOFOL 30 MG: 10 INJECTION, EMULSION INTRAVENOUS at 08:24

## 2021-10-11 RX ADMIN — PROPOFOL 100 MG: 10 INJECTION, EMULSION INTRAVENOUS at 07:56

## 2021-10-11 RX ADMIN — SODIUM CHLORIDE, POTASSIUM CHLORIDE, SODIUM LACTATE AND CALCIUM CHLORIDE 9 ML/HR: 600; 310; 30; 20 INJECTION, SOLUTION INTRAVENOUS at 06:36

## 2021-10-11 RX ADMIN — FENTANYL CITRATE 100 MCG: 50 INJECTION, SOLUTION INTRAMUSCULAR; INTRAVENOUS at 07:44

## 2021-10-11 NOTE — ANESTHESIA PROCEDURE NOTES
Bilateral TAPS      Patient location during procedure: OR  Start time: 10/11/2021 7:52 AM  Stop time: 10/11/2021 7:56 AM  Reason for block: at surgeon's request and post-op pain management  Performed by  Anesthesiologist: Ryan Flores MD  Preanesthetic Checklist  Completed: patient identified, IV checked, site marked, risks and benefits discussed, surgical consent, monitors and equipment checked, pre-op evaluation and timeout performed  Prep:  Pt Position: supine  Sterile barriers:mask, gloves, cap and washed/disinfected hands  Prep: ChloraPrep  Patient monitoring: blood pressure monitoring and continuous pulse oximetry  Procedure  Sedation:yes  Performed under: general  Guidance:ultrasound guided  ULTRASOUND INTERPRETATION. Using ultrasound guidance a 20 G gauge needle was placed in close proximity to the nerve, at which point, under ultrasound guidance anesthetic was injected in the area of the nerve and spread of the anesthesia was seen on ultrasound in close proximity thereto.  There were no abnormalities seen on ultrasound; a digital image was taken; and the patient tolerated the procedure with no complications. Images:still images obtained, printed/placed on chart    Laterality:Bilateral  Block Type:TAP  Injection Technique:single-shot  Needle Type:echogenic  Needle Gauge:20 G  Resistance on Injection: none    Medications Used: buprenorphine (BUPRENEX) injection, 0.3 mg  dexamethasone sodium phosphate injection, 4 mg  bupivacaine PF (MARCAINE) 0.25 % injection, 60 mL  Med admintered at 10/11/2021 7:52 AM      Post Assessment  Injection Assessment: negative aspiration for heme and incremental injection  Complications:no

## 2021-10-11 NOTE — ANESTHESIA POSTPROCEDURE EVALUATION
Patient: Jeffery Olivas    Procedure Summary     Date: 10/11/21 Room / Location:  [facility]  [facility]    Anesthesia Start: 0740 Anesthesia Stop: 1057    Procedures:       ROBOT ASSISTED LAPAROSCOPIC BILATERAL INGUINAL HERNIA REPAIR (Bilateral Abdomen)      OPEN UMBILICAL HERNIA REPAIR (N/A Abdomen) Diagnosis:     Surgeons: Alcides Chaudhary MD Provider: Ryan Flores MD    Anesthesia Type: general with block ASA Status: 3          Anesthesia Type: general with block    Vitals  Vitals Value Taken Time   /90 10/11/21 1057   Temp 97.8 °F (36.6 °C) 10/11/21 1057   Pulse 76 10/11/21 1057   Resp 18 10/11/21 1057   SpO2 92 % 10/11/21 1057           Post Anesthesia Care and Evaluation    Patient location during evaluation: PACU  Patient participation: complete - patient participated  Level of consciousness: awake  Pain score: 0  Pain management: adequate  Airway patency: patent  Anesthetic complications: No anesthetic complications  PONV Status: none  Cardiovascular status: acceptable, stable and blood pressure returned to baseline  Respiratory status: acceptable, nasal cannula and spontaneous ventilation  Hydration status: acceptable  No anesthesia care post op

## 2021-10-11 NOTE — ANESTHESIA PROCEDURE NOTES
Airway  Urgency: elective    Date/Time: 10/11/2021 7:46 AM  Airway not difficult    General Information and Staff    Patient location during procedure: OR  CRNA: Robyn Lan CRNA    Indications and Patient Condition  Indications for airway management: airway protection    Preoxygenated: yes  Mask difficulty assessment: 1 - vent by mask    Final Airway Details  Final airway type: endotracheal airway      Successful airway: ETT  Cuffed: yes   Successful intubation technique: direct laryngoscopy  Endotracheal tube insertion site: oral  Blade: Evon  Blade size: 3  ETT size (mm): 7.5  Cormack-Lehane Classification: grade I - full view of glottis  Placement verified by: chest auscultation and capnometry   Cuff volume (mL): 7  Measured from: lips  ETT/EBT  to lips (cm): 23  Number of attempts at approach: 1  Assessment: lips, teeth, and gum same as pre-op and atraumatic intubation

## 2021-10-11 NOTE — OP NOTE
Operative Report    Patient Name:  Jeffery Olivas  YOB: 1970  [id number]    10/11/2021      PREOPERATIVE DIAGNOSIS: Bilateral Inguinal Hernias, Umbilical Hernia, Obesity with BMI 39      POSTOPERATIVE DIAGNOSIS: Same        PROCEDURE PERFORMED:     1.  Robotic Assisted Laparoscopic Transabdominal Bilateral Inguinal Hernia Repair with Mesh   2.  Open primary umbilical hernia repair       SURGEON: Alcides Chaudhary MD      ASSISTANT: CHARMAINE Bhatia. was responsible for performing the following activities: Retraction, Suction, Irrigation, Suturing and Held/Positioned Camera and their skilled assistance was necessary for the success of this case.     SPECIMENS: None        ANESTHESIA: General with TAP blocks        FINDINGS:  1.  Bilateral small indirect inguinal hernias were encountered.  These were both repaired with a large Bard 3D max mesh in the right and left side preperitoneal space.  There was quite a bit of fat in the preperitoneal space and the cords were fatty bilaterally but there was no obvious evidence of a cord lipoma  2.  The defect at the base of the umbilicus was fairly small at only 1 cm, this was therefore repaired primarily       INDICATIONS:      This patient is a 51 y.o. male who presented to my clinic with complaints of left inguinal pain.  CT scan demonstrated evidence of bilateral inguinal hernias as well as a small umbilical hernia.  He was significantly symptomatic from these hernias.  I had a long discussion with him that he was at increased risk of recurrence related to his BMI of 39, however he was unable to work and significantly symptomatic from these hernias and because of this I offered him repair.  Pre-operative imaging including CT scan confirmed the diagnosis. The risks, benefits, and alternatives of the procedure were discussed with the patient and their family preoperatively and they agreed to proceed.          DESCRIPTION OF PROCEDURE:      After obtaining  informed consent, the patient was taken to the operating room and placed in the supine position on the operating room table. General anesthesia was initiated. A Dailey catheter was placed. The abdomen was prepped and draped in the usual sterile fashion. A time-out was properly performed. Antibiotics were given preoperatively. SCDs were properly placed on the patient and turned on.     Local Anesthetic was injected prior to all skin incisions.  A small stab incision was made in the patient's left upper quadrant at Lopez's point.  A Veress needle was then inserted into the abdominal cavity with aspiration and water drop test reassuring.  Pneumoperitoneum was established to 15 mmHg.  A vertical incision was made in the supraumbilical midline.  Next utilizing a 8 mm robotic Optiview trocar with a 5 mm 30 degree laparoscope, the abdomen was entered under direct laparoscopic visualization utilizing an Optiview technique in the supraumbilical midline.  The abdomen was surveyed with special attention to the viscera underlying the insertion and Veress needle site which were both found to be free of injury.  The Veress needle was then removed.   Next two additional 8 mm robotic trocars were placed laterally to the periumbilical trocar, one on the left and one on the right side.  The trocar in the midline was then upsized to a 12 mm trocar under direct visualization.. The abdomen was surveyed and laparoscopic exam demonstrated evidence of a indirect inguinal hernia on the right side and indirect inguinal hernia on the left side.  A large Bard 3D Max right and left-sided mesh was then inserted into the peritoneal cavity.  The da Priyanka Xi robotic system was then docked at the patient's bedside and targeted in the pelvis.     A peritoneal incision was then created using cautery on the under-surface of the abdominal wall starting at the midline and proceeding laterally towards the ASIS on the left side. The peritoneum was then  retracted downwards and the rectus muscle was swept upwards to develop the preperitoneal space.  The preperitoneal space was quite fatty.  The left preperitoneal space was dissected from the ASIS laterally to the pubic tubercle medially. The spermatic cord was retracted laterally which revealed a small indirect hernia sac.. This was bluntly  from the cord structures and delivered posteriorly allowing the peritoneum to lie flat.  The cord was quite fatty, however there was no obvious evidence of a cord lipoma. No direct hernia or femoral hernia defect was identified.  With the dissection complete, the large Bard 3D max left-sided mesh was delivered into the preperitoneal space. The inferior aspect of the mesh was placed inferior to Kulwinder's ligament and covering the sites of direct, indirect, and femoral potential defects.  The mesh was secured medially to Kulwinder's ligament and laterally to the abdominal wall using interrupted 2-0 Vicryl suture.  With this completed, the peritoneal flap was then elevated with great care to ensure proper apposition of the mesh between the peritoneum and rectus muscle without folding. The peritoneal flap was then closed using an absorbable 2-0 V-Loc suture.       A peritoneal incision was then created using cautery on the under-surface of the abdominal wall starting at the midline and proceeding laterally towards the ASIS on the right side. The peritoneum was then retracted downwards and the rectus muscle was swept upwards to develop the preperitoneal space.  The preperitoneal space was quite fatty.  The right preperitoneal space was dissected from the ASIS laterally to the pubic tubercle medially. The spermatic cord was retracted laterally which revealed an indirect hernia sac that was only mildly larger than the left side. This was bluntly  from the cord structures and delivered posteriorly allowing the peritoneum to lie flat.  The cord was quite fatty as well on  this side, however there was no obvious evidence of a cord lipoma. No direct hernia or femoral hernia defect was identified.  With the dissection complete, the large Bard 3D max right-sided mesh was delivered into the preperitoneal space. The inferior aspect of the mesh was placed inferior to Kulwinder's ligament and covering the sites of direct, indirect, and femoral potential defects.  The mesh was secured medially to Kulwinder's ligament and laterally to the abdominal wall using interrupted 2-0 Vicryl suture.  With this completed, the peritoneal flap was then elevated with great care to ensure proper apposition of the mesh between the peritoneum and rectus muscle without folding. The peritoneal flap was then closed using an absorbable 2-0 V-Loc suture.       The abdomen was surveyed and hemostasis was confirmed.  The fascia of the midline trocar site was closed under direct laparoscopic visualization using a Derek-Annabel device with an 0 Vicryl suture.  The remaining trochars were removed and pneumoperitoneum was released.  The da Priyanka XI robotic system was then undocked from the patient's bedside.     An infraumbilical incision was made with a 11 blade scalpel. Bovie electrocautery was then used to dissect down through the skin and subcutaneous tissues to the level of the anterior abdominal wall fascia. Using a tonsil clamp, a plane was developed superior to the umbilical stalk. The umbilicus was then transected with cautery deep to the dermis. With the umbilical stalk divided, the hernia sac was visible through the anterior abdominal wall defect.  The hernia sac was excised and the hernia was found to contain only some preperitoneal fat which was easily reduced.  The actual defect was fairly small at only around 1 cm so.  Because of this, I felt we would have to make the defect bigger to place mesh and therefore elected to proceed with primary repair.  The anterior abdominal wall fascia was circumferentially  cleared surrounding the defect.  The fascial defect was then closed primarily using interrupted 0 Nurolon suture. Hemostasis of the wound site was confirmed. The site was then copiously irrigated with warmed saline solution. The umbilical stalk was then reapproximated to the anterior abdominal wall fascia using a single interrupted 2-0 vicryl suture. The skin was then closed using 3-0 vicryl in the deep dermal layer and 4-0 monocryl in the subcuticular layer.  Mastisol and Steri-Strips were then applied to each wound site with a clean dry dressing overlying this     After the procedure, the patient was awakened, extubated, and taken to the postoperative anesthesia care unit for recovery. All needle, instrument, and lap counts were correct. I was personally present and performed all portions of the procedure. There were no immediate complications         Alcides Chaudhary MD  10/11/2021  10:46 EDT

## 2021-10-11 NOTE — H&P
"Pre-Op H&P  Jeffery Olivas  [id number]  1970    Chief complaint: \"I have a hernia\"    HPI:    Patient is a 51 y.o.male who presents with several month history of bilateral inguinal pain and periumbilical pain.  Is failed conservative therapy.  His CT scan shows a small bilateral inguinal hernia along with a periumbilical hernia.  He has been seen by Dr. Chaudhary and now scheduled for repair of above-mentioned hernias.    Review of Systems:  General ROS: negative for chills, fever or skin lesions;  No changes since last office visit.  Neg for recent sick exposure  Cardiovascular ROS: no chest pain or dyspnea on exertion  Respiratory ROS: no cough, shortness of breath, or wheezing    Allergies:   Allergies   Allergen Reactions   • Codeine GI Intolerance       Home Meds:    No current facility-administered medications on file prior to encounter.     Current Outpatient Medications on File Prior to Encounter   Medication Sig Dispense Refill   • esomeprazole (nexIUM) 40 MG capsule Take 1 capsule by mouth Daily. 30 capsule 5       PMH:   Past Medical History:   Diagnosis Date   • Wears glasses      PSH:    Past Surgical History:   Procedure Laterality Date   • ACHILLES TENDON SURGERY  2009   • APPENDECTOMY     • KNEE MENISCAL REPAIR Bilateral    • NASAL SEPTUM SURGERY     Patient denies allergy to contrast dye or latex    Immunization History:  Influenza: Yes  Pneumococcal: No  Tetanus: Up-to-date    Social History:   Tobacco:   Social History     Tobacco Use   Smoking Status Never Smoker   Smokeless Tobacco Never Used      Alcohol:     Social History     Substance and Sexual Activity   Alcohol Use Yes    Comment: occ       Vitals:           /97 (BP Location: Right arm, Patient Position: Lying)   Pulse 88   Temp 97 °F (36.1 °C) (Temporal)   Resp 18   Ht 188 cm (74\")   Wt (!) 139 kg (306 lb)   SpO2 95%   BMI 39.29 kg/m²     Physical Exam:  General Appearance:    Alert, cooperative, no distress, appears " stated age   Head:    Normocephalic, without obvious abnormality, atraumatic   Lungs:    Lear to auscultation bilaterally to the bases    Heart:  S1-S2 without rubs murmurs or gallops    Abdomen:   Soft, nontender, bowel sounds present throughout.   Breast Exam:    deferred   Genitalia:    deferred   Extremities:   Extremities normal, atraumatic, no cyanosis or edema   Skin:   Skin color, texture, turgor normal, no rashes or lesions   Neurologic:   Grossly intact   Results Review  LABS:  Lab Results   Component Value Date    WBC 8.31 10/08/2021    HGB 16.1 10/08/2021    HCT 46.1 10/08/2021    MCV 90.6 10/08/2021     10/08/2021    NEUTROABS 4.71 01/07/2019    GLUCOSE 130 (H) 10/08/2021    BUN 15 10/08/2021    CREATININE 0.94 10/08/2021    EGFRIFNONA 84 10/08/2021     10/08/2021    K 4.3 10/08/2021     10/08/2021    CO2 24.0 10/08/2021    CALCIUM 10.0 10/08/2021    ALBUMIN 4.80 01/07/2019    AST 24 01/07/2019    ALT 52 (H) 01/07/2019    BILITOT 0.5 01/07/2019    INR 0.93 10/08/2021       RADIOLOGY:  No radiology results for the last 3 days     I reviewed the patient's new clinical results.    Cancer Staging (if applicable)  Cancer Patient: __ yes __no __unknown; If yes, clinical stage T:__ N:__M:__, stage group or __N/A    Impression: Bilateral inguinal and periumbilical hernia  Hematuria, being worked up by Dr. Nunez  Hypertension  Obesity  Intolerance to codeine  Benign prostatic hypertrophy      Plan: Robotic repair of both bilateral inguinal hernia along with periumbilical hernia.      CHARMAINE Griggs   10/11/21   6:48 AM EDT

## 2021-10-11 NOTE — ANESTHESIA PREPROCEDURE EVALUATION
Anesthesia Evaluation     Patient summary reviewed and Nursing notes reviewed   no history of anesthetic complications:  NPO Solid Status: > 8 hours  NPO Liquid Status: > 2 hours           Airway   Mallampati: I  TM distance: >3 FB  Neck ROM: full  No difficulty expected  Dental - normal exam     Pulmonary    (+) sleep apnea, decreased breath sounds,   Cardiovascular   Exercise tolerance: good (4-7 METS)    Rhythm: regular  Rate: normal        Neuro/Psych  GI/Hepatic/Renal/Endo    (+) morbid obesity,  diabetes mellitus type 2 well controlled,     Musculoskeletal     Abdominal   (+) obese,     Abdomen: soft.   Substance History      OB/GYN          Other   arthritis,                      Anesthesia Plan    ASA 3     general with block     intravenous induction     Anesthetic plan, all risks, benefits, and alternatives have been provided, discussed and informed consent has been obtained with: patient.    Plan discussed with CRNA.

## 2021-10-11 NOTE — BRIEF OP NOTE
INGUINAL HERNIA REPAIR LAPAROSCOPIC WITH DAVINCI ROBOT  Progress Note    Jeffery Olivas  10/11/2021    Pre-op Diagnosis:   Bilateral inguinal hernias, umbilical hernia       Post-Op Diagnosis Codes:  Same    Procedure/CPT® Codes:        Procedure(s):  ROBOT ASSISTED LAPAROSCOPIC BILATERAL INGUINAL HERNIA REPAIR  OPEN UMBILICAL HERNIA REPAIR primary    Surgeon(s):  Alcides Chaudhary MD Huerta, Gustavo V, MD    Anesthesia: General with Block    Staff:   Circulator: Milligan, Bryanna, RN; Nivia Robledo RN; Victorino Miranda RN  Scrub Person: Brooke Nina  Vendor Representative: Sean Yoon  Nursing Assistant: Pallavi Jenkins CNA  Assistant: Alysa Arriaza PA  Assistant: Alysa Arriaza PA      Estimated Blood Loss: minimal    Urine Voided: 125 mL    Specimens:                None          Drains:   Urethral Catheter Silicone 16 Fr. (Active)       Findings: Small indirect bilateral hernias, quite a bit of fat in the preperitoneal space and a fatty cord, but no definite cord lipomas.  There was a small approximately 1 cm umbilical hernia defect which was repaired primarily    Complications: None immediate    Assistant: Alysa Arriaza PA  was responsible for performing the following activities: Retraction, Suction, Irrigation, Suturing and Held/Positioned Camera and their skilled assistance was necessary for the success of this case.    Alcides Chaudhary MD     Date: 10/11/2021  Time: 10:40 EDT

## 2021-10-11 NOTE — DISCHARGE INSTRUCTIONS
Okay for discharge when appropriate per PACU protocol.  Regular diet and activity as tolerated.  Please remind patient no lifting greater than 10 lbs for 2 weeks.   Rx provided for Percocet. Please instruct patient to transition to over the counter Tylenol and Ibuprofen as able.   Please remind patient to continue with daily stool softener. Rx provided for Docusate BID.   Ok to shower in 24 hrs. Let warm soapy water run over wounds. Pat dry do not scrub. Do not soak in tub bathe for 2 weeks.   Please instruction patient to call the [facility] [phone] if fever greater than 101.5, increased redness or drainage from wound sites, or worsening nausea or vomiting.

## 2022-02-07 ENCOUNTER — OFFICE VISIT (OUTPATIENT)
Dept: INTERNAL MEDICINE | Facility: CLINIC | Age: 52
End: 2022-02-07

## 2022-02-07 VITALS
HEART RATE: 103 BPM | TEMPERATURE: 96.3 F | HEIGHT: 74 IN | WEIGHT: 309 LBS | SYSTOLIC BLOOD PRESSURE: 130 MMHG | OXYGEN SATURATION: 98 % | DIASTOLIC BLOOD PRESSURE: 86 MMHG | BODY MASS INDEX: 39.66 KG/M2

## 2022-02-07 DIAGNOSIS — R49.0 HOARSE VOICE QUALITY: ICD-10-CM

## 2022-02-07 DIAGNOSIS — R22.1 SENSATION OF LUMP IN THROAT: ICD-10-CM

## 2022-02-07 DIAGNOSIS — N41.1 CHRONIC PROSTATITIS: Primary | ICD-10-CM

## 2022-02-07 DIAGNOSIS — Z23 NEED FOR SHINGLES VACCINE: ICD-10-CM

## 2022-02-07 DIAGNOSIS — Z23 NEED FOR INFLUENZA VACCINATION: ICD-10-CM

## 2022-02-07 PROCEDURE — 90750 HZV VACC RECOMBINANT IM: CPT | Performed by: STUDENT IN AN ORGANIZED HEALTH CARE EDUCATION/TRAINING PROGRAM

## 2022-02-07 PROCEDURE — 90686 IIV4 VACC NO PRSV 0.5 ML IM: CPT | Performed by: STUDENT IN AN ORGANIZED HEALTH CARE EDUCATION/TRAINING PROGRAM

## 2022-02-07 PROCEDURE — 99215 OFFICE O/P EST HI 40 MIN: CPT | Performed by: STUDENT IN AN ORGANIZED HEALTH CARE EDUCATION/TRAINING PROGRAM

## 2022-02-07 PROCEDURE — 90471 IMMUNIZATION ADMIN: CPT | Performed by: STUDENT IN AN ORGANIZED HEALTH CARE EDUCATION/TRAINING PROGRAM

## 2022-02-07 PROCEDURE — 90472 IMMUNIZATION ADMIN EACH ADD: CPT | Performed by: STUDENT IN AN ORGANIZED HEALTH CARE EDUCATION/TRAINING PROGRAM

## 2022-02-07 RX ORDER — TESTOSTERONE 16.2 MG/G
GEL TRANSDERMAL
COMMUNITY
End: 2022-04-01

## 2022-02-07 RX ORDER — DOXYCYCLINE HYCLATE 100 MG/1
CAPSULE ORAL EVERY 12 HOURS SCHEDULED
COMMUNITY
Start: 2021-12-27 | End: 2022-04-01

## 2022-02-07 NOTE — PROGRESS NOTES
New Patient Office Visit      Date: 2022      Patient Name: Jeffery Olivas  : 1970   MRN: [id number]     Chief Complaint:    Chief Complaint   Patient presents with   • Establish Care     Genital pain   • Mass     Mass on throat       History of Present Illness: Jeffery Olivas is a 52 y.o. male who presents to clinic today to establish care.  He has several issues he would like to discuss today:     Lump in Throat  This started 3 weeks ago. It is persistent but notices it more when he swallows. It began suddenly and has not changed in intensity. It is nonpainful. He has noticed and change in his voice and notes that it sounds more hoarse.  He denies pain, dysphagia, odynophagia, regurgitation, reflux, indigestion, halitosis, nausea, tobacco use, EtOH use (notes only very occasional use), weight loss.     Chronic Prostatitis   In 2021, he was working in his garage when he tore something in his groin. After this, he began having pain in his left testicle and in the area of his prostate. He is unable to stand or sit upright for more than 15 minutes without worsening pain that results in nausea. He has been out of work because of this since 2021 and has not received a paycheck since 2021 (still has a position at [employer] but is not promised the same one he had previously).  He had several hernias repaired in 10/2021 but this did not improve pain.  He followed with a urologist - Dr. Nunez who has been treating him for chronic prostatitis. He has been on several antibiotics and is now currently taking doxycycline. He notes that if he does not take this antibiotic for several days, he has blood in his urine. He feels like Dr. Nunez does not listen when he talks about his concerns. He feels that the amount of ejaculation he has decreased by a great deal when this started and reports it almost seems like there is retrograde ejaculation.  This has caused a lot of stress and frustration in  "his life. He has an appointment scheduled with Dr. Nunez later this week but does not want to go.     Subjective     Past Medical History:   Past Medical History:   Diagnosis Date   • Wears glasses        Past Surgical History:   Past Surgical History:   Procedure Laterality Date   • ACHILLES TENDON SURGERY  2009   • APPENDECTOMY     • INGUINAL HERNIA REPAIR Bilateral 10/11/2021    Procedure: ROBOT ASSISTED LAPAROSCOPIC BILATERAL INGUINAL HERNIA REPAIR;  Surgeon: Alcides Chaudhary MD;  Location: [facility] OR;  Service: Robotics - [employer];  Laterality: Bilateral;   • KNEE MENISCAL REPAIR Bilateral    • NASAL SEPTUM SURGERY     • UMBILICAL HERNIA REPAIR N/A 10/11/2021    Procedure: OPEN UMBILICAL HERNIA REPAIR;  Surgeon: Alcides Chaudhary MD;  Location: [facility] OR;  Service: General;  Laterality: N/A;       Family History:   Family History   Problem Relation Age of Onset   • Hiatal hernia Maternal Grandfather        Social History:   Social History     Socioeconomic History   • Marital status:    Tobacco Use   • Smoking status: Never Smoker   • Smokeless tobacco: Never Used   Substance and Sexual Activity   • Alcohol use: Defer     Comment: occ   • Drug use: Never   • Sexual activity: Yes     Partners: Female     Comment:        Medications:     Current Outpatient Medications:   •  doxycycline (VIBRAMYCIN) 100 MG capsule, Every 12 (Twelve) Hours., Disp: , Rfl:   •  Testosterone 20.25 MG/ACT (1.62%) gel, testosterone 20.25 mg/1.25 gram (1.62 %) transdermal gel pump  Apply 2 pumps every day by transdermal route., Disp: , Rfl:     Allergies:   Allergies   Allergen Reactions   • Codeine GI Intolerance       Objective     Physical Exam:   Vital Signs:   Vitals:    02/07/22 1026   BP: 130/86   Pulse: 103   Temp: 96.3 °F (35.7 °C)   SpO2: 98%   Weight: (!) 140 kg (309 lb)   Height: 188 cm (74\")   PainSc:   8     Body mass index is 39.67 kg/m².     Physical Exam  Vitals and nursing note reviewed. "   Constitutional:       General: He is not in acute distress.     Appearance: Normal appearance. He is not ill-appearing or toxic-appearing.   Cardiovascular:      Rate and Rhythm: Normal rate and regular rhythm.      Heart sounds: Normal heart sounds.   Pulmonary:      Effort: Pulmonary effort is normal.      Breath sounds: Normal breath sounds. No wheezing, rhonchi or rales.   Skin:     General: Skin is warm and dry.   Neurological:      Mental Status: He is alert.   Psychiatric:         Mood and Affect: Affect is tearful.         Behavior: Behavior normal.       Assessment / Plan      Assessment/Plan:   Diagnoses and all orders for this visit:    1. Chronic prostatitis (Primary)  Continue doxycycline 100 mg BID. Given other concerns, will refer to urology for a second opinion.     2. Hoarse voice quality  3. Sensation of lump in throat  Ambulatory Referral to ENT (Otolaryngology)    4. Need for influenza vaccination  FluLaval/Fluarix/Fluzone >6 Months    5. Need for shingles vaccine  Shingrix Vaccine    Follow Up:   Return in about 4 weeks (around 3/7/2022) for Recheck, 30 mnute visit .    Time:   I spent approximately 45 minutes providing clinical care for this patient; including review of patient's chart and provider documentation, face to face time spent with patient in examination room (obtaining history, performing physical exam, discussing diagnosis and management options), placing orders, and completing patient documentation.     Addendum (02/23/2022): Reviewed ENT office visit record. Fiberoptic laryngoscopy performed with posterior laryngeal edema, moderate to severe posterior glottic edema and narrow hypopharynx noted. No masses or lesions noted. Omeprazole 40 mg daily was started and he was referred to sleep medicine as narrow hypopharynx is indicative of PEREZ. He will follow up with them after sleep study. Speech therapy to be considered if muscle tension dysphonia has not improved.     Beatrice Vallejo,  MD  [facility]

## 2022-02-07 NOTE — PROGRESS NOTES
Immunization  Immunization performed in Right arm and left arm by Jose Alfredo Ellis MA. Patient tolerated the procedure well without complications.  02/07/22   Jose Alfredo Ellis MA    Answers for HPI/ROS submitted by the patient on 1/31/2022  What is the primary reason for your visit?: Other  Please describe your symptoms.: 1.  Establish new Primary Care Physician, 2.  Lump in throat, 3.  Discuss recent issues  Have you had these symptoms before?: No  How long have you been having these symptoms?: 1-2 weeks

## 2022-02-23 PROBLEM — S93.401A SPRAIN OF RIGHT ANKLE: Status: RESOLVED | Noted: 2018-04-21 | Resolved: 2022-02-23

## 2022-02-23 PROBLEM — N41.1 CHRONIC PROSTATITIS: Status: ACTIVE | Noted: 2022-02-23

## 2022-02-23 PROBLEM — S89.92XA KNEE INJURY, LEFT, INITIAL ENCOUNTER: Status: RESOLVED | Noted: 2018-04-21 | Resolved: 2022-02-23

## 2022-02-23 PROBLEM — R07.81 RIB PAIN ON LEFT SIDE: Status: RESOLVED | Noted: 2018-12-19 | Resolved: 2022-02-23

## 2022-02-23 RX ORDER — OMEPRAZOLE 40 MG/1
40 CAPSULE, DELAYED RELEASE ORAL DAILY
COMMUNITY

## 2022-03-29 ENCOUNTER — OFFICE VISIT (OUTPATIENT)
Dept: UROLOGY | Facility: CLINIC | Age: 52
End: 2022-03-29

## 2022-03-29 VITALS — OXYGEN SATURATION: 96 % | HEART RATE: 111 BPM | WEIGHT: 309 LBS | BODY MASS INDEX: 39.66 KG/M2 | HEIGHT: 74 IN

## 2022-03-29 DIAGNOSIS — M62.89 PELVIC FLOOR DYSFUNCTION: Primary | ICD-10-CM

## 2022-03-29 DIAGNOSIS — N41.1 CHRONIC PROSTATITIS: ICD-10-CM

## 2022-03-29 DIAGNOSIS — Z12.5 PROSTATE CANCER SCREENING: ICD-10-CM

## 2022-03-29 DIAGNOSIS — R39.9 LOWER URINARY TRACT SYMPTOMS (LUTS): ICD-10-CM

## 2022-03-29 DIAGNOSIS — R36.1 HEMATOSPERMIA: ICD-10-CM

## 2022-03-29 LAB
BILIRUB BLD-MCNC: ABNORMAL MG/DL
CLARITY, POC: CLEAR
COLOR UR: YELLOW
EXPIRATION DATE: ABNORMAL
GLUCOSE UR STRIP-MCNC: ABNORMAL MG/DL
KETONES UR QL: NEGATIVE
LEUKOCYTE EST, POC: NEGATIVE
Lab: ABNORMAL
NITRITE UR-MCNC: NEGATIVE MG/ML
PH UR: 6 [PH] (ref 5–8)
PROT UR STRIP-MCNC: ABNORMAL MG/DL
RBC # UR STRIP: NEGATIVE /UL
SP GR UR: 1.03 (ref 1–1.03)
UROBILINOGEN UR QL: NORMAL

## 2022-03-29 PROCEDURE — 81003 URINALYSIS AUTO W/O SCOPE: CPT | Performed by: UROLOGY

## 2022-03-29 PROCEDURE — 51798 US URINE CAPACITY MEASURE: CPT | Performed by: UROLOGY

## 2022-03-29 PROCEDURE — 99205 OFFICE O/P NEW HI 60 MIN: CPT | Performed by: UROLOGY

## 2022-03-29 RX ORDER — CYCLOBENZAPRINE HCL 10 MG
10 TABLET ORAL 2 TIMES DAILY PRN
Qty: 30 TABLET | Refills: 1 | Status: SHIPPED | OUTPATIENT
Start: 2022-03-29 | End: 2022-04-28

## 2022-03-29 NOTE — PROGRESS NOTES
Office Visit New Urology      Patient Name: Jeffery Olivas  : 1970   MRN: [id number]     Chief Complaint: LUTS/Pelvic Floor Pain     Referring Provider: Beatrice Vallejo MD    History of Present Illness: Jeffery Olivas is a 52 y.o. male who presents to Urology today for evaluation of lower urinary tract symptoms.  He reports onset of symptoms dating back to straining injury in .  Since that time he has been evaluated by urology, general surgery.  He has undergone bilateral hernia repair without significant improvement of symptoms.  With past urologist he has been diagnosed with chronic prostatitis, he has been on multiple series of prolonged antibiotic therapy without improvement in symptoms.  He has been started on testosterone replacement therapy without improvement in symptoms.  He has been started on finasteride without improvement in symptoms.  He primarily reports pelvic pain, discomfort, pain at the level of the prostate, left testicular discomfort.  He denies significant lower urinary tract symptoms such as intermittency, hesitancy, weakening of stream, dysuria.  He denies hematuria.  He denies history of urinary tract infection.    Additionally he reports decreased volume of ejaculate, consistent or initiating after injury.  He reports hematospermia.  He denies significant hematuria.    He reports that he must sit at a 45 degree to reduce pelvic pain.  He reports difficulty with driving due to pain.      Subjective      Review of System: Review of Systems   Constitutional: Negative for chills, fatigue, fever and unexpected weight change.   HENT: Negative for sore throat.    Eyes: Negative for visual disturbance.   Respiratory: Negative for cough, chest tightness and shortness of breath.    Cardiovascular: Negative for chest pain and leg swelling.   Gastrointestinal: Negative for blood in stool, constipation, diarrhea, nausea, rectal pain and vomiting.   Genitourinary: Negative for  decreased urine volume, difficulty urinating, dysuria, enuresis, flank pain, frequency, genital sores, hematuria and urgency.   Musculoskeletal: Negative for back pain and joint swelling.   Skin: Negative for rash and wound.   Neurological: Negative for seizures, speech difficulty, weakness and headaches.   Psychiatric/Behavioral: Negative for confusion, sleep disturbance and suicidal ideas. The patient is not nervous/anxious.       I have reviewed the ROS documented by my clinical staff, updated appropriately and I agree. Jett Sharma MD    Past Medical History:   Past Medical History:   Diagnosis Date   • Wears glasses        Past Surgical History:   Past Surgical History:   Procedure Laterality Date   • ACHILLES TENDON SURGERY  2009   • APPENDECTOMY     • INGUINAL HERNIA REPAIR Bilateral 10/11/2021    Procedure: ROBOT ASSISTED LAPAROSCOPIC BILATERAL INGUINAL HERNIA REPAIR;  Surgeon: Alcides Chaudhary MD;  Location: [facility];  Service: Robotics - [facility];  Laterality: Bilateral;   • KNEE MENISCAL REPAIR Bilateral    • NASAL SEPTUM SURGERY     • UMBILICAL HERNIA REPAIR N/A 10/11/2021    Procedure: OPEN UMBILICAL HERNIA REPAIR;  Surgeon: Alcides Chaudhary MD;  Location: [facility];  Service: General;  Laterality: N/A;       Family History:   Family History   Problem Relation Age of Onset   • Hiatal hernia Maternal Grandfather        Social History:   Social History     Socioeconomic History   • Marital status:    Tobacco Use   • Smoking status: Never Smoker   • Smokeless tobacco: Never Used   Substance and Sexual Activity   • Alcohol use: Defer     Comment: occ   • Drug use: Never   • Sexual activity: Yes     Partners: Female     Comment:        Medications:     Current Outpatient Medications:   •  omeprazole (priLOSEC) 40 MG capsule, Take 40 mg by mouth Daily., Disp: , Rfl:   •  Testosterone 20.25 MG/ACT (1.62%) gel, testosterone 20.25 mg/1.25 gram (1.62 %) transdermal gel pump  Apply 2 pumps  "every day by transdermal route., Disp: , Rfl:   •  cyclobenzaprine (FLEXERIL) 10 MG tablet, Take 1 tablet by mouth 2 (Two) Times a Day As Needed for Muscle Spasms for up to 30 days., Disp: 30 tablet, Rfl: 1  •  doxycycline (VIBRAMYCIN) 100 MG capsule, Every 12 (Twelve) Hours., Disp: , Rfl:     Allergies:   Allergies   Allergen Reactions   • Codeine GI Intolerance         Post void residual bladder scan:   20mL       Objective     Physical Exam:   Vital Signs:   Vitals:    03/29/22 0927   Pulse: 111   SpO2: 96%   Weight: (!) 140 kg (309 lb)   Height: 188 cm (74.02\")   PainSc:   8     Body mass index is 39.66 kg/m².     Physical Exam  Vitals and nursing note reviewed.   Constitutional:       Appearance: Normal appearance.   HENT:      Head: Normocephalic and atraumatic.   Cardiovascular:      Comments: Well perfused  Pulmonary:      Effort: Pulmonary effort is normal.   Abdominal:      General: Abdomen is flat.      Palpations: Abdomen is soft.   Musculoskeletal:         General: Normal range of motion.   Skin:     General: Skin is warm and dry.   Neurological:      General: No focal deficit present.      Mental Status: He is alert and oriented to person, place, and time. Mental status is at baseline.   Psychiatric:         Mood and Affect: Mood normal.         Behavior: Behavior normal.         Thought Content: Thought content normal.         Judgment: Judgment normal.         Genitourinary  Penis: circumcised penis, glans normal, no penile discharge.  No rashes/lesions.    Testes: descended bilaterally, no masses, right testicle nontender to palpation, left testicle tender to palpation. Remainder of scrotal contents normal. No hernia appreciated.  Sniffing and pain with palpation over the perineum and bulbar urethra, muscles of the pelvic floor.       Labs:   Brief Urine Lab Results  (Last result in the past 365 days)      Color   Clarity   Blood   Leuk Est   Nitrite   Protein   CREAT   Urine HCG        03/29/22 " 0934 Yellow   Clear   Negative   Negative   Negative   Trace                      Lab Results   Component Value Date    GLUCOSE 130 (H) 10/08/2021    CALCIUM 10.0 10/08/2021     10/08/2021    K 4.3 10/08/2021    CO2 24.0 10/08/2021     10/08/2021    BUN 15 10/08/2021    CREATININE 0.94 10/08/2021    EGFRIFNONA 84 10/08/2021    BCR 16.0 10/08/2021    ANIONGAP 13.0 10/08/2021       Lab Results   Component Value Date    WBC 8.31 10/08/2021    HGB 16.1 10/08/2021    HCT 46.1 10/08/2021    MCV 90.6 10/08/2021     10/08/2021       Images:   No Images in the past 120 days found..    Measures:   Tobacco:   Jeffery Olivas  reports that he has never smoked. He has never used smokeless tobacco.. I have educated him on the risk of diseases from using tobacco products.    Assessment / Plan      Assessment/Plan:   52 y.o. male is here today for pelvic pain, left testicular discomfort.  He has previously been treated and evaluated by outside hospital urologist for chronic prostatitis.  He has been on longstanding history of antibiotic therapy without improvement in symptoms.  He denies significant lower urinary tract symptoms.  Denies dysuria.  He does report symptoms onset after an injury, straining injury.  He has also been seen by general surgery and undergone bilateral inguinal hernia repair which has not significantly improved pain.  He denies pain associated with urination.  He does report decreased ejaculate volume, mild pain with ejaculation occurring after injury.  He reports hematospermia.  He reports single episode of hematuria after injury but denies recurring hematuria.    Diagnoses and all orders for this visit:    1. Pelvic floor dysfunction (Primary)  -     Ambulatory Referral to Physical Therapy Evaluate and treat, Pelvic Floor  -     cyclobenzaprine (FLEXERIL) 10 MG tablet; Take 1 tablet by mouth 2 (Two) Times a Day As Needed for Muscle Spasms for up to 30 days.  Dispense: 30 tablet; Refill:  1  -     Urine Culture - Urine, Urine, Random Void; Future    2. Chronic prostatitis  -     POC Urinalysis Dipstick, Automated    3. Prostate cancer screening  -     PSA Total+% Free (Serial); Future           Today we discussed in depth the etiology and management of pelvic floor dysfunction.  We discussed the pelvic floor dysfunction is a common condition in which the patient is unable to relax or coordinate the muscles of the pelvic floor with urination, bowel movement or intercourse.  We discussed that this is a common condition seen both men and women.  We discussed the role of the pelvic musculature and that it supports the bladder, prostate, rectum.  We discussed the intricate and dynamic interplay between the pelvic floor and these associated organs.  Discussed that the pelvic floor musculature added support and stability for these organ systems but can also result in symptoms including muscle spasms, pain with urination, bowel movement or sexual intercourse.  We discussed that the causes of pelvic floor dysfunction are often multifactorial.   We discussed that it can often take a prolonged period of multimodal therapy to improve symptoms.  We discussed conservative treatment strategies including biofeedback, pelvic floor physical therapy, medications, relaxing techniques.  We discussed that improving bowel habits and constipation is important.  We discussed the role of pelvic floor physical therapy and the importance of continued therapy.  Discussed that it often takes therapy over a longer period of time to see improvement.  We discussed the role of p.o. medications including muscle relaxants that can help to relax the pelvic floor musculature.  Discussed that these are generally safe but that these medications may have side effects the most common being drowsiness.  Discussed that common muscle relaxant medications include Flexeril, Baclofen, Robaxin. We discussed that these medications often require a  balance of how well it helps symptoms potential side effects.  We will continue to monitor.    We discussed management strategies including conservative measures to improve symptoms.  We discussed the importance of decreasing caffeinated and irritative beverages, increasing fluid hydration.  We discussed the role that obstipation can play in urinary symptoms and the importance of a bowel regimen.  We discussed medical management and medical options to improve urinary symptoms.     We discussed cystoscopy, in the setting of report of hematospermia, possible hematuria we discussed that cystoscopy will allow evaluation of the urethra, prostatic architecture/anatomy, health of the bladder.     We discussed that he does not have any urinary symptoms, symptoms consistent with infection.  Would not recommend continued prolonged courses of antibiotics which not improve symptoms.  Additionally discussed concern regarding using testosterone therapy to help with the symptoms, he denies any symptoms associated with hypogonadism and would not continue this therapy.  Would recommend work-up for pelvic floor dysfunction as a source of symptoms.    Would recommend obtaining a PSA due to report of hematospermia.    We discussed that based upon the work-up described above we will further decide an appropriate treatment plan.  We discussed that these procedures will help us identify whether p.o. medical management versus procedure for bladder outlet obstruction is warranted.    Follow Up:   Return in about 4 weeks (around 4/26/2022) for Follow up for Cystoscopy.    I spent approximately 60 minutes providing clinical care for this patient; including review of patient's chart and provider documentation, face to face time spent with patient in examination room (obtaining history, performing physical exam, discussing diagnosis and management options), placing orders, and completing patient documentation.     Jett Sharma MD  [facility]  [facility] [place]

## 2022-03-30 ENCOUNTER — PATIENT ROUNDING (BHMG ONLY) (OUTPATIENT)
Dept: UROLOGY | Facility: CLINIC | Age: 52
End: 2022-03-30

## 2022-03-30 NOTE — PROGRESS NOTES
A [employer] message has been sent to the patient for PATIENT ROUNDING with [facility].

## 2022-04-01 ENCOUNTER — TELEMEDICINE (OUTPATIENT)
Dept: INTERNAL MEDICINE | Facility: CLINIC | Age: 52
End: 2022-04-01

## 2022-04-01 DIAGNOSIS — G47.33 OBSTRUCTIVE SLEEP APNEA SYNDROME: ICD-10-CM

## 2022-04-01 DIAGNOSIS — M62.89 PELVIC FLOOR DYSFUNCTION: Primary | ICD-10-CM

## 2022-04-01 PROBLEM — N41.1 CHRONIC PROSTATITIS: Status: RESOLVED | Noted: 2022-02-23 | Resolved: 2022-04-01

## 2022-04-01 PROCEDURE — 99214 OFFICE O/P EST MOD 30 MIN: CPT | Performed by: STUDENT IN AN ORGANIZED HEALTH CARE EDUCATION/TRAINING PROGRAM

## 2022-04-01 NOTE — PROGRESS NOTES
Telehealth Visit      Patient Name: Jeffery Olivas  : 1970   MRN: [id number]     Jeffery Olivas is a 52 y.o. male who is presenting virtually to [facility] for assessment with Dr. Lucila Vallejo MD. The patient is seen remotely using [facility] My Chart. Patient is being seen via telehealth and stated they are in a secure environment for this session. The patient's condition being diagnosed/treated is appropriate for telemedicine. The provider identified herself as well as her credentials. The patient consents to be seen remotely.     Chief Complaint:    Chief Complaint   Patient presents with   • pelvic floor dysfunction       History of Present Illness: Jeffery Olivas presents to discuss recent appointments with ENT and urology.  ENT evaluated for globus sensation.  Some edema was noted and thought to be due to obstructive sleep apnea.  He had a sleep study and CPAP was prescribed.  He is unable to get the CPAP for up to 6 weeks unfortunately.  He was also started on omeprazole for possible component of reflux resulting in edema.    He was evaluated by Dr. Sharma with urology who feels symptoms he has been experiencing are most consistent with pelvic floor dysfunction.  Antibiotics were stopped and he was started on a muscle relaxer and pelvic floor physical therapy was ordered.  He has so much relief that he has found someone at listened to his concerns.    Subjective     I have reviewed and the following portions of the patient's history were updated as appropriate: past family history, past medical history, past social history, past surgical history and problem list.    Allergies:   Allergies   Allergen Reactions   • Codeine GI Intolerance       Objective     Physical Exam:  Vital Signs: There were no vitals filed for this visit.  There is no height or weight on file to calculate BMI.    Physical Exam  Constitutional:       General: He is not in acute distress.      Appearance: Normal appearance.   Neurological:      Mental Status: He is alert.   Psychiatric:         Mood and Affect: Mood normal.         Behavior: Behavior normal.          Assessment / Plan      Assessment/Plan:   Diagnoses and all orders for this visit:    1. Pelvic floor dysfunction (Primary)  Pelvic floor physical therapy has been ordered.  He is also taking Flexeril 10 mg twice daily as needed.  Has follow-up with Dr. Sharma later in 04/2022.  Antibiotics stopped as symptoms felt not to be related to chronic prostatitis.    2. Obstructive sleep apnea syndrome  Diagnosed by sleep study ordered by ENT.  Thought to be because of hoarse voice.  CPAP has been ordered but may take up to 6 weeks to get it.    3. GERD without Esophagitis   Signs of reflux noted on flex laryngoscopy.  Continue omeprazole 40 mg daily.    Follow Up:   Return for After follow up with urology , Recheck.    I spent approximately 20 minutes providing clinical care for this patient; including review of patient's chart and provider documentation, discussing the patients care including taking history and formulating a treatment plan, placing orders, and completing patient documentation.  Video was used to complete this appointment. The patient's identity was confirmed by using two demographic identifiers, full name and date of birth.  The patient confirmed their current location in [place], and this provider was located in [place], [place].     Beatrice Vallejo MD  [facility] [place]

## 2022-04-22 ENCOUNTER — LAB (OUTPATIENT)
Dept: LAB | Facility: HOSPITAL | Age: 52
End: 2022-04-22

## 2022-04-22 DIAGNOSIS — M62.89 PELVIC FLOOR DYSFUNCTION: ICD-10-CM

## 2022-04-22 DIAGNOSIS — Z12.5 PROSTATE CANCER SCREENING: ICD-10-CM

## 2022-04-22 PROCEDURE — 36415 COLL VENOUS BLD VENIPUNCTURE: CPT

## 2022-04-22 PROCEDURE — 84153 ASSAY OF PSA TOTAL: CPT

## 2022-04-22 PROCEDURE — 84154 ASSAY OF PSA FREE: CPT

## 2022-04-22 PROCEDURE — 87086 URINE CULTURE/COLONY COUNT: CPT

## 2022-04-23 LAB
BACTERIA SPEC AEROBE CULT: NO GROWTH
PSA FREE MFR SERPL: 31.1 %
PSA FREE SERPL-MCNC: 0.59 NG/ML
PSA SERPL-MCNC: 1.9 NG/ML (ref 0–4)

## 2022-04-26 ENCOUNTER — PROCEDURE VISIT (OUTPATIENT)
Dept: UROLOGY | Facility: CLINIC | Age: 52
End: 2022-04-26

## 2022-04-26 VITALS — BODY MASS INDEX: 39.66 KG/M2 | WEIGHT: 309 LBS | HEIGHT: 74 IN

## 2022-04-26 DIAGNOSIS — M62.89 PELVIC FLOOR DYSFUNCTION: ICD-10-CM

## 2022-04-26 DIAGNOSIS — R39.9 LOWER URINARY TRACT SYMPTOMS (LUTS): Primary | ICD-10-CM

## 2022-04-26 LAB
BILIRUB BLD-MCNC: NEGATIVE MG/DL
CLARITY, POC: CLEAR
COLOR UR: YELLOW
EXPIRATION DATE: NORMAL
GLUCOSE UR STRIP-MCNC: NEGATIVE MG/DL
KETONES UR QL: NEGATIVE
LEUKOCYTE EST, POC: NEGATIVE
Lab: NORMAL
NITRITE UR-MCNC: NEGATIVE MG/ML
PH UR: 6 [PH] (ref 5–8)
PROT UR STRIP-MCNC: NEGATIVE MG/DL
RBC # UR STRIP: NEGATIVE /UL
SP GR UR: 1.01 (ref 1–1.03)
UROBILINOGEN UR QL: NORMAL

## 2022-04-26 PROCEDURE — 81003 URINALYSIS AUTO W/O SCOPE: CPT | Performed by: UROLOGY

## 2022-04-26 PROCEDURE — 52000 CYSTOURETHROSCOPY: CPT | Performed by: UROLOGY

## 2022-04-26 NOTE — PROGRESS NOTES
Preprocedure diagnosis:  Lower urinary tract symptoms  Pelvic pain  Hematospermia    Postprocedure diagnosis:  Lower urinary tract symptoms  Pelvic pain  Hematospermia    Procedure:  Diagnostic Cystourethroscopy    Attending surgeon:  Jett Sharma MD    Anesthesia:  2% lidocaine jelly intraurethrally    Complications:  None    Indications:  52 y.o. male undergoing a diagnostic cystoscopy for the above mentioned indications.  Informed consent was obtained.      Findings:  Cystoscopic findings normal pendulous, bulbar, membranous urethra.  Within the prostatic urethra there was mild lateral lobe coaptation, without median lobe.   Within the urinary bladder formal cystoscopy was performed with normal bladder mucosa and no tumors, masses or stones. Right and left ureteral orifice were identified and in the normal orthotopic position.         Procedure:  The patient was placed in supine position and prepped and draped in sterile fashion with lidocaine jelly instill per the urethra for anesthesia 5 minutes prior to procedure start.  A brief timeout was performed including available nursing staff and the patient.  The 16 Fr digital flexible cystoscope was lubricated and gently advanced into the urethral meatus. The penile, bulbar, and membranous urethra appeared widely patent without obvious stricture or mucosal abnormality. Within the prostatic urethra there was mild lateral lobe coaptation, without median lobe. The cytoscope was then advanced into the bladder. The bladder was completely visualized starting with the trigone. There were bilateral orthotopic ureteral orifices. The posterior wall, lateral walls, anterior wall, and dome were completely visualized WITHOUT obvious mucosal lesions, tumors, stones, or trabeculations.  The cystoscope was retroflexed and the bladder neck and prostate were further visualized. The cystoscope was then gently withdrawn while visualizing the urethra and the procedure was then  terminated.  The patient tolerated the procedure well.      Follow Up:     Post procedurally we discussed normal urethral, prostatic, bladder anatomy.  He will continue with scheduled pelvic floor physical therapy.  He will follow-up in approximately 4 months after therapy for symptom check.  He is understanding agreeable plan of care.

## 2022-04-29 ENCOUNTER — OFFICE VISIT (OUTPATIENT)
Dept: INTERNAL MEDICINE | Facility: CLINIC | Age: 52
End: 2022-04-29

## 2022-04-29 VITALS
HEIGHT: 74 IN | OXYGEN SATURATION: 97 % | DIASTOLIC BLOOD PRESSURE: 80 MMHG | WEIGHT: 302 LBS | HEART RATE: 113 BPM | TEMPERATURE: 96.8 F | SYSTOLIC BLOOD PRESSURE: 132 MMHG | BODY MASS INDEX: 38.76 KG/M2

## 2022-04-29 DIAGNOSIS — G47.33 OBSTRUCTIVE SLEEP APNEA: ICD-10-CM

## 2022-04-29 DIAGNOSIS — M62.89 PELVIC FLOOR DYSFUNCTION: Primary | ICD-10-CM

## 2022-04-29 DIAGNOSIS — F43.0 STRESS REACTION: ICD-10-CM

## 2022-04-29 DIAGNOSIS — R73.01 IMPAIRED FASTING GLUCOSE: ICD-10-CM

## 2022-04-29 LAB
EXPIRATION DATE: ABNORMAL
HBA1C MFR BLD: 8 %
Lab: ABNORMAL

## 2022-04-29 PROCEDURE — 83036 HEMOGLOBIN GLYCOSYLATED A1C: CPT | Performed by: STUDENT IN AN ORGANIZED HEALTH CARE EDUCATION/TRAINING PROGRAM

## 2022-04-29 PROCEDURE — 99215 OFFICE O/P EST HI 40 MIN: CPT | Performed by: STUDENT IN AN ORGANIZED HEALTH CARE EDUCATION/TRAINING PROGRAM

## 2022-04-29 NOTE — PROGRESS NOTES
Internal Medicine Established Office Visit      Date: 2022     Patient Name: Jeffery Olivas  : 1970   MRN: [id number]     Chief Complaint:    Chief Complaint   Patient presents with   • Follow-up   • Pelvic Pain       History of Present Illness: Jeffery Olivas is a 52 y.o. male who presents to clinic today for follow up.     Chronic Pelvic Pain   Due to pelvic floor dysfunction after trauma. Cyclobenzaprine prescribed by urology which did not help with pain. Experiences worsening pain with activity including walking. 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 He also has trouble sitting in any position other than at a certain angle that takes pressure off his pelvis. He rates pain at a 8/10 today and reports that driving exacerbates pain. He is following with urology and is encouraged by the evaluation he has had thus far. Pelvic floor physical therapy was recommended but due to the fact that the therapist is quite booked, the first available appointment is in late May. He is hopeful that this will improve pain over multiple sessions so that it is more tolerable so he can return to work. He has been compliant with all recommended evaluation and treatment.     PEREZ   Sleep study revealed severe PEREZ and CPAP was recommended. There is a 6-8 week wait until he can get the machine. He purchased a mouth appliance which he feels has helped.     Subjective     I have reviewed and the following portions of the patient's history were updated as appropriate: past family history, past medical history, past social history, past surgical history and problem list.     Medications:     Current Outpatient Medications:   •  omeprazole (priLOSEC) 40 MG capsule, Take 40 mg by mouth Daily., Disp: , Rfl:     Allergies:   Allergies   Allergen Reactions   • Codeine GI Intolerance       Objective     Physical Exam:  "  Vital Signs:   Vitals:    04/29/22 0922   BP: 132/80   Pulse: 113   Temp: 96.8 °F (36 °C)   SpO2: 97%   Weight: (!) 137 kg (302 lb)   Height: 188 cm (74\")   PainSc:   8     Body mass index is 38.77 kg/m².     Physical Exam  Vitals and nursing note reviewed.   Constitutional:       Appearance: Normal appearance.   Cardiovascular:      Rate and Rhythm: Normal rate.   Pulmonary:      Effort: Pulmonary effort is normal. No respiratory distress.   Skin:     General: Skin is warm and dry.   Neurological:      Mental Status: He is alert.   Psychiatric:         Mood and Affect: Mood is depressed. Affect is tearful.         Behavior: Behavior normal.       Assessment / Plan      Assessment/Plan:   Diagnoses and all orders for this visit:    1. Pelvic floor dysfunction (Primary)  Management starts with pelvic floor physical therapy. First available appointment with pelvic floor physical therapist is late May. Due to continued pain that worsens with any activity, weight bearing, and even sitting in certain positions, he is unable to complete activities required of him at work. He has been compliant with all recommended evaluation and treatment and would like to do anything he can to improve pain so he can return to work.  I do believe with appropriate treatment, he will have pain improvement to return to work but time is needed for this to happen. I have recommended an additional 3 months away from work to work with pelvic floor physical therapy (delayed start due to the specialty physical therapy scheduling out to May, patient took first available appointment).      2. Obstructive sleep apnea  Diagnosed by sleep study. CPAP has been ordered but there is a delay in receiving by 6-8 weeks. He has purchased a mouth guard to wear until that time.     3. Stress reaction  He is under a considerable amount of stress related to ongoing debilitating pain and being out of work because of this. His wish is to improve pain enough that " he can return to work and he is willing to do whatever it takes to get to this point.     4. Impaired fasting glucose  A1c is now 8%. As this is the first elevated A1c, will confirm diagnosis at next appointment with fasting glucose check. Prefers to avoid medication if he can so discussed diet cahnges he can make. He will implement these changes and we will check fasting glucose at next appointment (will be too soon to check A1c) to confirm diagnosis.     Follow Up:   Return in about 7 weeks (around 6/14/2022) for Recheck, 30 minute appt please .    Time:  I spent approximately 45 minutes providing clinical care for this patient; including review of patient's chart and provider documentation, face to face time spent with patient in examination room (obtaining history, performing physical exam, discussing diagnosis and management options), placing orders, and completing patient documentation. He majority of time was spent with the patient, providing support and therapeutic listening as well as advice.    Beatrice Vallejo MD  [facility] [place]

## 2022-05-13 ENCOUNTER — CLINICAL SUPPORT (OUTPATIENT)
Dept: INTERNAL MEDICINE | Facility: CLINIC | Age: 52
End: 2022-05-13

## 2022-05-13 DIAGNOSIS — Z23 NEED FOR VACCINATION: ICD-10-CM

## 2022-05-13 PROCEDURE — 90471 IMMUNIZATION ADMIN: CPT | Performed by: STUDENT IN AN ORGANIZED HEALTH CARE EDUCATION/TRAINING PROGRAM

## 2022-05-13 PROCEDURE — 90750 HZV VACC RECOMBINANT IM: CPT | Performed by: STUDENT IN AN ORGANIZED HEALTH CARE EDUCATION/TRAINING PROGRAM

## 2022-05-13 NOTE — PROGRESS NOTES
Immunization  Immunization performed in left deltoid IM by Amelia Mustafa MA. Patient tolerated the procedure well without complications.  05/13/22   Amelia Mustafa MA

## 2022-05-23 ENCOUNTER — TREATMENT (OUTPATIENT)
Dept: PHYSICAL THERAPY | Facility: CLINIC | Age: 52
End: 2022-05-23

## 2022-05-23 DIAGNOSIS — M62.89 PELVIC FLOOR DYSFUNCTION: Primary | ICD-10-CM

## 2022-05-23 DIAGNOSIS — R10.2 PELVIC PAIN: ICD-10-CM

## 2022-05-23 DIAGNOSIS — M62.838 MUSCLE SPASM: ICD-10-CM

## 2022-05-23 PROCEDURE — 97163 PT EVAL HIGH COMPLEX 45 MIN: CPT | Performed by: PHYSICAL THERAPIST

## 2022-05-23 PROCEDURE — 97110 THERAPEUTIC EXERCISES: CPT | Performed by: PHYSICAL THERAPIST

## 2022-05-23 NOTE — PROGRESS NOTES
Physical Therapy Initial Evaluation and Plan of Care    Patient: Jeffery Olivas   : 1970  Diagnosis/ICD-10 Code:  Pelvic floor dysfunction [M62.89]  Referring practitioner: Jett Sharma MD  Date of Initial Visit: 2022  Today's Date: 2022  Patient seen for 1 sessions      Subjective  Pt visibly upset - in tears. Issues started in May 2021 was working in [employer]. 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 Was moving boxes and were very heavy and felt a sharp searing/tearing pain in groin area. Immediately upon injury there was blood in urine and semen. Immediately knew had done something but tried to go lay down because couldn't stand. Thought just pulled something but after a day or two not better. Went to  and it seems things have taken a lot longer since COVID to address things with medical visits. Saw PCP who sent referral to urologist. Put in order for ultrasound to assess for hernia.   Diagnosed with B inguinal and umbilical hernia. Addressed with surgery 10/2021 and hernia pain issues went away, but not the other. His urologist treated prostatitis with antibiotics but no affect. Since day of injury has not been able to work. His work allows him to be out for 52 weeks and he just passed that and has been let go from job. Feels not a provider for family and emotionally distressing. Affecting how he feels as a man due to affects on sex life. Saw new urologist in May who said has pelvic floor issues. States he had a scope and MD felt narrowing of urethra that made manipulating camera difficult.    Chief Complaint:   Chief Complaint   Patient presents with   • Initial Evaluation      Functional Outcome Measure: NIH-CPSI: : 68.9%    Pain  Pain with sitting and has to sit at 45 degree angle; pain with standing upright - after 15-20 minutes it is so bad makes him want to throw up; feels radiates up into abdomen contributing to nausea  Pain in prostate into L testicle; testicle doesn't hurt  unless touched. If hit one part of it feels like hit with a steel toed boot and radiates into prostate area. Has to wear underwear always.   Best: Pelvic #: 1 Worst: Pelvic #: 9  Pain almost non-existent lying down or sitting at 45 degree angle; excruciating to drive or ride in a car; driving across town has to stop and recline before continuing to drive. Has to do in stages.  Can't lift anything    Bladder function:  Starts to have discomfort increase if bladder is getting full - bladder or bowel after about half full; better after voids; blood in urine resolved a couple of days after injury; doesn't have to strain, no struggle to start  Increased frequency due to pain  Incontinence: no  Frequency of urination: every 1.5-2 hours  Discomfort with urination: with bladder filling not when urinating      Bowel function:  Feels bowels leaving body as it moves past that area; Any straining is painful; takes Fiber supplement; feels relief after a BM; is generally not constipated      Sexual activity  Sexually active: prior has had a normal healthy sex life; still can have some blood in semen 1 in 5 times. Affected intimate life. No pain with ejaculation; the volume of ejaculate is less - almost nonexistent. No force behind it. Feels it is 90-95% less than before. Sensation is still enjoyable but not the same as before. Erection no increase in pain  Post ejaculate: no relief of symptoms. Feels like it is going somewhere else. Nothing dribbles out later.     Prior PT or other treatment: none    Diagnostics:    PMH:   Past Medical History:   Diagnosis Date   • Wears glasses         Surgical HX:   Past Surgical History:   Procedure Laterality Date   • ACHILLES TENDON SURGERY  2009   • APPENDECTOMY     • INGUINAL HERNIA REPAIR Bilateral 10/11/2021    Procedure: ROBOT ASSISTED LAPAROSCOPIC BILATERAL INGUINAL HERNIA REPAIR;  Surgeon: Alcides Chaudhary MD;  Location: [facility];  Service: [facility] - [facility];  Laterality:  Bilateral;   • KNEE MENISCAL REPAIR Bilateral    • NASAL SEPTUM SURGERY     • UMBILICAL HERNIA REPAIR N/A 10/11/2021    Procedure: OPEN UMBILICAL HERNIA REPAIR;  Surgeon: Alcides Chaudhary MD;  Location: [facility] OR;  Service: General;  Laterality: N/A;          Meds:   Current Outpatient Medications:   •  omeprazole (priLOSEC) 40 MG capsule, Take 40 mg by mouth Daily., Disp: , Rfl:      Occupation: let go from job               Objective    Patient independently ambulates into clinic.         Verbal consent obtained for external pelvic exam/treatment with declined need for second person in room  Posture:forward head and shoulders; posterior pelvic tilt in sitting with rounded back  Palpation:  Sidelying:   Glutes: mild tension B, no TTP  Coccyx: normal position, no TTP     PELVIC FLOOR   External:               Sensation: intact B              Skin quality: no gross abnormalities              STP and bulbospongiosus: moderate tension B 4/10    Internal:   EAS: anal wink intact; moderate tension B  Puborectalis, iliococcygeus, coccygeus: moderate to severe tension L>R; reproduction of similar symptoms on L at 2-3 o'clock 7/10 TTP  incomplete relaxation of PFM                MMT: 2+/5      See flowsheet for details of treatment following evaluation.    Basic information was provided regarding pelvic floor anatomy, explanation of the function of the pelvic floor, pudendal nerve and contribution to symptoms.     Assessment/Plan:     Assessment/Plan    The patient is a 52 y.o. male who presents to physical therapy today for pelvic pain. Upon initial evaluation, the patient demonstrates the following impairments: moderate tension of pelvic floor muscles with reproduction of symptoms. Due to these impairments, the patient is unable to sit or stand for prolonged periods without pain. The patient would benefit from skilled pelvic PT services to address functional limitations and impairments and to improve patient quality of  life.      Goals:   STG's: 4 weeks  · Patient will report > 25% reduction in overall pain to assist with driving  · Patient will be able to sit 30 minutes before needing to stop due to pain for improved function with errands  · Patient will be able to perform HEP with minimal verbal cues    LTG's: By discharge  · Patient will report a decrease in pain to < 2/10 to allow for return to work  · Patient will report ability to sit for 2 hours without increased pain for improved function with travel  · Patient functional outcome measure test, NIH-CPSI improved score by >50 %  · Patient will be able to stand x 1 hour without increased pain to allow for return to work  · Patient will be independent with HEP      Plan  Therapy options: will be seen for skilled physical therapy services  Planned modality interventions: TENS, ultrasound, cryotherapy, thermotherapy (hydrocollator packs)  Planned therapy interventions: abdominal trunk stabilization, manual therapy, neuromuscular re-education, body mechanics training, flexibility, functional ROM exercises, gait training, home exercise program, joint mobilization, therapeutic activities, stretching, strengthening, spinal/joint mobilization, soft tissue mobilization and postural training, dry needling  Pt prognosis: good  Frequency: weekly  Duration in visits: weekly  Duration in weeks: 12  Treatment plan discussed with: patient    Treatment Time: 1307 - 1415  Timed:         Manual Therapy:    0     mins  30970;     Therapeutic Exercise:    10     mins  99601;     Neuromuscular Stephanie:    0    mins  01751;    Therapeutic Activity:     0     mins  42970;     Gait Trainin     mins  86566;     Ultrasound:     0     mins  76318;    Ionto                               0    mins   85114  Self Care                       0     mins   56159  Canalith Repos    00     mins 61215      Un-Timed:  Electrical Stimulation:    0     mins  93001 ( );  Dry Needling     0     mins  self-pay  Traction     0     mins 01128  Low Eval     0     Mins  61929  Mod Eval     0     Mins  01011  High Eval                       58     Mins  37363  Re-Eval                           0    mins  54793        Timed Treatment:   10   mins   Total Treatment:     68   mins    PT SIGNATURE:   Solo TovarDEEPAK License # [id number]    DATE TREATMENT INITIATED: 5/23/2022    Initial Certification  Certification Period: 8/21/2022  I certify that the therapy services are furnished while this patient is under my care.  The services outlined above are required by this patient, and will be reviewed every 90 days.       PHYSICIAN: Jett Sharma MD  NPI: [id number]                                           DATE: 05/23/2022     Please sign and return via fax to [phone]. Thank you, [facility].

## 2022-06-13 ENCOUNTER — TELEPHONE (OUTPATIENT)
Dept: PHYSICAL THERAPY | Facility: OTHER | Age: 52
End: 2022-06-13

## 2022-06-14 ENCOUNTER — TELEMEDICINE (OUTPATIENT)
Dept: INTERNAL MEDICINE | Facility: CLINIC | Age: 52
End: 2022-06-14

## 2022-06-14 DIAGNOSIS — F43.0 STRESS REACTION: ICD-10-CM

## 2022-06-14 DIAGNOSIS — R73.01 IMPAIRED FASTING GLUCOSE: ICD-10-CM

## 2022-06-14 DIAGNOSIS — M62.89 PELVIC FLOOR DYSFUNCTION: Primary | ICD-10-CM

## 2022-06-14 DIAGNOSIS — K21.9 GASTROESOPHAGEAL REFLUX DISEASE, UNSPECIFIED WHETHER ESOPHAGITIS PRESENT: ICD-10-CM

## 2022-06-14 DIAGNOSIS — G47.33 OSA (OBSTRUCTIVE SLEEP APNEA): ICD-10-CM

## 2022-06-14 PROCEDURE — 99214 OFFICE O/P EST MOD 30 MIN: CPT | Performed by: STUDENT IN AN ORGANIZED HEALTH CARE EDUCATION/TRAINING PROGRAM

## 2022-06-14 NOTE — PROGRESS NOTES
Telehealth Visit      Patient Name: Jeffery Olivas  : 1970   MRN: [id number]     Jeffery Olivas is a 52 y.o. male who is presenting virtually to [facility] for assessment with Dr. Lucila Vallejo MD. The patient is seen remotely using [facility] My Chart. Patient is being seen via telehealth and stated they are in a secure environment for this session. The patient's condition being diagnosed/treated is appropriate for telemedicine. The provider identified herself as well as her credentials. The patient consents to be seen remotely.     Chief Complaint:    Chief Complaint   Patient presents with   • Pelvic Pain       History of Present Illness: Jeffery Olivas presents via telehealth for follow up.     Chronic Pelvic Pain  - Due to pelvic floor dysfunction after trauma (PT note dated 2022 gives great history of events leading to pain)  - Follows with urology - Dr. Sharma.   -  Experiences worsening pain with activity including walking. 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 He also has trouble sitting in any position other than at a certain angle that takes pressure off his pelvis.   - He rates pain at a 8/10 today and reports that driving exacerbates pain.   - He is following with urology and is encouraged by the evaluation he has had thus far. Pelvic floor physical therapy was recommended and he has had his initial appointment. He is hopeful that this will improve pain over multiple sessions so that it is more tolerable so he can return to work.   - He has been compliant with all recommended evaluation and treatment.     PEREZ   - Felt like there was a lump in his throat so he was referred to ENT. Bronchoscopy demonstrated laryngeal edema that was thought to be due to PEREZ. Sleep study consistent with PEREZ. CPAP ordered 8 weeks ago but he has not heard from  anyone about this. Has already reached out to his ENT to figure out what to do. He bought a mouth appliance to use until he gets his CPAP.       Subjective     I have reviewed and the following portions of the patient's history were updated as appropriate: past family history, past medical history, past social history, past surgical history and problem list.    Allergies:   Allergies   Allergen Reactions   • Codeine GI Intolerance       Objective     Physical Exam:  Vital Signs: There were no vitals filed for this visit.  There is no height or weight on file to calculate BMI.    Physical Exam  Constitutional:       General: He is not in acute distress.     Appearance: He is not ill-appearing or toxic-appearing.   HENT:      Head: Normocephalic and atraumatic.   Pulmonary:      Effort: Pulmonary effort is normal. No respiratory distress.   Neurological:      Mental Status: He is alert.       Assessment / Plan      Assessment/Plan:   Diagnoses and all orders for this visit:    1. Pelvic floor dysfunction (Primary)  - Severe, ongoing pelvic pain. Follows with urology. Starting pelvic floor physical therapy.   - Due to continued pain that worsens with any activity, weight bearing, and even sitting in certain positions, he is unable to complete activities required of him at work. He has been compliant with all recommended evaluation and treatment and would like to do anything he can to improve pain so he can return to work.  I do believe with appropriate treatment, he will have pain improvement to return to work but time is needed for this to happen.      2. Obstructive sleep apnea  - Diagnosed by sleep study. CPAP has been ordered but there is a delay in receiving by 6-8 weeks. He has purchased a mouth guard to wear until that time.      3. Stress reaction  - He is under a considerable amount of stress related to ongoing debilitating pain and being out of work because of this. His wish is to improve pain enough that he  can return to work and he is willing to do whatever it takes to get to this point.      4. Impaired fasting glucose  - A1c at last apt was 8%.  When discussed at last appointment, he was reluctant to start medication. He wanted to trial lifestyle modifications to see if he can improve it to where he would not need medication.  Planned to repeat fasting blood glucose today and discuss treatment options if necessary today but appointment was changed to telehealth. Can have A1c repeated in 2 months at next appointment.     5. GERD   - Continue omeprazole 40 mg daily.     Follow Up:   Patient establishing with new provider at another [facility] office.     I spent approximately 30 minutes providing clinical care for this patient; including review of patient's chart and provider documentation, discussing the patients care including taking history and formulating a treatment plan, placing orders, and completing patient documentation. Video was used to complete this appointment. The patient's identity was confirmed by using two demographic identifiers, full name and date of birth.  The patient confirmed their current location in [place], and this provider was located in [place], [place].     Beatrice Vallejo MD  [facility]

## 2022-06-20 ENCOUNTER — TREATMENT (OUTPATIENT)
Dept: PHYSICAL THERAPY | Facility: CLINIC | Age: 52
End: 2022-06-20

## 2022-06-20 DIAGNOSIS — M62.89 PELVIC FLOOR DYSFUNCTION: Primary | ICD-10-CM

## 2022-06-20 DIAGNOSIS — M62.838 MUSCLE SPASM: ICD-10-CM

## 2022-06-20 DIAGNOSIS — R10.2 PELVIC PAIN: ICD-10-CM

## 2022-06-20 PROCEDURE — 97140 MANUAL THERAPY 1/> REGIONS: CPT | Performed by: PHYSICAL THERAPIST

## 2022-06-20 PROCEDURE — 97110 THERAPEUTIC EXERCISES: CPT | Performed by: PHYSICAL THERAPIST

## 2022-06-22 NOTE — PROGRESS NOTES
Physical Therapy Daily Progress Note  Patient: Jeffery Olivas   : 1970  Diagnosis/ICD-10 Code:  Pelvic floor dysfunction [M62.89]  Referring practitioner: Jett Sharma MD  Date of Initial Visit: Type: THERAPY  Noted: 2022  Today's Date: 2022  Patient seen for 2 sessions      Subjective   Jeffery Olivas reports no real change since last visit. Doing HEP daily.  Pain Rating (0-10): 4      Objective   verbal consent obtained for external pelvic exam/treatment with declined need for second person in room     See Exercise, Manual, and Modality Logs for complete treatment.       Assessment/Plan  Pt compliant with HEP and this was progressed to include hip stretching and PFM relaxation. He tolerated manual with mild discomfort that decreased with TPR. Will continue manual and progress as tolerated to decrease pain symptoms.     Progress per Plan of Care         0951/1030   Timed:         Manual Therapy:    30     mins  24250;     Therapeutic Exercise:    9     mins  60333;     Neuromuscular Stephanie:    0    mins  17043;    Therapeutic Activity:     0     mins  67960;     Gait Trainin     mins  52253;     Ultrasound:     0     mins  39252;    Ionto                               0    mins   85193  Self Care                       0     mins   78139  Canalith Repos               0    mins  50617    Un-Timed:  Electrical Stimulation:    0     mins  65330 ( );  Dry Needling     0     mins self-pay  Traction     0     mins 47345  Low Eval     0     Mins  51690  Mod Eval     0     Mins  74723  High Eval                       0     Mins  49543  Re-Eval                           0    mins  92695    Timed Treatment:   39   mins   Total Treatment:     39   mins    Solo Tovar PT  [place] License # [id number]  Physical Therapist

## 2022-06-29 ENCOUNTER — TREATMENT (OUTPATIENT)
Dept: PHYSICAL THERAPY | Facility: CLINIC | Age: 52
End: 2022-06-29

## 2022-06-29 DIAGNOSIS — M62.838 MUSCLE SPASM: ICD-10-CM

## 2022-06-29 DIAGNOSIS — R10.2 PELVIC PAIN: ICD-10-CM

## 2022-06-29 DIAGNOSIS — M62.89 PELVIC FLOOR DYSFUNCTION: Primary | ICD-10-CM

## 2022-06-29 PROCEDURE — 97110 THERAPEUTIC EXERCISES: CPT | Performed by: PHYSICAL THERAPIST

## 2022-06-29 PROCEDURE — 97140 MANUAL THERAPY 1/> REGIONS: CPT | Performed by: PHYSICAL THERAPIST

## 2022-07-06 ENCOUNTER — TREATMENT (OUTPATIENT)
Dept: PHYSICAL THERAPY | Facility: CLINIC | Age: 52
End: 2022-07-06

## 2022-07-06 DIAGNOSIS — M62.89 PELVIC FLOOR DYSFUNCTION: Primary | ICD-10-CM

## 2022-07-06 DIAGNOSIS — R10.2 PELVIC PAIN: ICD-10-CM

## 2022-07-06 DIAGNOSIS — M62.838 MUSCLE SPASM: ICD-10-CM

## 2022-07-06 PROCEDURE — 97140 MANUAL THERAPY 1/> REGIONS: CPT | Performed by: PHYSICAL THERAPIST

## 2022-07-09 NOTE — PROGRESS NOTES
Physical Therapy Daily Treatment Note  Patient: Jeffery Olivas   : 1970  Diagnosis/ICD-10 Code:  Pelvic floor dysfunction [M62.89]  Referring practitioner: Jett Sharma MD  Date of Initial Visit: Type: THERAPY  Noted: 2022  Today's Date: 2022  Patient seen for 4 sessions      Subjective   Jeffery Olivas reports doing HEP. Not noticing much change.   Pain Rating (0-10): 6      Objective   verbal consent obtained for external pelvic exam/treatment with declined need for second person in room     See Exercise, Manual, and Modality Logs for complete treatment.     Assessment/Plan  Pt tolerated manual to posterior hip mm and external PFM with mod discomfort that decreased with MFR and TPR. Will continue manual and progress as tolerated to decrease pain. Pt compliant with HEP and is to continue current stretching program at this time.     Progress per Plan of Care         1030/1115   Timed:         Manual Therapy:    38     mins  37271;     Therapeutic Exercise:    0     mins  89743;     Neuromuscular Stephanie:    0    mins  80236;    Therapeutic Activity:     5     mins  04156;     Gait Trainin     mins  20074;     Ultrasound:     0     mins  12900;    Ionto                               0    mins   39983  Self Care                       0     mins   15696  Canalith Repos               0    mins  32449    Un-Timed:  Electrical Stimulation:    0     mins  27938 ( );  Dry Needling     0     mins self-pay  Traction     0     mins 03879  Low Eval     0     Mins  28134  Mod Eval     0     Mins  52516  High Eval                       0     Mins  81438  Re-Eval                           0    mins  49804    Timed Treatment:   43   mins   Total Treatment:     43   mins    Solo Tovar PT  [place] License # [id number]  Physical Therapist

## 2022-07-11 ENCOUNTER — TREATMENT (OUTPATIENT)
Dept: PHYSICAL THERAPY | Facility: CLINIC | Age: 52
End: 2022-07-11

## 2022-07-11 DIAGNOSIS — R10.2 PELVIC PAIN: ICD-10-CM

## 2022-07-11 DIAGNOSIS — M62.838 MUSCLE SPASM: ICD-10-CM

## 2022-07-11 DIAGNOSIS — M62.89 PELVIC FLOOR DYSFUNCTION: Primary | ICD-10-CM

## 2022-07-11 PROCEDURE — 97110 THERAPEUTIC EXERCISES: CPT | Performed by: PHYSICAL THERAPIST

## 2022-07-11 PROCEDURE — 97140 MANUAL THERAPY 1/> REGIONS: CPT | Performed by: PHYSICAL THERAPIST

## 2022-07-11 NOTE — PROGRESS NOTES
Physical Therapy Daily Treatment Note  Patient: Jeffery Olivas   : 1970  Diagnosis/ICD-10 Code:  Pelvic floor dysfunction [M62.89]  Referring practitioner: Jett Sharma MD  Date of Initial Visit: Type: THERAPY  Noted: 2022  Today's Date: 2022  Patient seen for 5 sessions      Subjective   Jeffery Olivas reports not really change since last visit.   Pain Rating (0-10): 6      Objective   verbal consent obtained for internal pelvic exam/treatment with declined need for second person in room     See Exercise, Manual, and Modality Logs for complete treatment.       Assessment/Plan  Pt tolerated manual with mild discomfort. Internal treatment to PFM today rectally. Pt tolerated with mild discomfort. Pt compliant with HEP and is to continue at this time. Will continue manual and progress as tolerated to decrease pain.     Progress per Plan of Care         945/1030   Timed:         Manual Therapy:    37     mins  62941;     Therapeutic Exercise:    8     mins  68841;     Neuromuscular Stephanie:    0    mins  79039;    Therapeutic Activity:     0     mins  96132;     Gait Trainin     mins  10756;     Ultrasound:     0     mins  62575;    Ionto                               0    mins   04358  Self Care                       0     mins   61392  Canalith Repos               0    mins  70897    Un-Timed:  Electrical Stimulation:    0     mins  84983 ( );  Dry Needling     0     mins self-pay  Traction     0     mins 33535  Low Eval     0     Mins  80394  Mod Eval     0     Mins  23728  High Eval                       0     Mins  21135  Re-Eval                           0    mins  33518    Timed Treatment:   45   mins   Total Treatment:     45   mins    Solo Tovar PT  [place] License # [id number]  Physical Therapist

## 2022-10-17 ENCOUNTER — TREATMENT (OUTPATIENT)
Dept: PHYSICAL THERAPY | Facility: CLINIC | Age: 52
End: 2022-10-17

## 2022-10-17 DIAGNOSIS — R10.2 PELVIC PAIN: ICD-10-CM

## 2022-10-17 DIAGNOSIS — M62.838 MUSCLE SPASM: ICD-10-CM

## 2022-10-17 DIAGNOSIS — M62.89 PELVIC FLOOR DYSFUNCTION: Primary | ICD-10-CM

## 2022-10-17 PROCEDURE — 97140 MANUAL THERAPY 1/> REGIONS: CPT | Performed by: PHYSICAL THERAPIST

## 2022-10-17 NOTE — PROGRESS NOTES
Re-Assessment / Re-Certification        Patient: Jeffery Olivas   : 1970  Diagnosis/ICD-10 Code:  Pelvic floor dysfunction [M62.89]  Referring practitioner: Jett Sharma MD  Date of Initial Visit: Type: THERAPY  Noted: 2022  Today's Date: 10/17/2022  Patient seen for 6 sessions      Subjective:   Jeffery Olivas reports: issues with having to take care of parents in [place]. Family going to move to [place]. Always some discomfort with some of the exercise but within the last 2 weeks when just at rest was having more L testicle pain, not just when touching it. Radiating up like cord up into abdomen. Used to be able to sit upright 15 minutes before excruciating. Now it feels within 5 minutes.     Subjective Questionnaire: NIH-CPSI  Clinical Progress: worse  Home Program Compliance: Yes  Treatment has included: therapeutic exercise, neuromuscular re-education, manual therapy, therapeutic activity and moist heat    Subjective   Objective   verbal consent obtained for internal pelvic exam/treatment with declined need for second person in room   Moderate tension B iliacus, psoas with 7/10 TTP  Internal:   EAS: anal wink intact; moderate tension B  Puborectalis, iliococcygeus, coccygeus: moderate to severe tension L>R; reproduction of similar symptoms on L at 2-3 o'clock 7/10 TTP  incomplete relaxation of PFM                MMT: 2+/5    See flowsheet for details of treatment following reassessment.     Assessment/Plan  Progress to physical therapy goals is slow due to needing to be out of state caring for his parents. Pt last treated 22.Heworsened pain that is more consistent especially in L testicle with decreased sitting tolerance . He has met 1/3 short term goals and 0/5 long term goals. He will benefit from continued skilled physical therapy to address remaining impairments and functional limitations.   Progress toward previous goals: Partially Met    Goals:   STG's: 4 weeks  • Patient will report > 25%  reduction in overall pain to assist with driving  • Patient will be able to sit 30 minutes before needing to stop due to pain for improved function with errands  • Patient will be able to perform HEP with minimal verbal cues -met     LTG's: By discharge  • Patient will report a decrease in pain to < 2/10 to allow for return to work  • Patient will report ability to sit for 2 hours without increased pain for improved function with travel  • Patient functional outcome measure test, NIH-CPSI improved score by >50 %  • Patient will be able to stand x 1 hour without increased pain to allow for return to work  • Patient will be independent with HEP  Recommendations: Continue as planned  Timeframe: 2 months  Prognosis to achieve goals: fair    PT Signature: Solo Tovar PT      Based upon review of the patient's progress and continued therapy plan, it is my medical opinion that Jeffery Olivas should continue physical therapy treatment at [facility]  [address] [place] [address] [phone].    Signature: __________________________________    PHYSICIAN: Jett Sharma MD  NPI: [id number]                                        1033/1115  Manual Therapy:    38     mins  30555;  Therapeutic Exercise:    4     mins  84386;     Neuromuscular Stephanie:    0    mins  20992;    Therapeutic Activity:     0     mins  71555;     Gait Trainin     mins  72889;     Ultrasound:     0     mins  22272;    Electrical Stimulation:    0     mins  10178 ( );  Dry Needling     0     mins self-pay    Timed Treatment:   42   mins   Total Treatment:     42   mins

## 2022-11-02 ENCOUNTER — TREATMENT (OUTPATIENT)
Dept: PHYSICAL THERAPY | Facility: CLINIC | Age: 52
End: 2022-11-02

## 2022-11-02 DIAGNOSIS — M62.89 PELVIC FLOOR DYSFUNCTION: Primary | ICD-10-CM

## 2022-11-02 DIAGNOSIS — R10.2 PELVIC PAIN: ICD-10-CM

## 2022-11-02 DIAGNOSIS — M62.838 MUSCLE SPASM: ICD-10-CM

## 2022-11-02 PROCEDURE — 97140 MANUAL THERAPY 1/> REGIONS: CPT | Performed by: PHYSICAL THERAPIST

## 2022-11-04 NOTE — PROGRESS NOTES
Physical Therapy Daily Treatment Note  Patient: Jeffery Olivas   : 1970  Diagnosis/ICD-10 Code:  Pelvic floor dysfunction [M62.89]  Referring practitioner: Jett Sharma MD  Date of Initial Visit: Type: THERAPY  Noted: 2022  Today's Date: 2022  Patient seen for 7 sessions      Subjective   Jeffery Olivas reports no change in symptoms.   Pain Rating (0-10): 7      Objective   verbal consent obtained for internal pelvic exam/treatment with declined need for second person in room     See Exercise, Manual, and Modality Logs for complete treatment.     Assessment/Plan  Pt continues to have significant pain limiting function and mobility. He is back to doing HEP regularly. He tolerated manual today with moderate discomfort. Will continue manual and progress as tolerated to decrease pain.     Progress per Plan of Care            Timed:         Manual Therapy:    38     mins  89293;     Therapeutic Exercise:    5     mins  40113;     Neuromuscular Stephanie:    0    mins  87094;    Therapeutic Activity:     0     mins  22789;     Gait Trainin     mins  61257;     Ultrasound:     0     mins  93274;    Ionto                               0    mins   91562  Self Care                       0     mins   62564  Canalith Repos               0    mins  55732    Un-Timed:  Electrical Stimulation:    0     mins  81073 (MC );  Dry Needling     0     mins self-pay  Traction     0     mins 51839  Low Eval     0     Mins  75021  Mod Eval     0     Mins  73807  High Eval                       0     Mins  03301  Re-Eval                           0    mins  22438    Timed Treatment:   43   mins   Total Treatment:     43   mins    Solo Tovar PT  [place] License # [id number]  Physical Therapist

## 2022-11-07 ENCOUNTER — TREATMENT (OUTPATIENT)
Dept: PHYSICAL THERAPY | Facility: CLINIC | Age: 52
End: 2022-11-07

## 2022-11-07 DIAGNOSIS — M62.89 PELVIC FLOOR DYSFUNCTION: Primary | ICD-10-CM

## 2022-11-07 DIAGNOSIS — R10.2 PELVIC PAIN: ICD-10-CM

## 2022-11-07 DIAGNOSIS — M62.838 MUSCLE SPASM: ICD-10-CM

## 2022-11-07 PROCEDURE — 97140 MANUAL THERAPY 1/> REGIONS: CPT | Performed by: PHYSICAL THERAPIST

## 2022-11-07 PROCEDURE — 97530 THERAPEUTIC ACTIVITIES: CPT | Performed by: PHYSICAL THERAPIST

## 2022-11-07 NOTE — PROGRESS NOTES
Physical Therapy Daily Treatment Note  Patient: Jeffery Olivas   : 1970  Diagnosis/ICD-10 Code:  Pelvic floor dysfunction [M62.89]  Referring practitioner: Jett Sharma MD  Date of Initial Visit: Type: THERAPY  Noted: 2022  Today's Date: 2022  Patient seen for 8 sessions      Subjective   Jeffery Olivas reports things about the same. At the moment pain 5/10 but just got out of the car and that makes things worse. Bad day yesterday where could not get comfortable all day.         Objective   verbal consent obtained for internal pelvic exam/treatment with declined need for second person in room     See Exercise, Manual, and Modality Logs for complete treatment.       Assessment/Plan  Patient with continued pain limiting mobility and sitting.  He is compliant with HEP.  Patient tolerated manual to glutes and piriformis with mild discomfort.  Tolerated internal pelvic floor manual therapy with moderate discomfort.  PT to reach out to referring physician to determine possible benefit of rectal suppository for assisting with muscle relaxation.  Considering possible pudendal nerve involvement.  We will continue manual and progress as tolerated to decrease symptoms.    Progress per Plan of Care         09/945   Timed:         Manual Therapy:    35     mins  80882;     Therapeutic Exercise:    0     mins  74145;     Neuromuscular Stephanie:    0    mins  39638;    Therapeutic Activity:     8     mins  80352;     Gait Trainin     mins  55884;     Ultrasound:     0     mins  38921;    Ionto                               0    mins   60813  Self Care                       0     mins   52264  Canalith Repos               0    mins  58152    Un-Timed:  Electrical Stimulation:    0     mins  77830 ( );  Dry Needling     0     mins self-pay  Traction     0     mins 36502  Low Eval     0     Mins  32866  Mod Eval     0     Mins  39273  High Eval                       0     Mins  57380  Re-Eval                            0    mins  69939    Timed Treatment:   43   mins   Total Treatment:     43   mins    Solo Tovar, PT  [place] License # [id number]  Physical Therapist

## 2022-11-14 ENCOUNTER — TREATMENT (OUTPATIENT)
Dept: PHYSICAL THERAPY | Facility: CLINIC | Age: 52
End: 2022-11-14

## 2022-11-14 DIAGNOSIS — M62.838 MUSCLE SPASM: ICD-10-CM

## 2022-11-14 DIAGNOSIS — M62.89 PELVIC FLOOR DYSFUNCTION: Primary | ICD-10-CM

## 2022-11-14 DIAGNOSIS — R10.2 PELVIC PAIN: ICD-10-CM

## 2022-11-14 PROCEDURE — 97140 MANUAL THERAPY 1/> REGIONS: CPT | Performed by: PHYSICAL THERAPIST

## 2022-11-14 NOTE — PROGRESS NOTES
Physical Therapy Daily Treatment Note  PHYSICAL THERAPY    [address], [address]; [place] [address]  Patient: Jeffery Olivas   : 1970  Diagnosis/ICD-10 Code:  Pelvic floor dysfunction [M62.89]  Referring practitioner: Jett Sharma MD  Date of Initial Visit: Type: THERAPY  Noted: 2022  Today's Date: 2022  Patient seen for 9 sessions      Subjective   Jeffery Olivas reports normally if sitting upright if the pain starts increasing he will have to shift off of it and that will help. Within 5 minutes would be back to 1-2/10. Now that's getting harder to get it to go away. Had a couple of days where harder to get comfortable and usual things weren't working.   Pain Rating (0-10): 5      Objective   verbal consent obtained for internal pelvic exam/treatment with declined need for second person in room     See Exercise, Manual, and Modality Logs for complete treatment.       Assessment/Plan  Patient compliant with HEP he tolerated manual to internal pelvic floor muscles with moderate discomfort today.  We will continue manual and progress as tolerated to decrease pain symptoms.    Progress per Plan of Care         1030/1105   Timed:         Manual Therapy:    35     mins  72442;     Therapeutic Exercise:     0    mins  00767;     Neuromuscular Stephanie:    0    mins  85601;    Therapeutic Activity:     0     mins  13668;     Gait Trainin     mins  62424;     Ultrasound:     0     mins  82598;    Ionto                               0    mins   12804  Self Care                       0     mins   47682  Canalith Repos               0    mins  57839    Un-Timed:  Electrical Stimulation:    0     mins  80251 ( );  Dry Needling     0     mins self-pay  Traction     0     mins 40872  Low Eval     0     Mins  39343  Mod Eval     0     Mins  55261  High Eval                       0     Mins  95529  Re-Eval                           0    mins  94272    Timed Treatment:   35   mins   Total  Treatment:     35   mins    Solo Tovar, PT  [place] License # [id number]  Physical Therapist

## 2022-11-21 ENCOUNTER — TREATMENT (OUTPATIENT)
Dept: PHYSICAL THERAPY | Facility: CLINIC | Age: 52
End: 2022-11-21

## 2022-11-21 DIAGNOSIS — M62.838 MUSCLE SPASM: ICD-10-CM

## 2022-11-21 DIAGNOSIS — M62.89 PELVIC FLOOR DYSFUNCTION: Primary | ICD-10-CM

## 2022-11-21 DIAGNOSIS — R10.2 PELVIC PAIN: ICD-10-CM

## 2022-11-21 PROCEDURE — 97140 MANUAL THERAPY 1/> REGIONS: CPT | Performed by: PHYSICAL THERAPIST

## 2022-11-21 NOTE — PROGRESS NOTES
Re-Assessment / Progress Note  PHYSICAL THERAPY    [address], [address]; [place] [address]      Patient: Jeffery Olivas   : 1970  Diagnosis/ICD-10 Code:  Pelvic floor dysfunction [M62.89]  Referring practitioner: Jett Sharma MD  Date of Initial Visit: Type: THERAPY  Noted: 2022  Today's Date: 2022  Patient seen for 10 sessions      Subjective:   Jeffery Olivas reports: no real change in symptoms and sometimes feels things are getting worse.   Subjective Questionnaire: NIH-CPSI  Clinical Progress: unchanged  Home Program Compliance: Yes      Objective   verbal consent obtained for internal pelvic exam/treatment with declined need for second person in room   Moderate tension B iliacus, psoas with 7/10 TTP  Internal:   EAS: anal wink intact; moderate tension B  Puborectalis, iliococcygeus, coccygeus: moderate to severe tension L>R; reproduction of similar symptoms on L at 2-3 o'clock 7/10 TTP  incomplete relaxation of PFM    Assessment/Plan  Progress to physical therapy goals is slow. He has met 1/3 short term goals and 0/5 long term goals. Patient with continued pain limiting mobility and sitting.  He is compliant with HEP.  Patient tolerated manual to glutes and piriformis with mild discomfort.  Tolerated internal pelvic floor manual therapy with moderate discomfort.  PT has reached out to referring physician to determine possible benefit of rectal suppository for assisting with muscle relaxation.  Considering possible pudendal nerve involvement.  We will continue manual and progress as tolerated to decrease symptoms.    Goals:   STG's: 4 weeks  • Patient will report > 25% reduction in overall pain to assist with driving  • Patient will be able to sit 30 minutes before needing to stop due to pain for improved function with errands  • Patient will be able to perform HEP with minimal verbal cues -met     LTG's: By discharge  • Patient will report a decrease in pain to < 2/10 to allow for  seen by ER MD at bedside return to work  • Patient will report ability to sit for 2 hours without increased pain for improved function with travel  • Patient functional outcome measure test, NIH-CPSI improved score by >50 %  • Patient will be able to stand x 1 hour without increased pain to allow for return to work  • Patient will be independent with HEP      Recommendations: Continue as planned  Timeframe: 2 months  Prognosis to achieve goals: fair    PT Signature: Solo Tovar PT      Based upon review of the patient's progress and continued therapy plan, it is my medical opinion that Jeffery Olivas should continue physical therapy treatment at [facility]  [address] [place] [address] [phone].    Signature: __________________________________    PHYSICIAN: Jett Sharma MD  NPI: [id number]                                        0945/1030  Manual Therapy:    38     mins  13693;  Therapeutic Exercise:    5     mins  40173;     Neuromuscular Stephanie:    0    mins  49013;    Therapeutic Activity:     0     mins  23623;     Gait Trainin     mins  97983;     Ultrasound:     0     mins  94111;    Electrical Stimulation:    0     mins  60072 ( );  Dry Needling     0     mins self-pay    Timed Treatment:   43   mins   Total Treatment:     43   mins

## 2022-11-28 ENCOUNTER — TREATMENT (OUTPATIENT)
Dept: PHYSICAL THERAPY | Facility: CLINIC | Age: 52
End: 2022-11-28

## 2022-11-28 DIAGNOSIS — M62.89 PELVIC FLOOR DYSFUNCTION: Primary | ICD-10-CM

## 2022-11-28 DIAGNOSIS — R10.2 PELVIC PAIN: ICD-10-CM

## 2022-11-28 DIAGNOSIS — M62.838 MUSCLE SPASM: ICD-10-CM

## 2022-11-28 PROCEDURE — 97140 MANUAL THERAPY 1/> REGIONS: CPT | Performed by: PHYSICAL THERAPIST

## 2022-11-28 NOTE — PROGRESS NOTES
Physical Therapy Daily Treatment Note  PHYSICAL THERAPY    [address], [address]; [place] [address]  Patient: Jeffery Olivas   : 1970  Diagnosis/ICD-10 Code:  Pelvic floor dysfunction [M62.89]  Referring practitioner: Jett Sharma MD  Date of Initial Visit: Type: THERAPY  Noted: 2022  Today's Date: 2022  Patient seen for 11 sessions      Subjective   Jeffery Olivas reports about the same. States treatment does not seem to change things one way or the other. May have noticed a small difference after last treatment. Up until 4-6 weeks ago when in position where he got in a lot of pain he could get it to go away quickly with changing position or lying down. Would resolve in about 5 minutes. Now it takes much longer to get it down and at times not able to get it to go away. More limiting of activities now as concerned won't be able to get pain down. Feels things are wearing on him more mentally now.     Pain Rating (0-10): 5      Objective   verbal consent obtained for internal pelvic exam/treatment with declined need for second person in room     See Exercise, Manual, and Modality Logs for complete treatment.         Assessment/Plan  Pt continues to have significant pain limiting function. He tolerates manual with moderate discomfort that decreases with MFR and TPR. Will continue manual and progress as tolerated to decrease pain.     Progress per Plan of Care         0945/1030   Timed:         Manual Therapy:    40     mins  54693;     Therapeutic Exercise:    0     mins  87535;     Neuromuscular Stephanie:    0    mins  71995;    Therapeutic Activity:     0     mins  58138;     Gait Trainin     mins  33980;     Ultrasound:     0     mins  05482;    Ionto                               0    mins   64853  Self Care                       0     mins   20195  Canalith Repos               0    mins  36473    Un-Timed:  Electrical Stimulation:    0     mins  80061 ( );  Dry Needling      0     mins self-pay  Traction     0     mins 46943  Low Eval     0     Mins  14989  Mod Eval     0     Mins  88341  High Eval                       0     Mins  77344  Re-Eval                           0    mins  89303    Timed Treatment:   40   mins   Total Treatment:     40   mins    Solo Tovar PT  [place] License # [id number]  Physical Therapist

## 2022-12-05 ENCOUNTER — TREATMENT (OUTPATIENT)
Dept: PHYSICAL THERAPY | Facility: CLINIC | Age: 52
End: 2022-12-05

## 2022-12-05 DIAGNOSIS — M62.89 PELVIC FLOOR DYSFUNCTION: Primary | ICD-10-CM

## 2022-12-05 DIAGNOSIS — R10.2 PELVIC PAIN: ICD-10-CM

## 2022-12-05 DIAGNOSIS — M62.838 MUSCLE SPASM: ICD-10-CM

## 2022-12-05 PROCEDURE — 97140 MANUAL THERAPY 1/> REGIONS: CPT | Performed by: PHYSICAL THERAPIST

## 2022-12-06 NOTE — PROGRESS NOTES
Physical Therapy Daily Treatment Note  PHYSICAL THERAPY    [address], [address]; [place] [address]  Patient: Jeffery Olivas   : 1970  Diagnosis/ICD-10 Code:  Pelvic floor dysfunction [M62.89]  Referring practitioner: Jett Sharma MD  Date of Initial Visit: Type: THERAPY  Noted: 2022  Today's Date: 2022  Patient seen for 12 sessions      Subjective   Jeffery Olivas reports less issues this week where he couldn't get pain to go away.   Pain Rating (0-10): 5      Objective   verbal consent obtained for internal pelvic exam/treatment with declined need for second person in room     See Exercise, Manual, and Modality Logs for complete treatment.       Assessment/Plan  Pt with mild improvement in being able to reduce symptoms following prolonged sitting this week. Tolerated manual with moderate discomfort that decreased with TPR. Will continue manual and progress as tolerated to decrease pain.     Progress per Plan of Care         1030/1110   Timed:         Manual Therapy:    38     mins  30337;     Therapeutic Exercise:    0     mins  38047;     Neuromuscular Stephanie:    0    mins  96891;    Therapeutic Activity:     0     mins  03625;     Gait Trainin     mins  64059;     Ultrasound:     0     mins  65244;    Ionto                               0    mins   70278  Self Care                       0     mins   83705  Canalith Repos               0    mins  90698    Un-Timed:  Electrical Stimulation:    0     mins  50441 ( );  Dry Needling     0     mins self-pay  Traction     0     mins 25477  Low Eval     0     Mins  81448  Mod Eval     0     Mins  09385  High Eval                       0     Mins  09455  Re-Eval                           0    mins  54436    Timed Treatment:   38   mins   Total Treatment:     38   mins    Solo Tovar, PT  [place] License # [id number]  Physical Therapist

## 2022-12-12 ENCOUNTER — TREATMENT (OUTPATIENT)
Dept: PHYSICAL THERAPY | Facility: CLINIC | Age: 52
End: 2022-12-12

## 2022-12-12 DIAGNOSIS — M62.89 PELVIC FLOOR DYSFUNCTION: Primary | ICD-10-CM

## 2022-12-12 DIAGNOSIS — R10.2 PELVIC PAIN: ICD-10-CM

## 2022-12-12 DIAGNOSIS — M62.838 MUSCLE SPASM: ICD-10-CM

## 2022-12-12 PROCEDURE — DRYNDLTRIAL DRY NEEDLING TRIAL: Performed by: PHYSICAL THERAPIST

## 2022-12-12 PROCEDURE — 97140 MANUAL THERAPY 1/> REGIONS: CPT | Performed by: PHYSICAL THERAPIST

## 2022-12-12 PROCEDURE — 97530 THERAPEUTIC ACTIVITIES: CPT | Performed by: PHYSICAL THERAPIST

## 2022-12-12 NOTE — PROGRESS NOTES
Physical Therapy Daily Treatment Note  PHYSICAL THERAPY    [address], [address]; [place] [address]  Patient: Jeffery Olivas   : 1970  Diagnosis/ICD-10 Code:  Pelvic floor dysfunction [M62.89]  Referring practitioner: Jett Sharma MD  Date of Initial Visit: Type: THERAPY  Noted: 2022  Today's Date: 2022  Patient seen for 13 sessions      Subjective   Jeffery Olivas reports no change in symptoms. He is following up with urologist this week.   Pain Rating (0-10): 5      Objective   verbal consent obtained for dry needling.     See Exercise, Manual, and Modality Logs for complete treatment.     Patient Education: on dry needling     Assessment/Plan  Pt has had limited change in symptoms with pelvic PT. Did trial of dry needling today to assess response. Pt to follow up with urologist this week to explore further options and was provided information for Liyah Dyer who treat pudendal neuralgia and perform nerve blocks to explore further treatment options.     Progress per Plan of Care            Timed:         Manual Therapy:    10     mins  59783;     Therapeutic Exercise:    0     mins  80984;     Neuromuscular Stephanie:    0    mins  24289;    Therapeutic Activity:     20     mins  83435;     Gait Trainin     mins  35257;     Ultrasound:     0     mins  26843;    Ionto                               0    mins   34455  Self Care                       0     mins   30323  Canalith Repos               0    mins  88209    Un-Timed:  Electrical Stimulation:    0     mins  74990 ( );  Dry Needling   _5_ mins [id number] - no charge  Traction     0     mins 04394  Low Eval     0     Mins  14716  Mod Eval     0     Mins  48489  High Eval                       0     Mins  85101  Re-Eval                           0    mins  33425    Timed Treatment:   30   mins   Total Treatment:     35   mins    Solo Tovar PT  [place] License # [id number]  Physical Therapist

## 2022-12-13 ENCOUNTER — OFFICE VISIT (OUTPATIENT)
Dept: UROLOGY | Facility: CLINIC | Age: 52
End: 2022-12-13

## 2022-12-13 VITALS — WEIGHT: 302 LBS | HEIGHT: 74 IN | BODY MASS INDEX: 38.76 KG/M2

## 2022-12-13 DIAGNOSIS — M62.89 PELVIC FLOOR DYSFUNCTION: Primary | ICD-10-CM

## 2022-12-13 PROCEDURE — 99214 OFFICE O/P EST MOD 30 MIN: CPT | Performed by: UROLOGY

## 2022-12-13 NOTE — PROGRESS NOTES
Follow Up Office Visit      Patient Name: Jeffery Olivas  : 1970   MRN: [id number]     Chief Complaint: Pelvic floor dysfunction    History of Present Illness: Jeffery Olivas is a 52 y.o. male who presents today for follow up due to symptoms related to pelvic floor dysfunction.  He has been following with pelvic floor physical therapy, reports only minimal improvement in symptoms with pelvic floor physical therapy.  Continues to report perineal discomfort, lower back discomfort, testicular discomfort.  He reports that pain is positional, improves when he is sitting in a 45 degree angle.  Denies obstructive based urinary symptoms.  Denies dysuria or hematuria.    At last visit we discussed trial of muscle relaxant, he denies improvement in symptoms with p.o. Robaxin    IPSS 6    Subjective      Review of System: Review of Systems   Genitourinary: Negative for decreased urine volume, difficulty urinating, dysuria, enuresis, flank pain, frequency, hematuria and urgency.      I have reviewed the ROS documented by my clinical staff, updated as appropriate and I agree. Jett Sharma MD    I have reviewed and the following portions of the patient's history were updated as appropriate: past family history, past medical history, past social history, past surgical history and problem list.    Medications:     Current Outpatient Medications:   •  Multiple Vitamins-Minerals (MULTIVITAMIN ADULT EXTRA C PO), multivitamin, Disp: , Rfl:   •  omeprazole (priLOSEC) 40 MG capsule, Take 40 mg by mouth Daily., Disp: , Rfl:     Allergies:   Allergies   Allergen Reactions   • Codeine GI Intolerance       IPSS Questionnaire (AUA-7):  Over the past month…    1)  Incomplete Emptying  How often have you had a sensation of not emptying your bladder?  3 - About half the time   2)  Frequency  How often have you had to urinate less than every two hours? 1 - Less than 1 time in 5   3)  Intermittency  How often have you found you  "stopped and started again several times when you urinated?  0 - Not at all   4) Urgency  How often have you found it difficult to postpone urination?  0 - Not at all   5) Weak Stream  How often have you had a weak urinary stream?  1 - Less than 1 time in 5   6) Straining  How often have you had to push or strain to begin urination?  0 - Not at all   7) Nocturia  How many times did you typically get up at night to urinate?  1 - 1 time   Total Score:  6       Quality of life due to urinary symptoms:  If you were to spend the rest of your life with your urinary condition the way it is now, how would you feel about that? 6-Terrible   Urine Leakage (Incontinence) 0-No Leakage         Objective     Physical Exam:   Vital Signs:   Vitals:    12/13/22 1135   Weight: (!) 137 kg (302 lb)   Height: 188 cm (74.02\")   PainSc: 0-No pain     Body mass index is 38.76 kg/m².     Physical Exam  Vitals and nursing note reviewed.   Constitutional:       Appearance: Normal appearance.   HENT:      Head: Normocephalic and atraumatic.   Cardiovascular:      Comments: Well perfused  Pulmonary:      Effort: Pulmonary effort is normal.   Abdominal:      General: Abdomen is flat.      Palpations: Abdomen is soft.   Musculoskeletal:         General: Normal range of motion.   Skin:     General: Skin is warm and dry.   Neurological:      General: No focal deficit present.      Mental Status: He is alert and oriented to person, place, and time. Mental status is at baseline.   Psychiatric:         Mood and Affect: Mood normal.         Behavior: Behavior normal.         Thought Content: Thought content normal.         Judgment: Judgment normal.             Labs:   Brief Urine Lab Results  (Last result in the past 365 days)      Color   Clarity   Blood   Leuk Est   Nitrite   Protein   CREAT   Urine HCG        04/26/22 1125 Yellow   Clear   Negative   Negative   Negative   Negative                 Urine Culture    Urine Culture 4/22/22   Urine " Culture No growth              Lab Results   Component Value Date    GLUCOSE 130 (H) 10/08/2021    CALCIUM 10.0 10/08/2021     10/08/2021    K 4.3 10/08/2021    CO2 24.0 10/08/2021     10/08/2021    BUN 15 10/08/2021    CREATININE 0.94 10/08/2021    EGFRIFNONA 84 10/08/2021    BCR 16.0 10/08/2021    ANIONGAP 13.0 10/08/2021       Lab Results   Component Value Date    WBC 8.31 10/08/2021    HGB 16.1 10/08/2021    HCT 46.1 10/08/2021    MCV 90.6 10/08/2021     10/08/2021       Images:   No Images in the past 120 days found..    Measures:   Tobacco:   Jeffery Olivas  reports that he has never smoked. He has never used smokeless tobacco.. I have educated him on the risk of diseases from using tobacco products.  Assessment / Plan      Assessment/Plan:   52 y.o. male is seen today for follow up due to symptoms related to pelvic floor dysfunction.  He continues to report bother associated with symptoms.  He is currently working with pelvic floor physical therapy.  We have encouraged him to continue to work with pelvic floor physical therapy for all available options for treatment.  We have recommended that he establish with primary care, undergo possible evaluation for lower back source for his discomfort.  He reports very positional pain, improvement with 45 degree angle in relieving of stress associated with his lower back.  We have discussed that lower back may be causing changes, referred discomfort within the pelvic floor.  At this time he does not have a primary care physician, he will attempt to identify new primary care and inquire about imaging related to the lower back.  IPSS today 6.  Denies obstructive symptoms.  He will continue to work with pelvic floor therapy, seek evaluation with primary care physician.  He will follow-up in 6 months for a symptom check.     Diagnoses and all orders for this visit:    1. Pelvic floor dysfunction (Primary)         Follow Up:   Return in about 6 months  (around 6/13/2023).     I spent approximately 30 minutes providing clinical care for this patient; including review of patient's chart and provider documentation, face to face time spent with patient in examination room (obtaining history, performing physical exam, discussing diagnosis and management options), placing orders, and completing patient documentation.     Jett Sharma MD  [facility]

## 2023-01-11 ENCOUNTER — TREATMENT (OUTPATIENT)
Dept: PHYSICAL THERAPY | Facility: CLINIC | Age: 53
End: 2023-01-11
Payer: COMMERCIAL

## 2023-01-11 DIAGNOSIS — M62.838 MUSCLE SPASM: ICD-10-CM

## 2023-01-11 DIAGNOSIS — M62.89 PELVIC FLOOR DYSFUNCTION: Primary | ICD-10-CM

## 2023-01-11 DIAGNOSIS — R10.2 PELVIC PAIN: ICD-10-CM

## 2023-01-11 PROCEDURE — 97140 MANUAL THERAPY 1/> REGIONS: CPT | Performed by: PHYSICAL THERAPIST

## 2023-01-11 PROCEDURE — 97110 THERAPEUTIC EXERCISES: CPT | Performed by: PHYSICAL THERAPIST

## 2023-01-11 PROCEDURE — 97530 THERAPEUTIC ACTIVITIES: CPT | Performed by: PHYSICAL THERAPIST

## 2023-01-11 NOTE — PROGRESS NOTES
Re-Assessment / Progress Note  PHYSICAL THERAPY    [address], [address]; [place] [address]      Patient: Jeffery Olivas   : 1970  Diagnosis/ICD-10 Code:  Pelvic floor dysfunction [M62.89]  Referring practitioner: Jett Sharma MD  Date of Initial Visit: Type: THERAPY  Noted: 2022  Today's Date: 2023  Patient seen for 14 sessions      Subjective:   Jeffery Olivas reports: things are the same. Yesterday was really bad. Struggled to get comfortable. Normally if leans off the area it is helpful but it didn't go away with getting pressure off the area -5/10. Pt reports mild improvement for about a day after DN last visit. Not sure if mental.     Subjective Questionnaire: NIH CPSI  Clinical Progress: unchanged  Home Program Compliance: Yes  Treatment has included: therapeutic exercise, neuromuscular re-education, manual therapy, therapeutic activity, dry needling and moist heat      Objective     See flowsheet for details of treatment following reassessment.     Assessment/Plan  Progress to physical therapy goals is slow.He reports improved pain for about 24 hours following DN and this was repeated today to assess response. Pt was provided information for seeing a specialist regarding possible pudendal nerve involvement.  He has met 1/3 short term goals and 0/5 long term goals. He will benefit from continued skilled physical therapy to address remaining impairments and functional limitations.   Progress toward previous goals: Partially Met    STG's: 4 weeks  • Patient will report > 25% reduction in overall pain to assist with driving  • Patient will be able to sit 30 minutes before needing to stop due to pain for improved function with errands  • Patient will be able to perform HEP with minimal verbal cues -met     LTG's: By discharge  • Patient will report a decrease in pain to < 2/10 to allow for return to work  • Patient will report ability to sit for 2 hours without increased pain for  improved function with travel  • Patient functional outcome measure test, NIH-CPSI improved score by >50 %  • Patient will be able to stand x 1 hour without increased pain to allow for return to work  • Patient will be independent with HEP      Recommendations: Continue as planned  Timeframe: 6 weeks  Prognosis to achieve goals: fair    PT Signature: Solo Tovar PT      Based upon review of the patient's progress and continued therapy plan, it is my medical opinion that Jeffery Olivas should continue physical therapy treatment at [facility] PHYS[facility] TH[facility]AP[facility]  [address] [place] [id number] [phone].    Signature: __________________________________    PHYSICIAN: Jett Sharma MD  NPI: [id number]                                        0945/1025  Manual Therapy:    23     mins  96660;  Therapeutic Exercise:    8     mins  72103;     Neuromuscular Stephanie:    0    mins  29458;    Therapeutic Activity:     8     mins  62993;     Gait Trainin     mins  14244;     Ultrasound:     0     mins  97601;    Electrical Stimulation:    0     mins  41793 ( );  Dry Needling     0     mins self-pay    Timed Treatment:   39   mins   Total Treatment:     39   mins

## 2023-01-16 ENCOUNTER — TREATMENT (OUTPATIENT)
Dept: PHYSICAL THERAPY | Facility: CLINIC | Age: 53
End: 2023-01-16
Payer: COMMERCIAL

## 2023-01-16 DIAGNOSIS — M62.89 PELVIC FLOOR DYSFUNCTION: Primary | ICD-10-CM

## 2023-01-16 DIAGNOSIS — M62.838 MUSCLE SPASM: ICD-10-CM

## 2023-01-16 DIAGNOSIS — R10.2 PELVIC PAIN: ICD-10-CM

## 2023-01-16 PROCEDURE — 97140 MANUAL THERAPY 1/> REGIONS: CPT | Performed by: PHYSICAL THERAPIST

## 2023-01-17 NOTE — PROGRESS NOTES
Physical Therapy Daily Treatment Note  PHYSICAL THERAPY    [address], [address]; [place] [address]  Patient: Jeffery Olivas   : 1970  Diagnosis/ICD-10 Code:  Pelvic floor dysfunction [M62.89]  Referring practitioner: Jett Sharma MD  Date of Initial Visit: Type: THERAPY  Noted: 2022  Today's Date: 2023  Patient seen for 15 sessions      Subjective   Jeffery Olivas reports he didn't really notice a change after last treatment. He had a really bad couple of days over the weekend. Pt reports some bowel issues and declines internal treatment today.   Pain Rating (0-10): 5      Objective   verbal consent obtained for external pelvic exam/treatment with declined need for second person in room     See Exercise, Manual, and Modality Logs for complete treatment.       Assessment/Plan  Pt with minimal change in symptoms following visit. STM performed in sidelying today due to prior positive response.  Patient is following up with PCP at the end of this month [facility] and is contacted will reach anteroverted to explore possible pudendal nerve issues.  We will continue manual and progress as tolerated to decrease symptoms    Progress per Plan of Care          0950/1020   Timed:         Manual Therapy:    30     mins  02749;     Therapeutic Exercise:    0     mins  67876;     Neuromuscular Stephanie:    0    mins  08345;    Therapeutic Activity:     0     mins  72899;     Gait Trainin     mins  88996;     Ultrasound:     0     mins  08712;    Ionto                               0    mins   65797  Self Care                       0     mins   57682  Canalith Repos               0    mins  67646    Un-Timed:  Electrical Stimulation:    0     mins  66815 ( );  Dry Needling     0     mins self-pay  Traction     0     mins 28520  Low Eval     0     Mins  43223  Mod Eval     0     Mins  28433  High Eval                       0     Mins  55711  Re-Eval                           0    mins   27081    Timed Treatment:   30   mins   Total Treatment:     30   mins    Solo Tovar, PT  [place] License # [id number]  Physical Therapist

## 2023-01-23 ENCOUNTER — TREATMENT (OUTPATIENT)
Dept: PHYSICAL THERAPY | Facility: CLINIC | Age: 53
End: 2023-01-23
Payer: COMMERCIAL

## 2023-01-23 DIAGNOSIS — M62.89 PELVIC FLOOR DYSFUNCTION: Primary | ICD-10-CM

## 2023-01-23 DIAGNOSIS — R10.2 PELVIC PAIN: ICD-10-CM

## 2023-01-23 DIAGNOSIS — M62.838 MUSCLE SPASM: ICD-10-CM

## 2023-01-23 PROCEDURE — 97140 MANUAL THERAPY 1/> REGIONS: CPT | Performed by: PHYSICAL THERAPIST

## 2023-01-24 NOTE — PROGRESS NOTES
Physical Therapy Daily Treatment Note  PHYSICAL THERAPY    [address], [address]; [place] [address]  Patient: Jeffery Olivas   : 1970  Diagnosis/ICD-10 Code:  Pelvic floor dysfunction [M62.89]  Referring practitioner: Jett Sharma MD  Date of Initial Visit: Type: THERAPY  Noted: 2022  Today's Date: 2023  Patient seen for 16 sessions      Subjective   Jeffery Olivas reports he thinks he may have had a little improvement the day after last treatment that may be lasted about 24 hours.  States he is seeing his primary care doctor on Friday to further investigate getting an MRI.  Pain Rating (0-10): 5      Objective   verbal consent obtained for internal pelvic exam/treatment with declined need for second person in room     See Exercise, Manual, and Modality Logs for complete treatment.       Assessment/Plan  Patient tolerated resuming internal manual therapy today with moderate discomfort.  Patient reports mild decrease in symptoms for 24 hours after last visit.  He is following up with primary care physician Friday to determine future course for addressing pain symptoms.  We will continue manual and progress as tolerated.      Progress per Plan of Care         0945/1030    Timed:         Manual Therapy:    38     mins  67458;     Therapeutic Exercise:    0     mins  51043;     Neuromuscular Stephanie:    0    mins  27967;    Therapeutic Activity:     0     mins  83470;     Gait Trainin     mins  33302;     Ultrasound:     0     mins  81439;    Ionto                               0    mins   94553  Self Care                       0     mins   77762  Canalith Repos               0    mins  11881    Un-Timed:  Electrical Stimulation:    0     mins  21346 ( );  Dry Needling     0     mins self-pay  Traction     0     mins 20736  Low Eval     0     Mins  38707  Mod Eval     0     Mins  16611  High Eval                       0     Mins  27627  Re-Eval                           0    mins   81344    Timed Treatment:   38   mins   Total Treatment:     38   mins    Solo Tovar, PT  [place] License # [id number]  Physical Therapist

## 2023-01-27 ENCOUNTER — PATIENT MESSAGE (OUTPATIENT)
Dept: INTERNAL MEDICINE | Facility: CLINIC | Age: 53
End: 2023-01-27

## 2023-01-27 ENCOUNTER — OFFICE VISIT (OUTPATIENT)
Dept: INTERNAL MEDICINE | Facility: CLINIC | Age: 53
End: 2023-01-27
Payer: COMMERCIAL

## 2023-01-27 VITALS
WEIGHT: 296 LBS | OXYGEN SATURATION: 96 % | BODY MASS INDEX: 37.99 KG/M2 | DIASTOLIC BLOOD PRESSURE: 88 MMHG | SYSTOLIC BLOOD PRESSURE: 130 MMHG | HEIGHT: 74 IN | TEMPERATURE: 97.9 F | HEART RATE: 104 BPM

## 2023-01-27 DIAGNOSIS — E78.2 MIXED HYPERLIPIDEMIA: ICD-10-CM

## 2023-01-27 DIAGNOSIS — R22.1 SENSATION OF LUMP IN THROAT: ICD-10-CM

## 2023-01-27 DIAGNOSIS — F32.2 SEVERE DEPRESSION: ICD-10-CM

## 2023-01-27 DIAGNOSIS — G47.33 OSA (OBSTRUCTIVE SLEEP APNEA): ICD-10-CM

## 2023-01-27 DIAGNOSIS — G89.29 CHRONIC PELVIC PAIN IN MALE: Primary | ICD-10-CM

## 2023-01-27 DIAGNOSIS — R36.1 HEMATOSPERMIA: ICD-10-CM

## 2023-01-27 DIAGNOSIS — G89.29 CHRONIC MIDLINE LOW BACK PAIN WITHOUT SCIATICA: ICD-10-CM

## 2023-01-27 DIAGNOSIS — E11.65 TYPE 2 DIABETES MELLITUS WITH HYPERGLYCEMIA, WITHOUT LONG-TERM CURRENT USE OF INSULIN: ICD-10-CM

## 2023-01-27 DIAGNOSIS — R10.2 CHRONIC PELVIC PAIN IN MALE: Primary | ICD-10-CM

## 2023-01-27 DIAGNOSIS — M54.50 CHRONIC MIDLINE LOW BACK PAIN WITHOUT SCIATICA: ICD-10-CM

## 2023-01-27 PROBLEM — R09.A2 SENSATION OF LUMP IN THROAT: Status: ACTIVE | Noted: 2023-01-27

## 2023-01-27 PROBLEM — Z12.5 PROSTATE CANCER SCREENING: Status: RESOLVED | Noted: 2022-03-29 | Resolved: 2023-01-27

## 2023-01-27 PROCEDURE — 99215 OFFICE O/P EST HI 40 MIN: CPT | Performed by: INTERNAL MEDICINE

## 2023-01-27 RX ORDER — DULOXETIN HYDROCHLORIDE 30 MG/1
30 CAPSULE, DELAYED RELEASE ORAL DAILY
Qty: 30 CAPSULE | Refills: 1 | Status: SHIPPED | OUTPATIENT
Start: 2023-01-27 | End: 2023-02-24 | Stop reason: SDUPTHER

## 2023-01-27 NOTE — TELEPHONE ENCOUNTER
From: Jeffery Olivas  To: Tonia Webb  Sent: 1/27/2023 4:20 PM EST  Subject: Referral    Hi Dr. Webb,   Thanks so much for seeing me today. I appreciate your patience and kindness in listening and helping me through this ordeal. I mentioned PT Solo Tovar had recommended a nerve treatment facility in [facility] for possible nerve issues. It is:  [place] [phone]   If you think this is a viable option can we move forward with a referral? Thanks so much.  Jeffery Wellington

## 2023-01-27 NOTE — PROGRESS NOTES
Internal Medicine New Patient  Jeffery Olivas is a 53 y.o. male who presents today to establish care and with concerns as outlined below.    Chief Complaint  Chief Complaint   Patient presents with   • Pelvic Pain   • Establish Care        HPI  Mr. Olivas comes in today to transition care. He notes 5/2021 lifting boxes of heavy books. He felt immediate pain in his rectum, perineum, L testicle, groin. He was found to have inguinal and umbilical hernias which were repaired 10/2021 resolving the groin pain. He still has perineal, rectal, and testicular pain with prolonged sitting, standing. He has hematospermia. He has been to urology. First Dr. Nunez who diagnosed him with prostatitis. He reports significant pain with prostate exam. He was treated with abx, antiinflammatories, and testosterone. He is not sure if testosterone level has been abnormal. He transitioned care to Dr. Sharma 3/2022. He has been diagnosed with pelvic floor dysfunction and has been in pelvic floor PT and tried robaxin. He reports little to no improvement. At his last visit 12/2022 Dr. Sharma recommended evaluation of his low back and pelvis. PT has also mentioned pudendal nerve as being a possible etiology of his pain. He does reports low back pain around the tailbone. He is interested in doing anything that can help alleviate his pain. He is very tearful. He describes his entire life being upturned by his current pain. No longer able to work. Was a manager at [employer]. Intimacy has changed. He cannot help his children move. He cannot be in the car for long periods and cannot sit without leaning at a 45 degree angle. He denies SI but has passive thoughts of death. No access to weapons in the home. He does not discuss this with his family because he feels like a burden. He would like to see a counselor.       Review of Systems  Review of Systems   Constitutional: Positive for activity change and fatigue. Negative for fever.   Respiratory:  Negative.    Cardiovascular: Negative.    Gastrointestinal: Negative.    Genitourinary: Positive for testicular pain. Negative for decreased urine volume, difficulty urinating, discharge and hematuria.        +pelvic pain, hematospermia   Musculoskeletal: Positive for arthralgias, back pain, gait problem (cannot stand for long periods) and myalgias.   Skin: Negative.    Psychiatric/Behavioral: Positive for suicidal ideas (passive), depressed mood and stress. Negative for self-injury.        Past Medical History  Past Medical History:   Diagnosis Date   • Allergic 1983   • Benign prostatic hyperplasia 2021    Ongoing   • Depression 2021   • Wears glasses         Surgical History  Past Surgical History:   Procedure Laterality Date   • ACHILLES TENDON SURGERY  2009   • APPENDECTOMY     • INGUINAL HERNIA REPAIR Bilateral 10/11/2021    Procedure: ROBOT ASSISTED LAPAROSCOPIC BILATERAL INGUINAL HERNIA REPAIR;  Surgeon: Alcides Chaudhary MD;  Location:  [facility] OR;  Service: [facility];  Laterality: Bilateral;   • INGUINAL HERNIA REPAIR  2021   • KNEE MENISCAL REPAIR Bilateral    • NASAL SEPTUM SURGERY     • SEPTOPLASTY  2005   • UMBILICAL HERNIA REPAIR N/A 10/11/2021    Procedure: OPEN UMBILICAL HERNIA REPAIR;  Surgeon: Alcides Chaudhary MD;  Location:  [facility];  Service: General;  Laterality: N/A;   • VASECTOMY  2012        Family History  Family History   Problem Relation Age of Onset   • Hiatal hernia Maternal Grandfather         Social History  Social History     Socioeconomic History   • Marital status:    Tobacco Use   • Smoking status: Never   • Smokeless tobacco: Never   Substance and Sexual Activity   • Alcohol use: Yes     Alcohol/week: 4.0 standard drinks     Types: 2 Glasses of wine, 2 Cans of beer per week     Comment: occ   • Drug use: Never   • Sexual activity: Yes     Partners: Female     Birth control/protection: Surgical     Comment:         Current Medications  Current Outpatient  "Medications on File Prior to Visit   Medication Sig Dispense Refill   • Multiple Vitamins-Minerals (MULTIVITAMIN ADULT EXTRA C PO) multivitamin     • omeprazole (priLOSEC) 40 MG capsule Take 40 mg by mouth Daily.       No current facility-administered medications on file prior to visit.       Allergies  Allergies   Allergen Reactions   • Codeine GI Intolerance        Objective  Visit Vitals  /88   Pulse 104   Temp 97.9 °F (36.6 °C)   Ht 188 cm (74\")   Wt 134 kg (296 lb)   SpO2 96%   BMI 38.00 kg/m²        Physical Exam  Physical Exam  Vitals and nursing note reviewed.   Constitutional:       General: He is not in acute distress.     Appearance: He is well-developed. He is obese. He is not diaphoretic.      Comments: Sitting in chair, leaning at 45 degree angle. Tearful, emotionally upset and physically uncomfortable.   HENT:      Head: Normocephalic and atraumatic.      Right Ear: External ear normal.      Left Ear: External ear normal.      Nose: Nose normal.   Eyes:      Conjunctiva/sclera: Conjunctivae normal.   Pulmonary:      Effort: Pulmonary effort is normal. No respiratory distress.   Abdominal:      General: There is distension (obese).      Palpations: Abdomen is soft.      Tenderness: There is no abdominal tenderness.   Musculoskeletal:         General: Tenderness (very low back) present. No deformity.      Cervical back: Neck supple.   Lymphadenopathy:      Cervical: No cervical adenopathy.   Skin:     General: Skin is warm and dry.      Findings: No rash.   Neurological:      General: No focal deficit present.      Mental Status: He is alert and oriented to person, place, and time.   Psychiatric:         Mood and Affect: Mood is depressed. Affect is tearful.         Behavior: Behavior normal.         Thought Content: Thought content normal.         Judgment: Judgment normal.          Results  Results for orders placed or performed in visit on 04/29/22   POC Glycosylated Hemoglobin (Hb A1C)    " Specimen: Blood   Result Value Ref Range    Hemoglobin A1C 8.0 %    Lot Number [id number]     Expiration Date 1/17/24         Assessment and Plan  Diagnoses and all orders for this visit:    Chronic pelvic pain in male  - 2 years of pelvic pain affecting every aspect of his life  - no improvement with pelvic floor PT, treatment for prostatitis with rounds of abx and antiinflammatories, surgical repair of inguinal and umbilical hernias  - CT abd/pelvis w and wo contrast previously negative  - will obtain MRI of lumbar spine and pelvis to evaluate for possible radicular pain  - referral to [facility] for management of potential pudendal nerve entrapment or neuralgia  - start cymbalta for both mood and chronic pain    Chronic midline low back pain without sciatica  - MRI ordered as noted above    Severe depression (HCC)  - due to chronic pain x2y  - he has passive SI, no active thoughts of self harm.   - he does not discuss this with family so limited support system  - will start on cymbalta 30mg daily  - referring for counseling  - follow up in 2 weeks    Type 2 diabetes mellitus with hyperglycemia, without long-term current use of insulin (HCC)  - has had A1c up to 8 4/2022  - this has not been well managed up until now  - declines A1c today    PEREZ (obstructive sleep apnea)  - discuss next visit    Hematospermia  - has been evaluated by urology with cystourethroscopy    Mixed hyperlipidemia  - would be indicated for statin but this has not been discussed due to severe pelvic pain    Sensation of lump in throat  - following with ENT, on treatment for GERD    Health Maintenance   Topic Date Due   • COLORECTAL CANCER SCREENING  Never done   • HEPATITIS C SCREENING  Never done   • ANNUAL PHYSICAL  Never done   • COVID-19 Vaccine (3 - Booster for Radha series) 01/01/2022   • INFLUENZA VACCINE  08/01/2022   • TDAP/TD VACCINES (2 - Td or Tdap) 09/22/2030   • ZOSTER VACCINE  Completed   • Pneumococcal  Vaccine 0-64  Aged Out       Return in about 2 weeks (around 2/10/2023) for Follow up 30 minutes.     Today I have spent a total of 54 minutes caring for Jeffery Olivas.  This includes time spent preparing for the visit, reviewing tests, performing a medically appropriate examination and/or evaluation , counseling and educating the patient/family/caregiver, ordering medications, tests, or procedures and documenting information in the medical record.

## 2023-01-31 ENCOUNTER — TELEPHONE (OUTPATIENT)
Dept: INTERNAL MEDICINE | Facility: CLINIC | Age: 53
End: 2023-01-31

## 2023-01-31 NOTE — TELEPHONE ENCOUNTER
Caller: Jeffery Olivas    Relationship to patient: Self    Best call back number: [phone]    Chief complaint: 2 WEEK FOLLOW UP     Type of visit: OFFICE VISIT     Requested date: 02/10/23 OR THE FOLLOWING WEEK     If rescheduling, when is the original appointment: N/A     Additional notes: PATIENT WAS SEEN ON 01/27/2023. PATIENT WAS SUPPOSED TO FOLLOW UP IN 2 WEEKS BUT DR HILARIO CLOSES APPOINTMENT TO THAT IS 02/07/23 AND PATIENT WANTED TO KNOW WHAT DR HILARIO RECOMMENDED HE DO TO GET IN FOR THAT 2 WEEK WINDOW

## 2023-02-01 ENCOUNTER — TREATMENT (OUTPATIENT)
Dept: PHYSICAL THERAPY | Facility: CLINIC | Age: 53
End: 2023-02-01
Payer: COMMERCIAL

## 2023-02-01 DIAGNOSIS — R10.2 PELVIC PAIN: ICD-10-CM

## 2023-02-01 DIAGNOSIS — M62.838 MUSCLE SPASM: ICD-10-CM

## 2023-02-01 DIAGNOSIS — M62.89 PELVIC FLOOR DYSFUNCTION: Primary | ICD-10-CM

## 2023-02-01 PROCEDURE — 20561 NDL INSJ W/O NJX 3+ MUSC: CPT | Performed by: PHYSICAL THERAPIST

## 2023-02-01 PROCEDURE — 97140 MANUAL THERAPY 1/> REGIONS: CPT | Performed by: PHYSICAL THERAPIST

## 2023-02-01 NOTE — PROGRESS NOTES
Physical Therapy Daily Treatment Note  PHYSICAL THERAPY    [address] [facility], [address]; [place] [address]  Patient: Jeffery Olivas   : 1970  Diagnosis/ICD-10 Code:  Pelvic floor dysfunction [M62.89]  Referring practitioner: Jett Sharma MD  Date of Initial Visit: Type: THERAPY  Noted: 2022  Today's Date: 2/3/2023  Patient seen for 17 sessions      Subjective   Jeffery Olivas reports saw new PCP who ordered MRI for lumbar and pelvis; put in an order for [facility]. Wants to get a referral for colorectal to make sure nothing go on there. Put in a referral for mental health counseling. Thinks that the rest of the day no change but for the next couple of days feels things were better 5-10%. 2-3 days where he could quickly get himself out of pain.     Pain Rating (0-10): 5      Objective   verbal consent obtained for internal pelvic exam/treatment with declined need for second person in room     See Exercise, Manual, and Modality Logs for complete treatment.       Assessment/Plan  Patient with mild improvement of pain with dry needling and improvement with ability to manage pain symptoms.  He tolerated manual and dry needling with mild to moderate discomfort.  We will continue manual and needling as indicated to reduce pain symptoms as patient explores imaging for pelvic pain.    Progress per Plan of Care         0945/1030   Timed:         Manual Therapy:    30     mins  44838;     Therapeutic Exercise:    0     mins  03212;     Neuromuscular Stephanie:    0    mins  46917;    Therapeutic Activity:     0     mins  54350;     Gait Trainin     mins  63954;     Ultrasound:     0     mins  58071;    Ionto                               0    mins   87715  Self Care                       0     mins   73417  Canalith Repos               0    mins  10820    Un-Timed:  Electrical Stimulation:    0     mins  97961 ( );  Dry Needling     10     mins self-pay  Traction     0     mins 25293  Low  Eval     0     Mins  31358  Mod Eval     0     Mins  66179  High Eval                       0     Mins  85550  Re-Eval                           0    mins  38487    Timed Treatment:   30   mins   Total Treatment:     40   mins    Solo Tovar PT  [place] License # [id number]  Physical Therapist

## 2023-02-02 ENCOUNTER — OFFICE VISIT (OUTPATIENT)
Dept: BEHAVIORAL HEALTH | Facility: CLINIC | Age: 53
End: 2023-02-02
Payer: COMMERCIAL

## 2023-02-02 DIAGNOSIS — F41.1 GENERALIZED ANXIETY DISORDER: Primary | ICD-10-CM

## 2023-02-02 DIAGNOSIS — F32.1 CURRENT MODERATE EPISODE OF MAJOR DEPRESSIVE DISORDER, UNSPECIFIED WHETHER RECURRENT: ICD-10-CM

## 2023-02-02 PROCEDURE — 90791 PSYCH DIAGNOSTIC EVALUATION: CPT | Performed by: SOCIAL WORKER

## 2023-02-02 NOTE — PROGRESS NOTES
[facility] [place]                  Initial Assessment      Initial Adult Note     Date:2023   Patient Name: Jeffery Olivas  : 1970   MRN: [id number]   Time IN: 10:57 am    Time OUT: 11:40 am    Referring Provider: Tonia Webb MD    Chief Complaint:      ICD-10-CM ICD-9-CM   1. Generalized anxiety disorder  F41.1 300.02   2. Current moderate episode of major depressive disorder, unspecified whether recurrent (HCC)  F32.1 296.22        History of Present Illness:   Jeffery Olivas is a 53 y.o. male who is being seen today for counseling for symptoms of anxiety and depression      Past Psychiatric History:   None reported    Subjective      Review of Systems    PHQ-9: Brief Depression Severity Measure Score: 11         Patient's Support Network Includes:  extended family    Functional Status: Moderate impairment     Significant Life Events:   Verbal, physical, sexual abuse? No  Has patient experienced a death / loss of relationship? No  Has patient experienced a major accident or tragic events? No    Work History:   Highest level of education obtained: college  Ever been active duty in the ? No  Patient's Occupation: Patient is currently disabled due to severe injury to back and lower abdomen area.    Interpersonal/Relational:  Marital Status:   Support system: extended family    Mental/Behavioral Health History:  History of prior treatment or hospitalization: None reported.  Past diagnoses: None reported.  Are there any significant health issues (current or past): None reported.  History of seizures: No    Family Psychiatric History:  None reported.    History of Substance Use:  Patient answered: No    Triggers: (Persons/Places/Things/Events/Thought/Emotions): Fears of worsening physical health caused by chronic health conditions.    Social History     Socioeconomic History   • Marital status:    Tobacco Use   • Smoking status:  Never   • Smokeless tobacco: Never   Vaping Use   • Vaping Use: Never used   Substance and Sexual Activity   • Alcohol use: Yes     Alcohol/week: 4.0 standard drinks     Types: 2 Glasses of wine, 2 Cans of beer per week     Comment: occ   • Drug use: Never   • Sexual activity: Yes     Partners: Female     Birth control/protection: Surgical     Comment:         Past Medical History:   Diagnosis Date   • Allergic 1983   • Benign prostatic hyperplasia 2021    Ongoing   • Chronic prostatitis    • Chronic prostatitis    • Depression 2021   • Prostate cancer screening 3/29/2022   • Type 2 diabetes mellitus with hyperglycemia, without long-term current use of insulin (HCC) 1/27/2023   • Wears glasses        Past Surgical History:   Procedure Laterality Date   • ACHILLES TENDON SURGERY  2009    2 surgeries   • APPENDECTOMY  2002   • INGUINAL HERNIA REPAIR Bilateral 10/11/2021    Procedure: ROBOT ASSISTED LAPAROSCOPIC BILATERAL INGUINAL HERNIA REPAIR;  Surgeon: Alcides Chaudhary MD;  Location:  [id number];  Service: Robotics - DaVinci;  Laterality: Bilateral;   • KNEE MENISCAL REPAIR Bilateral     2015 and 2017   • SEPTOPLASTY  2005   • UMBILICAL HERNIA REPAIR N/A 10/11/2021    Procedure: OPEN UMBILICAL HERNIA REPAIR;  Surgeon: Alcides Chaudhary MD;  Location:  [id number];  Service: General;  Laterality: N/A;   • VASECTOMY  2012       Family History   Problem Relation Age of Onset   • No Known Problems Mother    • No Known Problems Father    • No Known Problems Brother    • No Known Problems Maternal Grandmother    • Hiatal hernia Maternal Grandfather    • No Known Problems Paternal Grandmother    • No Known Problems Paternal Grandfather          Current Outpatient Medications:   •  DULoxetine (CYMBALTA) 30 MG capsule, Take 1 capsule by mouth Daily., Disp: 30 capsule, Rfl: 1  •  Multiple Vitamins-Minerals (MULTIVITAMIN ADULT EXTRA C PO), multivitamin, Disp: , Rfl:   •  omeprazole (priLOSEC) 40 MG capsule, Take 40 mg by  mouth Daily., Disp: , Rfl:     Allergies   Allergen Reactions   • Codeine GI Intolerance     As a young child       Objective     Physical Exam:  Vital Signs: There were no vitals filed for this visit.  There is no height or weight on file to calculate BMI.     Physical Exam    Mental Status Exam:   Hygiene:   good  Cooperation:  Cooperative  Eye Contact:  Good  Psychomotor Behavior:  Appropriate  Affect:  Full range  Mood: sad  Speech:  Normal  Thought Process:  Goal directed  Thought Content:  Normal  Suicidal:  Suicidal Ideation  Homicidal:  None  Hallucinations:  None  Delusion:  None  Memory:  Intact  Orientation:  Grossly intact  Reliability:  good  Insight:  Good  Judgement:  Good  Impulse Control:  Good  Physical/Medical Issues:  Yes Severe injury to lower abdomen and surrounding areas sustained in April 2021.     SUICIDE RISK ASSESSMENT/CSSRS:  1. Does patient have thoughts of suicide? yes  2. Does patient have intent for suicide? no  3. Does patient have a current plan for suicide? no  4. History of suicide attempts: no  5. Family history of suicide or attempts: no  6. History of violent behaviors towards others or property or thoughts of committing suicide: no  7. History of sexual aggression toward others: no  8. Access to firearms or weapons: no    Assessment / Plan      Diagnosis  Diagnoses and all orders for this visit:    1. Generalized anxiety disorder (Primary)    2. Current moderate episode of major depressive disorder, unspecified whether recurrent (HCC)    Patient presented for intake session with clinician.  Patient reports sustaining a severe injury to the lower abdomen and surrounding areas in April 2021 after lifting boxes that were to have a.  Patient reports the resulting anxiety and depression as being the primary reason they are seeking behavioral health treatment.  Patient reports having a triple hernia.  Patient reports having to wait 6 months before surgery to repair the hernia.   Patient reports several conditions resulting from the injury that are still present including decreased ejaculate upon climaxing often mixed with blood.  Constant pain in the groin area and chronic pain unless the patient is lying in a vertical position.  Patient reports great distress that their intimate life has been affected by their chronic health condition.  Patient reports distress resulting from their inability to work and provide for their family.  Patient reports severe frustration and the feeling that there health condition will not get better but only worsen as a result of moving from provider to provider with little or no satisfactory results.  Patient reports low self image and self-esteem as a result of their chronic condition.  Depression symptoms to note are as follows: Anhedonia, chronic hopelessness, sleeplessness due to waking often from pain, fatigue, low self-esteem and self-image, and passive SI in the form of thinking that they would be free of the pain and less of a burden to loved ones if they were no longer here.  Risk assessment found the patient to be at little or no risk for self-harm or SI.  Anxiety symptoms to note are as follows: Nervousness, chronic worry, worrying about different things, trouble relaxing, restlessness, slight irritation and catastrophization.    Prognosis  Intake session completed.  Progress towards patient goals of better regulating mood and symptoms of anxiety and depression will be noted in future progress notes.    Progress toward goal: Intake session completed.  Progress towards patient goals of better regulating mood and symptoms of anxiety and depression will be noted in future progress notes.      PLAN:  Clinician introduced a cognitive behavioral intervention which focuses on mindfulness and confronting maladaptive thought patterns through the process of Socratic questioning.  Patient and clinician will meet for follow-up sessions every 14 days that will  last from 40 to 60 minutes in duration.    1. Safety: Patient reports periods of passive SI.  Patient denies plan or intent.  Patient identified strong family and community supports.  2. Risk Assessment: Risk of self-harm acutely is low. Risk of self-harm chronically is also low, but could be further elevated in the event of treatment noncompliance and/or AODA.    Treatment Plan/Goals: Continue supportive psychotherapy efforts and medications as indicated. Treatment and medication options discussed during today's visit. Patient ackowledged and verbally consented to continue with current treatment plan and was educated on the importance of compliance with treatment and follow-up appointments. Patient seems reasonably able to adhere to treatment plan.      Assisted Patient in processing above session content; acknowledged and normalized patient’s thoughts, feelings, and concerns.  Rationalized patient thought process regarding symptoms of anxiety..      Allowed Patient to freely discuss issues  without interruption or judgement with unconditional positive regard, active listening skills, and empathy. Therapist provided a safe, confidential environment to facilitate the development of a positive therapeutic relationship and encouraged open, honest communication. Assisted Patient in identifying risk factors which would indicate the need for higher level of care including thoughts to harm self or others and/or self-harming behavior and encouraged Patient to contact this office, call 911, or present to the nearest emergency room should any of these events occur. Discussed crisis intervention services and means to access. Patient adamantly and convincingly denies current suicidal or homicidal ideation or perceptual disturbance. Assisted Patient in processing session content; acknowledged and normalized Patient’s thoughts, feelings, and concerns by utilizing a person-centered approach in efforts to build appropriate rapport  and a positive therapeutic relationship with open and honest communication.     Follow Up:   Return in about 2 weeks (around 2/16/2023).    Dionisio Luciano LCSW

## 2023-02-03 NOTE — TELEPHONE ENCOUNTER
Spoke with patient and he is okay coming in at that time. Patient scheduled for 2/9/2023 at 2:30 pm.

## 2023-02-06 ENCOUNTER — TREATMENT (OUTPATIENT)
Dept: PHYSICAL THERAPY | Facility: CLINIC | Age: 53
End: 2023-02-06

## 2023-02-06 DIAGNOSIS — R10.2 PELVIC PAIN: ICD-10-CM

## 2023-02-06 DIAGNOSIS — M62.838 MUSCLE SPASM: ICD-10-CM

## 2023-02-06 DIAGNOSIS — M62.89 PELVIC FLOOR DYSFUNCTION: Primary | ICD-10-CM

## 2023-02-06 PROCEDURE — 20561 NDL INSJ W/O NJX 3+ MUSC: CPT | Performed by: PHYSICAL THERAPIST

## 2023-02-06 NOTE — PROGRESS NOTES
Physical Therapy Daily Treatment Note  PHYSICAL THERAPY    [address] [facility] [address] [facility], [address]; [place] [address]  Patient: Jeffery Olivas   : 1970  Diagnosis/ICD-10 Code:  Pelvic floor dysfunction [M62.89]  Referring practitioner: Jett Sharma MD  Date of Initial Visit: Type: THERAPY  Noted: 2022  Today's Date: 2023  Patient seen for 18 sessions      Subjective   Jeffery Olivas reports pain 5-6/10. Thinks he notices that in 2-3 days following he has more instances where he can get himself out more easily and quickly like it was before. Taking Cymbalta but feels not doing anything.     Pain Rating (0-10): 6      Objective   verbal consent obtained for external pelvic exam/treatment with declined need for second person in room     See Exercise, Manual, and Modality Logs for complete treatment.       Assessment/Plan  Patient with mild decrease in symptoms for 2 to 3 days following treatment with ability to better decrease symptoms when occurring.  Implemented dry needling only today to determine benefit along versus with internal manual therapy to pelvic floor muscles.    Progress per Plan of Care         0950/1020   Timed:         Manual Therapy:    0     mins  71208;     Therapeutic Exercise:    0     mins  20929;     Neuromuscular Stephanie:    0    mins  98181;    Therapeutic Activity:     0     mins  67594;     Gait Trainin     mins  11431;     Ultrasound:     0     mins  11531;    Ionto                               0    mins   30728  Self Care                       0     mins   32274  Canalith Repos               0    mins  46369    Un-Timed:  Electrical Stimulation:    0     mins  17411 ( );  Dry Needling     30     mins self-pay  Traction     0     mins 07761  Low Eval     0     Mins  03434  Mod Eval     0     Mins  84231  High Eval                       0     Mins  51681  Re-Eval                           0    mins  68104    Timed Treatment:      mins   Total Treatment:     30    christopher Tovar, PT  [place] License # [id number]  Physical Therapist

## 2023-02-09 ENCOUNTER — OFFICE VISIT (OUTPATIENT)
Dept: INTERNAL MEDICINE | Facility: CLINIC | Age: 53
End: 2023-02-09
Payer: COMMERCIAL

## 2023-02-09 VITALS
WEIGHT: 296 LBS | HEART RATE: 114 BPM | BODY MASS INDEX: 37.99 KG/M2 | SYSTOLIC BLOOD PRESSURE: 122 MMHG | TEMPERATURE: 96.6 F | HEIGHT: 74 IN | DIASTOLIC BLOOD PRESSURE: 86 MMHG | OXYGEN SATURATION: 95 %

## 2023-02-09 DIAGNOSIS — F32.2 SEVERE DEPRESSION: Primary | ICD-10-CM

## 2023-02-09 DIAGNOSIS — R10.2 CHRONIC PELVIC PAIN IN MALE: ICD-10-CM

## 2023-02-09 DIAGNOSIS — Z23 NEED FOR HEPATITIS B VACCINATION: ICD-10-CM

## 2023-02-09 DIAGNOSIS — Z23 NEED FOR INFLUENZA VACCINATION: ICD-10-CM

## 2023-02-09 DIAGNOSIS — E11.65 TYPE 2 DIABETES MELLITUS WITH HYPERGLYCEMIA, WITHOUT LONG-TERM CURRENT USE OF INSULIN: ICD-10-CM

## 2023-02-09 DIAGNOSIS — G89.29 CHRONIC PELVIC PAIN IN MALE: ICD-10-CM

## 2023-02-09 PROCEDURE — 99214 OFFICE O/P EST MOD 30 MIN: CPT | Performed by: INTERNAL MEDICINE

## 2023-02-09 PROCEDURE — 90472 IMMUNIZATION ADMIN EACH ADD: CPT | Performed by: INTERNAL MEDICINE

## 2023-02-09 PROCEDURE — 90746 HEPB VACCINE 3 DOSE ADULT IM: CPT | Performed by: INTERNAL MEDICINE

## 2023-02-09 PROCEDURE — 90686 IIV4 VACC NO PRSV 0.5 ML IM: CPT | Performed by: INTERNAL MEDICINE

## 2023-02-09 PROCEDURE — 90471 IMMUNIZATION ADMIN: CPT | Performed by: INTERNAL MEDICINE

## 2023-02-09 NOTE — PROGRESS NOTES
Internal Medicine Follow Up    Chief Complaint  Jeffery Olivas is a 53 y.o. male who presents today for follow up of chronic medical conditions outlined below.    Chief Complaint   Patient presents with   • Follow-up     2 week         HPI  Mr. Olivas comes in today for close follow up on mood and pelvic pain. He reports today is a bad day. He notes that he will have a bad day about once a week. He cannot sit or stand without pain but is able to get some relief by laying flat and he requests to lay on the exam table today for the duration of his visit. He is seeing Dionisio every other week and finds this very beneficial. He has not seen any harm or benefit from cymbalta. He is not eager to increase the dose yet. He will establish at [facility] next week and has MRI scans at beginning of March. He reviews his mychart on his phone and notes several care gaps as well as his diagnosis of diabetes. He would like Hep B vaccination and flu shot today. He is not ready to have A1c or labs drawn. He reports that he knows his blood sugar and cholesterol will be bad and he cannot handle any more bad news.       Review of Systems  Review of Systems   Constitutional: Positive for activity change and fatigue.   Endocrine: Negative for polydipsia, polyphagia and polyuria.   Genitourinary: Positive for testicular pain. Negative for decreased urine volume.   Musculoskeletal: Positive for arthralgias and back pain (pelvic pain).   Psychiatric/Behavioral: Positive for depressed mood and stress.        Current Medications  Current Outpatient Medications on File Prior to Visit   Medication Sig Dispense Refill   • DULoxetine (CYMBALTA) 30 MG capsule Take 1 capsule by mouth Daily. 30 capsule 1   • Multiple Vitamins-Minerals (MULTIVITAMIN ADULT EXTRA C PO) multivitamin     • omeprazole (priLOSEC) 40 MG capsule Take 40 mg by mouth Daily.       No current facility-administered medications on file prior to visit.       Allergies  Allergies  "  Allergen Reactions   • Codeine GI Intolerance     As a young child       Objective  Visit Vitals  /86   Pulse 114   Temp 96.6 °F (35.9 °C)   Ht 188 cm (74\")   Wt 134 kg (296 lb)   SpO2 95%   BMI 38.00 kg/m²        Physical Exam  Physical Exam  Vitals and nursing note reviewed.   Constitutional:       General: He is in acute distress (in pain, tearful).      Appearance: He is well-developed.      Comments: Laying on his back on exam table   HENT:      Head: Normocephalic and atraumatic.   Eyes:      Conjunctiva/sclera: Conjunctivae normal.   Pulmonary:      Effort: Pulmonary effort is normal. No respiratory distress.   Skin:     General: Skin is warm and dry.   Neurological:      Mental Status: He is alert and oriented to person, place, and time.   Psychiatric:         Mood and Affect: Mood is depressed. Affect is tearful.         Behavior: Behavior normal. Behavior is cooperative.         Results  Results for orders placed or performed in visit on 04/29/22   POC Glycosylated Hemoglobin (Hb A1C)    Specimen: Blood   Result Value Ref Range    Hemoglobin A1C 8.0 %    Lot Number [id number]     Expiration Date 1/17/24         Assessment and Plan  Diagnoses and all orders for this visit:    Severe depression (HCC)  - due to chronic pain x2y  - he has passive SI, no active thoughts of self harm.   - he does not discuss this with family so limited support system  - cymbalta 30mg daily so far ineffective but defers change in dose today  - benefiting from counseling  - will see him back again in 2 weeks    Type 2 diabetes mellitus with hyperglycemia, without long-term current use of insulin (HCC)  - has had A1c up to 8 4/2022  - this has not been well managed up until now  - declines A1c today    Chronic pelvic pain in male  - 2 years of pelvic pain affecting every aspect of his life  - no improvement with pelvic floor PT, treatment for prostatitis with rounds of abx and antiinflammatories, surgical repair of inguinal " and umbilical hernias  - CT abd/pelvis w and wo contrast previously negative  - has upcoming MRI of lumbar spine and pelvis to evaluate for possible radicular pain  - next week will start seeing [facility] for management of potential pudendal nerve entrapment or neuralgia  - so far cymbalta ineffective for pain control, defers increase in dose today.     Health Maintenance  - Colonoscopy: defers  - HCV: defers  - Immunizations: HBV #1 and flu today. COVID discussed. PCV20 discussed.  - Depression screening: PHQ9 = 11 2/2023    Return in about 15 days (around 2/24/2023) for follow up at 4:15.  Answers for HPI/ROS submitted by the patient on 2/3/2023  What is the primary reason for your visit?: Other  Please describe your symptoms.: Follow-up  Have you had these symptoms before?: Yes  How long have you been having these symptoms?: Greater than 2 weeks  Please list any medications you are currently taking for this condition.: Please see My Chart  Please describe any probable cause for these symptoms. : Please see My Chart

## 2023-02-13 ENCOUNTER — TREATMENT (OUTPATIENT)
Dept: PHYSICAL THERAPY | Facility: CLINIC | Age: 53
End: 2023-02-13
Payer: COMMERCIAL

## 2023-02-13 DIAGNOSIS — M62.838 MUSCLE SPASM: ICD-10-CM

## 2023-02-13 DIAGNOSIS — R10.2 PELVIC PAIN: ICD-10-CM

## 2023-02-13 DIAGNOSIS — M62.89 PELVIC FLOOR DYSFUNCTION: Primary | ICD-10-CM

## 2023-02-13 PROCEDURE — 20561 NDL INSJ W/O NJX 3+ MUSC: CPT | Performed by: PHYSICAL THERAPIST

## 2023-02-15 NOTE — PROGRESS NOTES
Physical Therapy Daily Progress Note        Patient: Jeffery Olivas   : 1970  Diagnosis/ICD-10 Code:  Pelvic floor dysfunction [M62.89]  Referring practitioner: Jett Sharma MD  Date of Initial Visit: Type: THERAPY  Noted: 2022  Today's Date: 2/15/2023  Patient seen for 19 sessions         Patient reports: going to [facility] this Friday. Still taking Cymbalta and waiting to see if starts working. After last treatment still had a few days where could get out of pain. pain level is 6/10 upon arrival.          Objective   See Exercise, Manual, and Modality Logs for complete treatment.       Assessment/Plan  Patient tolerates DN well. He has 2-3 days of improvement following treatment where he can get out of pain more easily/quickly with changing positions.     Continue DN on a self pay basis.              Timed:  Manual Therapy:    0     mins  40743;  Therapeutic Exercise:    0     mins  71506;     Neuromuscular Stephanie:    0    mins  93931;    Therapeutic Activity:     0     mins  19663;     Gait Trainin     mins  70031;     Ultrasound:     0     mins  18862;    Electrical Stimulation:    0     mins  48639 ( );    Untimed:  Electrical Stimulation:    0     mins  76131 ( );  Mechanical Traction:    0     mins  77836;     Timed Treatment:   0   mins   Total Treatment:     30   mins  SELF PAY DN      DEEPAK Kong License: [id number]

## 2023-02-16 ENCOUNTER — OFFICE VISIT (OUTPATIENT)
Dept: BEHAVIORAL HEALTH | Facility: CLINIC | Age: 53
End: 2023-02-16
Payer: COMMERCIAL

## 2023-02-16 DIAGNOSIS — F33.1 MODERATE EPISODE OF RECURRENT MAJOR DEPRESSIVE DISORDER: ICD-10-CM

## 2023-02-16 DIAGNOSIS — F41.1 GENERALIZED ANXIETY DISORDER: Primary | ICD-10-CM

## 2023-02-16 PROCEDURE — 90834 PSYTX W PT 45 MINUTES: CPT | Performed by: SOCIAL WORKER

## 2023-02-16 NOTE — PROGRESS NOTES
[facility] [place]                 Follow Up Adult      Follow Up Adult Note     Date:2023   Patient Name: Jeffery Olivas  : 1970   MRN: [id number]   Time IN: 2:43 pm  Time OUT: 3:26 pm    Referring Provider: Tonia Webb MD    Chief Complaint:      ICD-10-CM ICD-9-CM   1. Generalized anxiety disorder  F41.1 300.02   2. Moderate episode of recurrent major depressive disorder (HCC)  F33.1 296.32        History of Present Illness:   Jeffery Olivas is a 53 y.o. male who is being seen today for follow up counseling for anxiety and depression.    Subjective               Patient's Support Network Includes: extended family    Functional Status: Severe impairment      Current Outpatient Medications:   •  DULoxetine (CYMBALTA) 30 MG capsule, Take 1 capsule by mouth Daily., Disp: 30 capsule, Rfl: 1  •  Multiple Vitamins-Minerals (MULTIVITAMIN ADULT EXTRA C PO), multivitamin, Disp: , Rfl:   •  omeprazole (priLOSEC) 40 MG capsule, Take 40 mg by mouth Daily., Disp: , Rfl:     Allergies   Allergen Reactions   • Codeine GI Intolerance     As a young child       Objective     Physical Exam:  Vital Signs: There were no vitals filed for this visit.  There is no height or weight on file to calculate BMI.     Mental Status Exam:   Hygiene:   good  Cooperation:  Cooperative  Eye Contact:  Good  Psychomotor Behavior:  Slow  Affect:  Full range  Mood: normal  Speech:  Pressured  Thought Process:  Goal directed  Thought Content:  Normal  Suicidal:  None  Homicidal:  None  Hallucinations:  None  Delusion:  None  Memory:  Intact  Orientation:  Grossly intact  Reliability:  good  Insight:  Good  Judgement:  Good  Impulse Control:  Good  Physical/Medical Issues:  Yes Patient suffers from restrictor movement and chronic pain from his related to a triple hernia.     Assessment / Plan      Diagnosis:  Diagnoses and all orders for this visit:    1. Generalized anxiety disorder  (Primary)    2. Moderate episode of recurrent major depressive disorder (HCC)    Patient presented for follow-up with clinician.  Patient reported having an appointment to address possible nerve ending treatment for their chronic pain.  Patient and clinician processed patient anxiety regarding upcoming medical appointment.  Patient and clinician processed patient anxiety regarding financial strain perceived by patient due to the prolonged periods their household has been limited to one income due to patient's limited mobility and functioning.  Patient and clinician agree to a future course of treatment that focuses on mindfulness to address symptoms of anxiety and depression as well as chronic pain.  Patient was receptive to interventions used in session.    Progress toward goal: Patient has made minimal progress towards long-term goal of better management of emotions through healthy coping mechanisms learned in psychotherapy.    Prognosis: Prognosis is good with continued outpatient therapy.    PLAN:  Patient and clinician will continue integrating healthy coping mechanisms based and mindfulness and cognitive behavioral interventions to assist patient with anxiety and depression symptoms as well as chronic pain.    1. Safety: No acute safety concerns  2. Risk Assessment: Risk of self-harm acutely is low. Risk of self-harm chronically is also low, but could be further elevated in the event of treatment noncompliance and/or AODA.    Treatment Plan/Goals: Continue supportive psychotherapy efforts and medications as indicated. Treatment and medication options discussed during today's visit. Patient ackowledged and verbally consented to continue with current treatment plan and was educated on the importance of compliance with treatment and follow-up appointments. Patient seems reasonably able to adhere to treatment plan.      Assisted Patient in processing above session content; acknowledged and normalized patient’s  thoughts, feelings, and concerns.  Rationalized patient thought process regarding anxiety and depression.      Allowed Patient to freely discuss issues  without interruption or judgement with unconditional positive regard, active listening skills, and empathy. Therapist provided a safe, confidential environment to facilitate the development of a positive therapeutic relationship and encouraged open, honest communication. Assisted Patient in identifying risk factors which would indicate the need for higher level of care including thoughts to harm self or others and/or self-harming behavior and encouraged Patient to contact this office, call 911, or present to the nearest emergency room should any of these events occur. Discussed crisis intervention services and means to access. Patient adamantly and convincingly denies current suicidal or homicidal ideation or perceptual disturbance. Assisted Patient in processing session content; acknowledged and normalized Patient’s thoughts, feelings, and concerns by utilizing a person-centered approach in efforts to build appropriate rapport and a positive therapeutic relationship with open and honest communication. .     Follow Up:   No follow-ups on file.    Dionisio Luciano LCSW

## 2023-02-22 ENCOUNTER — TREATMENT (OUTPATIENT)
Dept: PHYSICAL THERAPY | Facility: CLINIC | Age: 53
End: 2023-02-22
Payer: COMMERCIAL

## 2023-02-22 DIAGNOSIS — M62.838 MUSCLE SPASM: ICD-10-CM

## 2023-02-22 DIAGNOSIS — M62.89 PELVIC FLOOR DYSFUNCTION: Primary | ICD-10-CM

## 2023-02-22 DIAGNOSIS — R10.2 PELVIC PAIN: ICD-10-CM

## 2023-02-22 PROCEDURE — 20561 NDL INSJ W/O NJX 3+ MUSC: CPT | Performed by: PHYSICAL THERAPIST

## 2023-02-22 NOTE — PROGRESS NOTES
Physical Therapy Daily Progress Note        Patient: Jeffery Olivas   : 1970  Diagnosis/ICD-10 Code:  Pelvic floor dysfunction [M62.89]  Referring practitioner: Jett Sharma MD  Date of Initial Visit: Type: THERAPY  Noted: 2022  Today's Date: 2023  Patient seen for 20 sessions         Patient reports: saw mental health therapist last week. First appointment at [facility]. Feels slightly optimistic about the plan for figuring out nerve diagnosis/treatment. Start seeing them for PT. His pain level is 5/10 upon arrival.          Objective   See Exercise, Manual, and Modality Logs for complete treatment.       Assessment/Plan  Patient tolerates DN well. Will continue to assist with decreasing pain    Continue DN on a self pay basis.            1040/1110  Timed:  Manual Therapy:    0     mins  02940;  Therapeutic Exercise:    0     mins  89200;     Neuromuscular Stephanie:    0    mins  14402;    Therapeutic Activity:     0     mins  41874;     Gait Trainin     mins  72621;     Ultrasound:     0     mins  18388;    Electrical Stimulation:    0     mins  36681 ( );    Untimed:  Electrical Stimulation:    0     mins  88215 ( );  Mechanical Traction:    0     mins  72907;     Timed Treatment:   0   mins   Total Treatment:     30   mins  SELF PAY DN      Solo Tovar PT  [place] License: [id number]

## 2023-02-24 ENCOUNTER — OFFICE VISIT (OUTPATIENT)
Dept: INTERNAL MEDICINE | Facility: CLINIC | Age: 53
End: 2023-02-24
Payer: COMMERCIAL

## 2023-02-24 VITALS
HEIGHT: 74 IN | SYSTOLIC BLOOD PRESSURE: 140 MMHG | HEART RATE: 87 BPM | OXYGEN SATURATION: 98 % | BODY MASS INDEX: 38.12 KG/M2 | DIASTOLIC BLOOD PRESSURE: 86 MMHG | WEIGHT: 297 LBS | TEMPERATURE: 97.7 F

## 2023-02-24 DIAGNOSIS — R10.2 CHRONIC PELVIC PAIN IN MALE: Primary | ICD-10-CM

## 2023-02-24 DIAGNOSIS — Z98.890 HISTORY OF INGUINAL HERNIA REPAIR, BILATERAL: ICD-10-CM

## 2023-02-24 DIAGNOSIS — G89.29 CHRONIC PELVIC PAIN IN MALE: Primary | ICD-10-CM

## 2023-02-24 DIAGNOSIS — F32.2 SEVERE DEPRESSION: ICD-10-CM

## 2023-02-24 DIAGNOSIS — Z87.19 HISTORY OF INGUINAL HERNIA REPAIR, BILATERAL: ICD-10-CM

## 2023-02-24 PROCEDURE — 99214 OFFICE O/P EST MOD 30 MIN: CPT | Performed by: INTERNAL MEDICINE

## 2023-02-24 RX ORDER — DULOXETIN HYDROCHLORIDE 60 MG/1
60 CAPSULE, DELAYED RELEASE ORAL DAILY
Qty: 30 CAPSULE | Refills: 1 | Status: SHIPPED | OUTPATIENT
Start: 2023-02-24 | End: 2023-03-15 | Stop reason: SINTOL

## 2023-02-24 NOTE — PROGRESS NOTES
"Internal Medicine Follow Up    Chief Complaint  Jeffery Olivas is a 53 y.o. male who presents today for follow up of chronic medical conditions outlined below.    Chief Complaint   Patient presents with   • Depression        HPI  Mr. Olivas comes in today for follow up. He has established with [facility] and has a procedure planned to hopefully treat his chronic pain. He will also be continuing weekly pelvic floor PT. He is going to have MRI in a week and a half. Currently his pain is stable, unchanged. He does note intermittent pulling sensation in his L lower abdomen/groin. This is not like the pain with his hernia and he has not felt a bulge. He cannot reproduce the pain with palpation. His mood is also not significantly changed despite counseling and cymbalta 30mg daily. He is interested in increasing this dose.       Review of Systems  Review of Systems   Genitourinary: Positive for testicular pain.        +pelvic and groin pain   Psychiatric/Behavioral: Positive for depressed mood and stress.        Current Medications  Current Outpatient Medications on File Prior to Visit   Medication Sig Dispense Refill   • Multiple Vitamins-Minerals (MULTIVITAMIN ADULT EXTRA C PO) multivitamin     • omeprazole (priLOSEC) 40 MG capsule Take 40 mg by mouth Daily.     • [DISCONTINUED] DULoxetine (CYMBALTA) 30 MG capsule Take 1 capsule by mouth Daily. 30 capsule 1     No current facility-administered medications on file prior to visit.       Allergies  Allergies   Allergen Reactions   • Codeine GI Intolerance     As a young child       Objective  Visit Vitals  /86   Pulse 87   Temp 97.7 °F (36.5 °C)   Ht 188 cm (74\")   Wt 135 kg (297 lb)   SpO2 98%   BMI 38.13 kg/m²        Physical Exam  Physical Exam  Vitals and nursing note reviewed.   Constitutional:       General: He is not in acute distress.     Appearance: He is well-developed.      Comments: Sitting in chair, reclined back   HENT:      Head: " Normocephalic and atraumatic.   Eyes:      Conjunctiva/sclera: Conjunctivae normal.   Pulmonary:      Effort: Pulmonary effort is normal. No respiratory distress.   Skin:     General: Skin is warm and dry.   Neurological:      Mental Status: He is alert and oriented to person, place, and time.   Psychiatric:         Mood and Affect: Mood is depressed. Affect is tearful.         Results  Results for orders placed or performed in visit on 04/29/22   POC Glycosylated Hemoglobin (Hb A1C)    Specimen: Blood   Result Value Ref Range    Hemoglobin A1C 8.0 %    Lot Number [id number]     Expiration Date 1/17/24         Assessment and Plan  Diagnoses and all orders for this visit:    Chronic pelvic pain in male  - 2 years of pelvic pain affecting every aspect of his life  - no improvement with pelvic floor PT, treatment for prostatitis with rounds of abx and antiinflammatories, surgical repair of inguinal and umbilical hernias  - CT abd/pelvis w and wo contrast previously negative  - has upcoming MRI of lumbar spine and pelvis to evaluate for possible radicular pain  - has started seeing [facility] for management of potential pudendal nerve entrapment or neuralgia  - so far cymbalta ineffective for pain control, increase dose to 60mg daily    Severe depression (HCC)  - due to chronic pain x2y  - he has passive SI, no active thoughts of self harm.   - he does not discuss this with family so limited support system  - cymbalta 30mg daily so far ineffective so will increase to 60mg daily  - benefiting from counseling  - will see him back again in 2 weeks    History of inguinal hernia repair, bilateral  - has pulling intermittently in L groin but no bulge or reproducible pain. Suspect this is from scar tissue related to prior inguinal hernia repair. Will monitor.     Health Maintenance  - Colonoscopy: defers  - HCV: defers  - Immunizations: HBV #1, flu UTD. COVID discussed. PCV20 discussed.  - Depression  screening: PHQ9 = 11 2/2023    Return in about 13 days (around 3/9/2023) for Follow up MRI results.  Answers for HPI/ROS submitted by the patient on 2/17/2023  What is the primary reason for your visit?: Other  Please describe your symptoms.: Follow-Up  Have you had these symptoms before?: Yes  How long have you been having these symptoms?: Greater than 2 weeks  Please list any medications you are currently taking for this condition.: Please see My Chart  Please describe any probable cause for these symptoms. : Please see My Chart

## 2023-02-28 ENCOUNTER — TREATMENT (OUTPATIENT)
Dept: PHYSICAL THERAPY | Facility: CLINIC | Age: 53
End: 2023-02-28
Payer: COMMERCIAL

## 2023-02-28 DIAGNOSIS — M62.89 PELVIC FLOOR DYSFUNCTION: ICD-10-CM

## 2023-02-28 DIAGNOSIS — R10.2 PELVIC PAIN: ICD-10-CM

## 2023-02-28 DIAGNOSIS — M62.838 MUSCLE SPASM: Primary | ICD-10-CM

## 2023-02-28 PROCEDURE — 20561 NDL INSJ W/O NJX 3+ MUSC: CPT | Performed by: PHYSICAL THERAPIST

## 2023-02-28 NOTE — PROGRESS NOTES
Physical Therapy Daily Progress Note        Patient: Jeffery Olivas   : 1970  Diagnosis/ICD-10 Code:  No primary diagnosis found.  Referring practitioner: Jett Sharma MD  Date of Initial Visit: Type: THERAPY  Noted: 2022  Today's Date: 2023  Patient seen for 21 sessions         Patient reports: doing group therapy and saw PCP. This week he has individual counseling and [facility] diagnosis/treatment sesion. His pain level is 5-6/10 upon arrival. Had a bad day yesterday where he woke up and couldn't get comfortable. Was sick from pain 9-10/10. Pulled over half way on trip today.           Objective   See Exercise, Manual, and Modality Logs for complete treatment.       Assessment/Plan  Patient tolerates DN well.     Continue DN on a self pay basis.              Timed:  Manual Therapy:    0     mins  99663;  Therapeutic Exercise:    0     mins  16903;     Neuromuscular Stephanie:    0    mins  48385;    Therapeutic Activity:     0     mins  49246;     Gait Trainin     mins  73860;     Ultrasound:     0     mins  99860;    Electrical Stimulation:    0     mins  55638 ( );    Untimed:  Electrical Stimulation:    0     mins  54967 ( );  Mechanical Traction:    0     mins  85098;     Timed Treatment:   0   mins   Total Treatment:     25   mins  SELF PAY DN      [place] License: [id number]

## 2023-03-02 ENCOUNTER — OFFICE VISIT (OUTPATIENT)
Dept: BEHAVIORAL HEALTH | Facility: CLINIC | Age: 53
End: 2023-03-02
Payer: COMMERCIAL

## 2023-03-02 DIAGNOSIS — F41.1 GENERALIZED ANXIETY DISORDER: Primary | ICD-10-CM

## 2023-03-02 DIAGNOSIS — F33.1 MODERATE EPISODE OF RECURRENT MAJOR DEPRESSIVE DISORDER: ICD-10-CM

## 2023-03-02 PROCEDURE — 90834 PSYTX W PT 45 MINUTES: CPT | Performed by: SOCIAL WORKER

## 2023-03-02 NOTE — PROGRESS NOTES
[facility] [place]                 Follow Up Adult      Follow Up Adult Note     Date:2023   Patient Name: Jeffery Olivas  : 1970   MRN: [id number]   Time IN: 12:03 pm   Time OUT: 12:54 pm     Referring Provider: Tonia Webb MD    Chief Complaint:      ICD-10-CM ICD-9-CM   1. Generalized anxiety disorder  F41.1 300.02   2. Moderate episode of recurrent major depressive disorder (HCC)  F33.1 296.32        History of Present Illness:   Jeffery Olivas is a 53 y.o. male who is being seen today for follow up counseling for anxiety and depression.    Subjective               Patient's Support Network Includes: extended family    Functional Status: Severe impairment      Current Outpatient Medications:   •  DULoxetine (CYMBALTA) 60 MG capsule, Take 1 capsule by mouth Daily., Disp: 30 capsule, Rfl: 1  •  Multiple Vitamins-Minerals (MULTIVITAMIN ADULT EXTRA C PO), multivitamin, Disp: , Rfl:   •  omeprazole (priLOSEC) 40 MG capsule, Take 40 mg by mouth Daily., Disp: , Rfl:     Allergies   Allergen Reactions   • Codeine GI Intolerance     As a young child       Objective     Physical Exam:  Vital Signs: There were no vitals filed for this visit.  There is no height or weight on file to calculate BMI.     Mental Status Exam:   Hygiene:   good  Cooperation:  Cooperative  Eye Contact:  Good  Psychomotor Behavior:  Appropriate  Affect:  Full range  Mood: normal  Speech:  Normal  Thought Process:  Goal directed  Thought Content:  Normal  Suicidal:  None  Homicidal:  None  Hallucinations:  None  Delusion:  None  Memory:  Intact  Orientation:  Grossly intact  Reliability:  good  Insight:  Good  Judgement:  Good  Impulse Control:  Good  Physical/Medical Issues:  Yes Chronic pain related to a patient repair and hernia.  Patient reports severe back pain that it is in the process of being diagnosed     Assessment / Plan      Diagnosis:  Diagnoses and all orders for  this visit:    1. Generalized anxiety disorder (Primary)    2. Moderate episode of recurrent major depressive disorder (HCC)    Patient presented for follow-up with clinician.  Patient reported [facility] with [facility].  Patient reported the process had begun of isolating nerve groups through the numbing in an attempt to isolate and diagnose the source of their debilitating pain.  Patient reported they had received their first injection in the tailbone.  Patient and clinician processed negative cognitions related to patient fears of hopelessness and inability to return to a normal level of functioning.  Clinician utilized a person centered approach to emphasize patient strengths and resilience.  Patient was receptive to intervention.  Patient was an active participant in session.    Progress toward goal: Patient has made incremental progress towards the long-term goal of independent emotional regulation of symptoms of anxiety and depression.    Prognosis: The the prognosis for the patient is good with continued outpatient therapy.    PLAN:  Patient and clinician will continue to utilize a cognitive behavioral intervention which focuses on addressing maladaptive thought patterns related to low self image.  Sessions will occur approximately every 14 days and last between 40 and 60 minutes in duration.    1. Safety: No acute safety concerns  2. Risk Assessment: Risk of self-harm acutely is low. Risk of self-harm chronically is also low, but could be further elevated in the event of treatment noncompliance and/or AODA.    Treatment Plan/Goals: Continue supportive psychotherapy efforts and medications as indicated. Treatment and medication options discussed during today's visit. Patient ackowledged and verbally consented to continue with current treatment plan and was educated on the importance of compliance with treatment and follow-up appointments. Patient seems reasonably able to adhere to  treatment plan.      Assisted Patient in processing above session content; acknowledged and normalized patient’s thoughts, feelings, and concerns.  Rationalized patient thought process regarding symptoms of anxiety and depression.      Allowed Patient to freely discuss issues  without interruption or judgement with unconditional positive regard, active listening skills, and empathy. Therapist provided a safe, confidential environment to facilitate the development of a positive therapeutic relationship and encouraged open, honest communication. Assisted Patient in identifying risk factors which would indicate the need for higher level of care including thoughts to harm self or others and/or self-harming behavior and encouraged Patient to contact this office, call 911, or present to the nearest emergency room should any of these events occur. Discussed crisis intervention services and means to access. Patient adamantly and convincingly denies current suicidal or homicidal ideation or perceptual disturbance. Assisted Patient in processing session content; acknowledged and normalized Patient’s thoughts, feelings, and concerns by utilizing a person-centered approach in efforts to build appropriate rapport and a positive therapeutic relationship with open and honest communication. .     Follow Up:   No follow-ups on file.    Dionisio Luciano LCSW

## 2023-03-07 ENCOUNTER — HOSPITAL ENCOUNTER (OUTPATIENT)
Dept: MRI IMAGING | Facility: HOSPITAL | Age: 53
Discharge: HOME | End: 2023-03-07
Payer: COMMERCIAL

## 2023-03-07 ENCOUNTER — TREATMENT (OUTPATIENT)
Dept: PHYSICAL THERAPY | Facility: CLINIC | Age: 53
End: 2023-03-07
Payer: COMMERCIAL

## 2023-03-07 DIAGNOSIS — G89.29 CHRONIC MIDLINE LOW BACK PAIN WITHOUT SCIATICA: ICD-10-CM

## 2023-03-07 DIAGNOSIS — M62.838 MUSCLE SPASM: ICD-10-CM

## 2023-03-07 DIAGNOSIS — R10.2 PELVIC PAIN: ICD-10-CM

## 2023-03-07 DIAGNOSIS — G89.29 CHRONIC PELVIC PAIN IN MALE: ICD-10-CM

## 2023-03-07 DIAGNOSIS — M62.89 PELVIC FLOOR DYSFUNCTION: Primary | ICD-10-CM

## 2023-03-07 DIAGNOSIS — R10.2 CHRONIC PELVIC PAIN IN MALE: ICD-10-CM

## 2023-03-07 DIAGNOSIS — M54.50 CHRONIC MIDLINE LOW BACK PAIN WITHOUT SCIATICA: ICD-10-CM

## 2023-03-07 PROCEDURE — 72195 MRI PELVIS W/O DYE: CPT

## 2023-03-07 PROCEDURE — 72148 MRI LUMBAR SPINE W/O DYE: CPT

## 2023-03-07 PROCEDURE — 20561 NDL INSJ W/O NJX 3+ MUSC: CPT | Performed by: PHYSICAL THERAPIST

## 2023-03-09 ENCOUNTER — TELEPHONE (OUTPATIENT)
Dept: INTERNAL MEDICINE | Facility: CLINIC | Age: 53
End: 2023-03-09

## 2023-03-09 ENCOUNTER — PATIENT MESSAGE (OUTPATIENT)
Dept: INTERNAL MEDICINE | Facility: CLINIC | Age: 53
End: 2023-03-09

## 2023-03-09 NOTE — TELEPHONE ENCOUNTER
Caller: Jeffery Olivas    Relationship to patient: Self    Best call back number: [phone]    Chief complaint:     Type of visit: OFFICE VISIT -FOLLOW UP    Requested date: 03/13/23-03/14/23     If rescheduling, when is the original appointment: 03/09/23    Additional notes:PATIENT HAD A FAMILY EMERGENCY AND COULD NOT MAKE THIS APPOINTMENT  PASTIENT WANT TO GET BACK IN WITH YOU ASAP .    PLEASE CALL PATIENT IF YOU CAN WORK HIM IN

## 2023-03-09 NOTE — TELEPHONE ENCOUNTER
From: Jeffery Olivas  To: Tonia Webb  Sent: 3/9/2023 10:55 AM EST  Subject: Canceled Appointment    Hi Dr. Webb,   I called a little bit ago. We've had a family emergency. My wife and I are at the hospital now. I canceled my appointment with you for this afternoon. I asked to reschedule this for as soon as possible. I know it's important for me to see you and go over test results. I'm hoping we can reschedule for Monday or Tuesday. I apologize for this. I'm sorry.  Jeffery

## 2023-03-09 NOTE — TELEPHONE ENCOUNTER
Pt called. He had family emergency and had to cancel today's appt.    He wants an appt next week to discuss test results.    I scheduled him for may 1st--next available appt.      Call pt can leave message  [phone]

## 2023-03-10 NOTE — PROGRESS NOTES
Physical Therapy Daily Progress Note        Patient: Jeffery Olivas   : 1970  Diagnosis/ICD-10 Code:  Pelvic floor dysfunction [M62.89]  Referring practitioner: Jett Sharma MD  Date of Initial Visit: Type: THERAPY  Noted: 2022  Today's Date: 3/10/2023  Patient seen for 22 sessions         Patient reports: had an injection for his tailbone and had some minor relief though he doesn't feel this is exactly the area that is the issue. His pain level is 6/10 upon arrival.          Objective   See Exercise, Manual, and Modality Logs for complete treatment.       Assessment/Plan  Patient tolerates DN well. He is seeing another PT for ortho starting today and has started injections with [facility] with mild improvement in symptoms. Change with pelvic PT/DN has been min-mild.     Continue DN on a self pay basis.              Timed:  Manual Therapy:    0     mins  35852;  Therapeutic Exercise:    0     mins  37218;     Neuromuscular Stephanie:    0    mins  17010;    Therapeutic Activity:     0     mins  28632;     Gait Trainin     mins  35130;     Ultrasound:     0     mins  10341;    Electrical Stimulation:    0     mins  98469 ( );    Untimed:  Electrical Stimulation:    0     mins  91563 ( );  Mechanical Traction:    0     mins  88878;     Timed Treatment:   0   mins   Total Treatment:     30   mins  SELF PAY DN      30 mins      Solo Tovar PT  [place] License: [id number]

## 2023-03-14 ENCOUNTER — TREATMENT (OUTPATIENT)
Dept: PHYSICAL THERAPY | Facility: CLINIC | Age: 53
End: 2023-03-14

## 2023-03-14 DIAGNOSIS — R10.2 PELVIC PAIN: ICD-10-CM

## 2023-03-14 DIAGNOSIS — M62.89 PELVIC FLOOR DYSFUNCTION: ICD-10-CM

## 2023-03-14 DIAGNOSIS — M62.838 MUSCLE SPASM: Primary | ICD-10-CM

## 2023-03-14 PROCEDURE — 20561 NDL INSJ W/O NJX 3+ MUSC: CPT | Performed by: PHYSICAL THERAPIST

## 2023-03-14 NOTE — PROGRESS NOTES
Physical Therapy Daily Progress Note        Patient: Jeffery Olivas   : 1970  Diagnosis/ICD-10 Code:  Muscle spasm [M62.838]  Referring practitioner: Jett Sharma MD  Date of Initial Visit: Type: THERAPY  Noted: 2022  Today's Date: 3/14/2023  Patient seen for 23 sessions         Patient reports: minimal change. He continues to have difficulty with sitting prolonged periods and driving due to high levels of pain. He has to stop multiple times to get out of car due to pain, just to drive across town for a medical appointment. Pt reports that having to go on disability is very hard for him. He is seeing a mental health therapist and also in a group therapy for pain and coping.  His pain level is 6/10 upon arrival. He had MRI imaging last week and is following up with PCP this week. Seeing ortho PT who is recommending accupuncture.           Objective   See Exercise, Manual, and Modality Logs for complete treatment.       Assessment/Plan  Patient tolerates DN well. He is exploring further treatment options with Pain management MD and ortho PT.     Continue DN on a self pay basis.            0945/1015  Timed:  Manual Therapy:    0     mins  03756;  Therapeutic Exercise:    0     mins  62341;     Neuromuscular Stephanie:    0    mins  11001;    Therapeutic Activity:     0     mins  61996;     Gait Trainin     mins  07987;     Ultrasound:     0     mins  29978;    Electrical Stimulation:    0     mins  64213 ( );    Untimed:  Electrical Stimulation:    0     mins  77337 ( );  Mechanical Traction:    0     mins  46918;     Timed Treatment:   0   mins   Total Treatment:     30   mins  SELF PAY DN      Solo Tovar, PT  [place] License: [id number]

## 2023-03-15 ENCOUNTER — OFFICE VISIT (OUTPATIENT)
Dept: INTERNAL MEDICINE | Facility: CLINIC | Age: 53
End: 2023-03-15
Payer: COMMERCIAL

## 2023-03-15 VITALS
HEIGHT: 75 IN | HEART RATE: 88 BPM | SYSTOLIC BLOOD PRESSURE: 136 MMHG | TEMPERATURE: 97.8 F | OXYGEN SATURATION: 95 % | WEIGHT: 298 LBS | BODY MASS INDEX: 37.05 KG/M2 | DIASTOLIC BLOOD PRESSURE: 74 MMHG

## 2023-03-15 DIAGNOSIS — G89.29 CHRONIC PELVIC PAIN IN MALE: Primary | ICD-10-CM

## 2023-03-15 DIAGNOSIS — R10.2 CHRONIC PELVIC PAIN IN MALE: Primary | ICD-10-CM

## 2023-03-15 DIAGNOSIS — F32.2 SEVERE DEPRESSION: ICD-10-CM

## 2023-03-15 PROCEDURE — 99214 OFFICE O/P EST MOD 30 MIN: CPT | Performed by: INTERNAL MEDICINE

## 2023-03-15 RX ORDER — NORTRIPTYLINE HYDROCHLORIDE 10 MG/1
10 CAPSULE ORAL NIGHTLY
Qty: 30 CAPSULE | Refills: 1 | Status: SHIPPED | OUTPATIENT
Start: 2023-03-15

## 2023-03-15 NOTE — PROGRESS NOTES
"Internal Medicine Follow Up    Chief Complaint  Jeffery Olivas is a 53 y.o. male who presents today for follow up of chronic medical conditions outlined below.    Chief Complaint   Patient presents with   • Results     Review MRI results        HPI  Mr. Olivas comes in today for follow up. He has had MRI of low back and pelvis which showed a T2 hyperintensity of L pudendal nerve possible vascular structures. There was no other significant pathology noted. He has been to [facility] and had L inferior hypogastric plexus block with 30-40% relief of back discomfort but no relief from pelvis. Relief was short lived. Notes mention trial of gabapentin but he reports this was not started. He did try cymbalta 60mg daily for about a week but had drowsiness, flushing so quit. Mood remains depressed. He is still in counseling and also in a pain support group at [facility]. He has filed for disability due to inability to sit or stand for long periods of time and inability to work. He will see [facility] on Friday and hopes to bring CD with MRI images.       Review of Systems  Review of Systems   Genitourinary: Positive for testicular pain.        +pelvic pain   Psychiatric/Behavioral: Positive for depressed mood and stress.        Current Medications  Current Outpatient Medications on File Prior to Visit   Medication Sig Dispense Refill   • Multiple Vitamins-Minerals (MULTIVITAMIN ADULT EXTRA C PO) multivitamin     • omeprazole (priLOSEC) 40 MG capsule Take 1 capsule by mouth Daily.     • [DISCONTINUED] DULoxetine (CYMBALTA) 60 MG capsule Take 1 capsule by mouth Daily. (Patient not taking: Reported on 3/15/2023) 30 capsule 1     No current facility-administered medications on file prior to visit.       Allergies  Allergies   Allergen Reactions   • Codeine GI Intolerance     As a young child       Objective  Visit Vitals  /74   Pulse 88   Temp 97.8 °F (36.6 °C)   Ht 190.5 cm (75\")   Wt 135 kg (298 lb)   SpO2 95%   BMI 37.25 " kg/m²        Physical Exam  Physical Exam  Vitals and nursing note reviewed.   Constitutional:       General: He is not in acute distress.     Appearance: He is well-developed.      Comments: Sitting in chair, reclined back   HENT:      Head: Normocephalic and atraumatic.   Eyes:      Conjunctiva/sclera: Conjunctivae normal.   Pulmonary:      Effort: Pulmonary effort is normal. No respiratory distress.   Skin:     General: Skin is warm and dry.   Neurological:      Mental Status: He is alert and oriented to person, place, and time.   Psychiatric:         Mood and Affect: Mood is depressed. Affect is tearful.         Results  Results for orders placed or performed in visit on 04/29/22   POC Glycosylated Hemoglobin (Hb A1C)    Specimen: Blood   Result Value Ref Range    Hemoglobin A1C 8.0 %    Lot Number [id number]     Expiration Date 1/17/24         Assessment and Plan  Diagnoses and all orders for this visit:    Chronic pelvic pain in male  - 2 years of pelvic pain affecting every aspect of his life. Now filing for disability.  - no improvement with pelvic floor PT, treatment for prostatitis with rounds of abx and antiinflammatories, surgical repair of inguinal and umbilical hernias  - CT abd/pelvis w and wo contrast previously negative. MRI L spine negative and pelvis with possible T2 hyperintensity of pudendal nerve but question if this is expected vascular structures.  - has started seeing [facility] for management of potential pudendal nerve entrapment or neuralgia  - switching from cymbalta to nortriptyline as noted below    Severe depression (HCC)  - due to chronic pain x2y  - he has passive SI, no active thoughts of self harm.   - he does not discuss this with family so limited support system  - cymbalta 30mg daily ineffective and higher dose not tolerated. Switch to nortriptyline 10mg qhs.  - benefiting from counseling  - will see him back again in 4 weeks     Health Maintenance  -  Colonoscopy: defers  - HCV: defers  - Immunizations: HBV #1, flu UTD. COVID discussed. PCV20 discussed.  - Depression screening: PHQ9 = 11 2/2023    Return in about 4 weeks (around 4/12/2023) for Follow up 30 minutes.  Answers for HPI/ROS submitted by the patient on 3/9/2023  What is the primary reason for your visit?: Other  Please describe your symptoms.: Follow-Up  Have you had these symptoms before?: Yes  How long have you been having these symptoms?: Greater than 2 weeks  Please list any medications you are currently taking for this condition.: Please see My Chart  Please describe any probable cause for these symptoms. : Follow-Up

## 2023-03-16 ENCOUNTER — OFFICE VISIT (OUTPATIENT)
Dept: BEHAVIORAL HEALTH | Facility: CLINIC | Age: 53
End: 2023-03-16
Payer: COMMERCIAL

## 2023-03-16 DIAGNOSIS — F32.1 CURRENT MODERATE EPISODE OF MAJOR DEPRESSIVE DISORDER, UNSPECIFIED WHETHER RECURRENT: ICD-10-CM

## 2023-03-16 DIAGNOSIS — F41.1 GENERALIZED ANXIETY DISORDER: Primary | ICD-10-CM

## 2023-03-16 PROCEDURE — 90834 PSYTX W PT 45 MINUTES: CPT | Performed by: SOCIAL WORKER

## 2023-03-21 ENCOUNTER — TREATMENT (OUTPATIENT)
Dept: PHYSICAL THERAPY | Facility: CLINIC | Age: 53
End: 2023-03-21
Payer: COMMERCIAL

## 2023-03-21 DIAGNOSIS — M62.838 MUSCLE SPASM: Primary | ICD-10-CM

## 2023-03-21 DIAGNOSIS — M62.89 PELVIC FLOOR DYSFUNCTION: ICD-10-CM

## 2023-03-21 DIAGNOSIS — R10.2 PELVIC PAIN: ICD-10-CM

## 2023-03-21 PROCEDURE — 20561 NDL INSJ W/O NJX 3+ MUSC: CPT | Performed by: PHYSICAL THERAPIST

## 2023-03-21 NOTE — PROGRESS NOTES
Physical Therapy Daily Progress Note        Patient: Jeffery Olivas   : 1970  Diagnosis/ICD-10 Code:  Muscle spasm [M62.838]  Referring practitioner: Jett Sharma MD  Date of Initial Visit: Type: THERAPY  Noted: 2022  Today's Date: 3/21/2023  Patient seen for 24 sessions         Patient reports: PCP took him off Cymbalta and changed to Nortryptyline.  His pain level is 6/10 upon arrival. Scheduled for additional injections and group therapy.   Starts PT with [facility].           Objective   See Exercise, Manual, and Modality Logs for complete treatment.       Assessment/Plan  Patient tolerates DN well. He is going to be transitioning to ortho PT and acupuncture in the next 1-2 visits.      Continue DN on a self pay basis.              Timed:  Manual Therapy:    0     mins  29529;  Therapeutic Exercise:    0     mins  86449;     Neuromuscular Stephanie:    0    mins  94640;    Therapeutic Activity:     0     mins  31036;     Gait Trainin     mins  53363;     Ultrasound:     0     mins  16045;    Electrical Stimulation:    0     mins  92884 ( );    Untimed:  Electrical Stimulation:    0     mins  14586 ( );  Mechanical Traction:    0     mins  31240;     Timed Treatment:   0   mins   Total Treatment:     30   mins  SELF PAY DN      Solo Tovar PT  [place] License: [id number]

## 2023-03-28 ENCOUNTER — TREATMENT (OUTPATIENT)
Dept: PHYSICAL THERAPY | Facility: CLINIC | Age: 53
End: 2023-03-28

## 2023-03-28 DIAGNOSIS — R10.2 PELVIC PAIN: ICD-10-CM

## 2023-03-28 DIAGNOSIS — M62.838 MUSCLE SPASM: Primary | ICD-10-CM

## 2023-03-28 DIAGNOSIS — M62.89 PELVIC FLOOR DYSFUNCTION: ICD-10-CM

## 2023-03-28 PROCEDURE — 20561 NDL INSJ W/O NJX 3+ MUSC: CPT | Performed by: PHYSICAL THERAPIST

## 2023-03-28 NOTE — PROGRESS NOTES
Physical Therapy Daily Progress Note        Patient: Jeffery Olivas   : 1970  Diagnosis/ICD-10 Code:  Muscle spasm [M62.838]  Referring practitioner: Jett Sharma MD  Date of Initial Visit: Type: THERAPY  Noted: 2022  Today's Date: 3/28/2023  Patient seen for 25 sessions         Patient reports: had a nerve block last week. Thinks it took the edge off of pain radiating in perineal/testicle area but improvement was relatively short lived. Not the magic bullet to fix pain problems.  His pain level is 6/10 upon arrival.          Objective   See Exercise, Manual, and Modality Logs for complete treatment.       Assessment/Plan  Patient tolerates DN well. He will be seen 1-2 more visits then transition to Ortho PT with [facility].     Continue DN on a self pay basis.            09456  Timed:  Manual Therapy:    0     mins  50082;  Therapeutic Exercise:    0     mins  16583;     Neuromuscular Stephanie:    0    mins  70262;    Therapeutic Activity:     0     mins  36445;     Gait Trainin     mins  51871;     Ultrasound:     0     mins  05418;    Electrical Stimulation:    0     mins  04761 ( );    Untimed:  Electrical Stimulation:    0     mins  89252 ( );  Mechanical Traction:    0     mins  95495;     Timed Treatment:   0   mins   Total Treatment:     30   mins  SELF PAY DN  _30_      Solo Tovar PT  [place] License: [id number]

## 2023-03-30 ENCOUNTER — OFFICE VISIT (OUTPATIENT)
Dept: BEHAVIORAL HEALTH | Facility: CLINIC | Age: 53
End: 2023-03-30
Payer: COMMERCIAL

## 2023-03-30 DIAGNOSIS — F41.1 GENERALIZED ANXIETY DISORDER: Primary | ICD-10-CM

## 2023-03-30 DIAGNOSIS — F32.1 CURRENT MODERATE EPISODE OF MAJOR DEPRESSIVE DISORDER, UNSPECIFIED WHETHER RECURRENT: ICD-10-CM

## 2023-03-30 PROCEDURE — 90834 PSYTX W PT 45 MINUTES: CPT | Performed by: SOCIAL WORKER

## 2023-03-30 NOTE — PROGRESS NOTES
[facility] [place]                 Follow Up Adult      Follow Up Adult Note     Date:2023   Patient Name: Jeffery Olivas  : 1970   MRN: [id number]   Time IN: 8:58 am    Time OUT: 9:47 am     Referring Provider: Tonia Webb MD    Chief Complaint:      ICD-10-CM ICD-9-CM   1. Generalized anxiety disorder  F41.1 300.02   2. Current moderate episode of major depressive disorder, unspecified whether recurrent (HCC)  F32.1 296.22        History of Present Illness:   Jeffery Olivas is a 53 y.o. male who is being seen today for follow up counseling for anxiety and depression.    Subjective               Patient's Support Network Includes: extended family    Functional Status: Severe impairment      Current Outpatient Medications:   •  Multiple Vitamins-Minerals (MULTIVITAMIN ADULT EXTRA C PO), multivitamin, Disp: , Rfl:   •  nortriptyline (PAMELOR) 10 MG capsule, Take 1 capsule by mouth Every Night., Disp: 30 capsule, Rfl: 1  •  omeprazole (priLOSEC) 40 MG capsule, Take 1 capsule by mouth Daily., Disp: , Rfl:     Allergies   Allergen Reactions   • Codeine GI Intolerance     As a young child       Objective     Physical Exam:  Vital Signs: There were no vitals filed for this visit.  There is no height or weight on file to calculate BMI.     Mental Status Exam:   Hygiene:   good  Cooperation:  Cooperative  Eye Contact:  Good  Psychomotor Behavior:  Appropriate  Affect:  Full range  Mood: normal  Speech:  Normal  Thought Process:  Goal directed  Thought Content:  Normal  Suicidal:  None  Homicidal:  None  Hallucinations:  None  Delusion:  None  Memory:  Intact  Orientation:  Grossly intact  Reliability:  good  Insight:  Good  Judgement:  Good  Impulse Control:  Good  Physical/Medical Issues:  No      Assessment / Plan      Diagnosis:  Diagnoses and all orders for this visit:    1. Generalized anxiety disorder (Primary)    2. Current moderate episode of  major depressive disorder, unspecified whether recurrent (HCC)    Patient presented for follow-up with clinician.  Patient and clinician processed patient frustration with physical periods of isolation as well as the overwhelming feeling of being emotionally isolated due to their status as a chronic pain patient.  Patient and clinician identified healthy coping mechanisms and community as well as family supports that assist patient and temporary relief of both physical and emotional undesired symptoms.  Clinician utilized active listening and reflective response to convey empathy and support.  Patient was receptive to intervention.  Patient was an active participant in session.    Progress toward goal: Patient continues to make incremental progress towards long-term goal of independent regulation of symptoms of anxiety and depression.    Prognosis: Prognosis for patient is fair with continued outpatient behavioral health treatment.    PLAN:  Clinician and patient will continue to utilize a cognitive behavioral intervention which focuses on maladaptive thought patterns related to patient anxiety and depression.    1. Safety: No acute safety concerns  2. Risk Assessment: Risk of self-harm acutely is low. Risk of self-harm chronically is also low, but could be further elevated in the event of treatment noncompliance and/or AODA.    Treatment Plan/Goals: Continue supportive psychotherapy efforts and medications as indicated. Treatment and medication options discussed during today's visit. Patient ackowledged and verbally consented to continue with current treatment plan and was educated on the importance of compliance with treatment and follow-up appointments. Patient seems reasonably able to adhere to treatment plan.      Assisted Patient in processing above session content; acknowledged and normalized patient’s thoughts, feelings, and concerns.  Rationalized patient thought process regarding anxiety and depression.       Allowed Patient to freely discuss issues  without interruption or judgement with unconditional positive regard, active listening skills, and empathy. Therapist provided a safe, confidential environment to facilitate the development of a positive therapeutic relationship and encouraged open, honest communication. Assisted Patient in identifying risk factors which would indicate the need for higher level of care including thoughts to harm self or others and/or self-harming behavior and encouraged Patient to contact this office, call 911, or present to the nearest emergency room should any of these events occur. Discussed crisis intervention services and means to access. Patient adamantly and convincingly denies current suicidal or homicidal ideation or perceptual disturbance. Assisted Patient in processing session content; acknowledged and normalized Patient’s thoughts, feelings, and concerns by utilizing a person-centered approach in efforts to build appropriate rapport and a positive therapeutic relationship with open and honest communication. .     Follow Up:   No follow-ups on file.    Dionisio Luciano LCSW

## 2023-04-04 ENCOUNTER — TREATMENT (OUTPATIENT)
Dept: PHYSICAL THERAPY | Facility: CLINIC | Age: 53
End: 2023-04-04

## 2023-04-04 DIAGNOSIS — R10.2 PELVIC PAIN: ICD-10-CM

## 2023-04-04 DIAGNOSIS — M62.89 PELVIC FLOOR DYSFUNCTION: ICD-10-CM

## 2023-04-04 DIAGNOSIS — M62.838 MUSCLE SPASM: Primary | ICD-10-CM

## 2023-04-04 PROCEDURE — 20561 NDL INSJ W/O NJX 3+ MUSC: CPT | Performed by: PHYSICAL THERAPIST

## 2023-04-07 NOTE — PROGRESS NOTES
Physical Therapy Daily Progress Note        Patient: Jeffery Olivas   : 1970  Diagnosis/ICD-10 Code:  Muscle spasm [M62.838]  Referring practitioner: No ref. provider found  Date of Initial Visit: Type: THERAPY  Noted: 2022  Today's Date: 2023  Patient seen for 26 sessions         Patient reports: will be starting ortho PT next week. Still having significant pain that limits function in daily life.  His pain level is 6/10 upon arrival with driving from his house to this appointment.           Objective   See Exercise, Manual, and Modality Logs for complete treatment.       Assessment/Plan  Patient tolerates DN well. Pt will likely be seen x 1 more visit and then transition care to [facility].     Continue DN on a self pay basis.            0945/5  Timed:  Manual Therapy:    0     mins  56505;  Therapeutic Exercise:    0     mins  25780;     Neuromuscular Stephanie:    0    mins  44868;    Therapeutic Activity:     0     mins  89084;     Gait Trainin     mins  42833;     Ultrasound:     0     mins  76127;    Electrical Stimulation:    0     mins  08855 ( );    Untimed:  Electrical Stimulation:    0     mins  55752 ( );  Mechanical Traction:    0     mins  92207;     Timed Treatment:   0   mins   Total Treatment:     30   mins  SELF PAY DN  _30__      Solo Tovar PT  [place] License: [id number]

## 2023-04-11 ENCOUNTER — OFFICE VISIT (OUTPATIENT)
Dept: INTERNAL MEDICINE | Facility: CLINIC | Age: 53
End: 2023-04-11
Payer: COMMERCIAL

## 2023-04-11 VITALS
HEART RATE: 105 BPM | OXYGEN SATURATION: 97 % | SYSTOLIC BLOOD PRESSURE: 122 MMHG | HEIGHT: 75 IN | WEIGHT: 291 LBS | BODY MASS INDEX: 36.18 KG/M2 | DIASTOLIC BLOOD PRESSURE: 76 MMHG | TEMPERATURE: 97.8 F

## 2023-04-11 DIAGNOSIS — R10.2 CHRONIC PELVIC PAIN IN MALE: Primary | ICD-10-CM

## 2023-04-11 DIAGNOSIS — E11.65 TYPE 2 DIABETES MELLITUS WITH HYPERGLYCEMIA, WITHOUT LONG-TERM CURRENT USE OF INSULIN: ICD-10-CM

## 2023-04-11 DIAGNOSIS — R11.0 NAUSEA: ICD-10-CM

## 2023-04-11 DIAGNOSIS — F32.2 SEVERE DEPRESSION: ICD-10-CM

## 2023-04-11 DIAGNOSIS — G89.29 CHRONIC PELVIC PAIN IN MALE: Primary | ICD-10-CM

## 2023-04-11 PROCEDURE — 99214 OFFICE O/P EST MOD 30 MIN: CPT | Performed by: INTERNAL MEDICINE

## 2023-04-11 RX ORDER — ONDANSETRON 4 MG/1
4 TABLET, FILM COATED ORAL ONCE
Status: SHIPPED | OUTPATIENT
Start: 2023-04-11

## 2023-04-11 RX ORDER — GABAPENTIN 300 MG/1
300 CAPSULE ORAL DAILY
COMMUNITY
Start: 2023-03-17

## 2023-04-11 RX ORDER — ONDANSETRON 4 MG/1
4 TABLET, ORALLY DISINTEGRATING ORAL EVERY 8 HOURS PRN
Qty: 15 TABLET | Refills: 0 | Status: SHIPPED | OUTPATIENT
Start: 2023-04-11

## 2023-04-11 NOTE — PROGRESS NOTES
Internal Medicine Follow Up    Chief Complaint  Jeffery Olivas is a 53 y.o. male who presents today for follow up of chronic medical conditions outlined below.    Chief Complaint   Patient presents with   • Chronic pelvic pain in male        HPI  Mr. Olivas comes in today for follow up. He has continued treatment with [facility] and reports two additional nerve blocks anteriorly to alleviate pelvic, testicular, and perineal discomfort. He has also been on gabapentin and notrtiptyline. He reports a possible 10% improvement and that pain is now more dull than sharp however it remains constant and worse when standing or sitting upright. He has had N/V since a couple days after starting gabapentin. He notes this worsened with increase in gabapentin dose so he contacted [facility] provider and has tapered back to 300mg nightly. Nausea persists. Worse in AM. Has been eating bland foods. He will see [facility] later this week to discuss stopping gabapentin. He wants to hold off on increasing dose of nortriptyline. Mood remains depressed. He is still in counseling and also in a pain support group at [facility]. He declines labs today.       Review of Systems  Review of Systems   Gastrointestinal: Positive for nausea and vomiting. Negative for constipation and diarrhea.   Genitourinary: Positive for testicular pain.        +pelvic pain   Musculoskeletal: Positive for back pain.   Psychiatric/Behavioral: Positive for dysphoric mood, depressed mood and stress. Negative for sleep disturbance.        Current Medications  Current Outpatient Medications on File Prior to Visit   Medication Sig Dispense Refill   • gabapentin (NEURONTIN) 300 MG capsule Take 1 capsule by mouth Daily.     • Multiple Vitamins-Minerals (MULTIVITAMIN ADULT EXTRA C PO) multivitamin     • nortriptyline (PAMELOR) 10 MG capsule Take 1 capsule by mouth Every Night. 30 capsule 1   • omeprazole (priLOSEC) 40 MG capsule Take 1 capsule by mouth Daily.       No current  "facility-administered medications on file prior to visit.       Allergies  Allergies   Allergen Reactions   • Gabapentin Nausea And Vomiting   • Codeine GI Intolerance     As a young child       Objective  Visit Vitals  /76   Pulse 105   Temp 97.8 °F (36.6 °C)   Ht 190.5 cm (75\")   Wt 132 kg (291 lb)   SpO2 97%   BMI 36.37 kg/m²        Physical Exam  Physical Exam  Vitals and nursing note reviewed.   Constitutional:       General: He is not in acute distress.     Appearance: He is well-developed.      Comments: Sitting in chair, reclined back   HENT:      Head: Normocephalic and atraumatic.   Eyes:      Conjunctiva/sclera: Conjunctivae normal.   Pulmonary:      Effort: Pulmonary effort is normal. No respiratory distress.   Skin:     General: Skin is warm and dry.   Neurological:      Mental Status: He is alert and oriented to person, place, and time.   Psychiatric:         Mood and Affect: Mood is depressed.         Results  Results for orders placed or performed in visit on 04/29/22   POC Glycosylated Hemoglobin (Hb A1C)    Specimen: Blood   Result Value Ref Range    Hemoglobin A1C 8.0 %    Lot Number [id number]     Expiration Date 1/17/24         Assessment and Plan  Diagnoses and all orders for this visit:    Chronic pelvic pain in male  - 2 years of pelvic pain affecting every aspect of his life. Has filed for disability.  - no significant improvement with pelvic floor PT, treatment for prostatitis with rounds of abx and antiinflammatories, surgical repair of inguinal and umbilical hernias, L inferior hypogastric plexus block, ganglion impar block  - CT abd/pelvis w and wo contrast previously negative. MRI L spine negative and pelvis with possible T2 hyperintensity of pudendal nerve but question if this is expected vascular structures.  - continues to see [facility]  - on nortriptyline and gabapentin however plan to stop gabapentin due to nausea. Lyrica may be alternative option he can " discuss with Liyah.    Nausea  - may be due to gabapentin clinically  - weaning gabapentin and will discuss alternative this week with pain mgmt  - zofran ODT 4mg given in office and short script sent to pharmacy  - discussed labs but he declines    Severe depression  - stable, on nortriptyline  - did not tolerate cymbalta    Type 2 diabetes mellitus with hyperglycemia, without long-term current use of insulin  - has had A1c up to 8 4/2022  - this has not been well managed up until now  - declines A1c today    Return in about 2 weeks (around 4/25/2023) for Follow up.  Answers for HPI/ROS submitted by the patient on 4/4/2023  What is the primary reason for your visit?: Other  Please describe your symptoms.: Follow-Up  Have you had these symptoms before?: Yes  How long have you been having these symptoms?: Greater than 2 weeks  Please list any medications you are currently taking for this condition.: Please see my chart...  Please describe any probable cause for these symptoms. : Please see my chart...

## 2023-04-12 ENCOUNTER — TREATMENT (OUTPATIENT)
Dept: PHYSICAL THERAPY | Facility: CLINIC | Age: 53
End: 2023-04-12
Payer: COMMERCIAL

## 2023-04-12 DIAGNOSIS — M62.89 PELVIC FLOOR DYSFUNCTION: ICD-10-CM

## 2023-04-12 DIAGNOSIS — M62.838 MUSCLE SPASM: Primary | ICD-10-CM

## 2023-04-12 DIAGNOSIS — R10.2 PELVIC PAIN: ICD-10-CM

## 2023-04-12 NOTE — PROGRESS NOTES
Discharge Summary  PHYSICAL THERAPY     [facility], [address]; [place] [address]           Patient: Jeffery Olivas   : 1970  Diagnosis/ICD-10 Code:  Muscle spasm [M62.838]  Referring practitioner: No ref. provider found  Date of Initial Visit: Type: THERAPY  Noted: 2022  Today's Date: 2023  Patient seen for 27 sessions      Subjective:   Jeffery Olivas reports: very nauseous due to medication he thinks, possibly Gabapentin. Working with MDs on this. Pt continues to have pain that is 5-6/10 sitting or standing 10-15 minutes max before increases. Lower abdominal and testicular pain. Still having to stop half way to drive here to lean back and take pressure off area. Wakes with pain at 1-2/10 then when up and moving around pain can still get up to 9/10 and that makes him throw up at times and will have to go lie down. Severely limiting function. At it worsens radiates into testicle. Issues with ejaculation have not changed. 20 minutes drive is at least a 40 minute drive to be able to go to appointments. Driving is worst 10, standing is second 10-15, sitting is third as far as worst 15    Subjective Questionnaire: NIH-CPSI  Clinical Progress: unchanged  Home Program Compliance: Yes  Treatment has included: therapeutic exercise, neuromuscular re-education, manual therapy, therapeutic activity and moist heat      Objective   Pt deferred physical exam today.     See flowsheet for details of treatment following reassessment.   Assessment/Plan  Progress to physical therapy goals is slow.He reports improved speed of recovery from pain at times but overall has had limited progress with pelvic PT including dry needling. He is working with [facility] to explore other treatment options including ortho PT and acupuncture as well as injections. He will be discharged to their services at this time.  He has met 1/3 short term goals and 0/5 long term goals. He is appropriate for discharge at this  time and has been instructed in maintenance HEP.   Progress toward previous goals: Not Met         STG's: 4 weeks  • Patient will report > 25% reduction in overall pain to assist with driving  • Patient will be able to sit 30 minutes before needing to stop due to pain for improved function with errands  • Patient will be able to perform HEP with minimal verbal cues -met     LTG's: By discharge  • Patient will report a decrease in pain to < 2/10 to allow for return to work  • Patient will report ability to sit for 2 hours without increased pain for improved function with travel  • Patient functional outcome measure test, NIH-CPSI improved score by >50 %  • Patient will be able to stand x 1 hour without increased pain to allow for return to work  • Patient will be independent with HEP      Recommendations: Discharge      PT Signature: Solo Tovar PT      Based upon review of the patient's progress and continued therapy plan, it is my medical opinion that Jeffery Olivas should discharge from physical therapy treatment at [facility] PHYSICAL THERAPY  [address] [place] [id number] [phone].    Signature: __________________________________      0955/1005  Manual Therapy:    0     mins  93357;  Therapeutic Exercise:    0     mins  93510;     Neuromuscular Stephanie:    0    mins  61674;    Therapeutic Activity:     10     mins  63734;     Gait Trainin     mins  61504;     Ultrasound:     0     mins  72162;    Electrical Stimulation:    0     mins  86567 ( );  Dry Needling     0     mins self-pay    Timed Treatment:   10   mins   Total Treatment:     10   mins

## 2023-04-13 ENCOUNTER — OFFICE VISIT (OUTPATIENT)
Dept: BEHAVIORAL HEALTH | Facility: CLINIC | Age: 53
End: 2023-04-13
Payer: COMMERCIAL

## 2023-04-13 DIAGNOSIS — F32.1 CURRENT MODERATE EPISODE OF MAJOR DEPRESSIVE DISORDER, UNSPECIFIED WHETHER RECURRENT: ICD-10-CM

## 2023-04-13 DIAGNOSIS — F41.1 GENERALIZED ANXIETY DISORDER: Primary | ICD-10-CM

## 2023-04-13 PROCEDURE — 90834 PSYTX W PT 45 MINUTES: CPT | Performed by: SOCIAL WORKER

## 2023-04-13 NOTE — PROGRESS NOTES
[facility] [place]                 Follow Up Adult      Follow Up Adult Note     Date:2023   Patient Name: Jeffery Olivas  : 1970   MRN: [id number]   Time IN: 8:57 am   Time OUT: 9:42 am    Referring Provider: Tonia Webb MD    Chief Complaint:      ICD-10-CM ICD-9-CM   1. Generalized anxiety disorder  F41.1 300.02   2. Current moderate episode of major depressive disorder, unspecified whether recurrent  F32.1 296.22        History of Present Illness:   Jeffery Olivas is a 53 y.o. male who is being seen today for follow up counseling for anxiety and depression.    Subjective               Patient's Support Network Includes: extended family    Functional Status: Moderate impairment       Current Outpatient Medications:   •  gabapentin (NEURONTIN) 300 MG capsule, Take 1 capsule by mouth Daily., Disp: , Rfl:   •  Multiple Vitamins-Minerals (MULTIVITAMIN ADULT EXTRA C PO), multivitamin, Disp: , Rfl:   •  nortriptyline (PAMELOR) 10 MG capsule, Take 1 capsule by mouth Every Night., Disp: 30 capsule, Rfl: 1  •  omeprazole (priLOSEC) 40 MG capsule, Take 1 capsule by mouth Daily., Disp: , Rfl:   •  ondansetron ODT (ZOFRAN-ODT) 4 MG disintegrating tablet, Place 1 tablet on the tongue Every 8 (Eight) Hours As Needed for Nausea or Vomiting., Disp: 15 tablet, Rfl: 0    Current Facility-Administered Medications:   •  ondansetron (ZOFRAN) tablet 4 mg, 4 mg, Oral, Once, Tonia Webb MD    Allergies   Allergen Reactions   • Gabapentin Nausea And Vomiting   • Codeine GI Intolerance     As a young child       Objective     Physical Exam:  Vital Signs: There were no vitals filed for this visit.  There is no height or weight on file to calculate BMI.     Mental Status Exam:   Hygiene:   good  Cooperation:  Cooperative  Eye Contact:  Good  Psychomotor Behavior:  Slow  Affect:  Full range  Mood: depressed  Speech:  Normal  Thought Process:  Goal  directed  Thought Content:  Normal  Suicidal:  None  Homicidal:  None  Hallucinations:  None  Delusion:  None  Memory:  Intact  Orientation:  Grossly intact  Reliability:  good  Insight:  Good  Judgement:  Good  Impulse Control:  Good  Physical/Medical Issues:  Chronic pain in the abdominal and testicular area.     Assessment / Plan      Diagnosis:  Diagnoses and all orders for this visit:    1. Generalized anxiety disorder (Primary)    2. Current moderate episode of major depressive disorder, unspecified whether recurrent    Patient presented for follow-up with clinician.  Patient reported little or no improvement in the area of their chronic pain.  Patient and clinician processed patient's feelings of isolation due to their chronic pain.  Clinician utilized active listening and reflective response to convey empathy and support.  Patient was active participant in session.    Progress toward goal: Patient has made minimal progress in the area of management of symptoms of depression and anxiety.    Prognosis: Prognosis for patient is fair with continued outpatient behavioral health services coordinated with the assistance of other medical professionals.    PLAN:  Clinician and patient will continue to utilize active listening, and a cognitive behavioral intervention which focuses on identifying and addressing maladaptive thought patterns related to patient anxiety and depression.    1. Safety: No acute safety concerns  2. Risk Assessment: Risk of self-harm acutely is low. Risk of self-harm chronically is also low, but could be further elevated in the event of treatment noncompliance and/or AODA.    Treatment Plan/Goals: Continue supportive psychotherapy efforts and medications as indicated. Treatment and medication options discussed during today's visit. Patient ackowledged and verbally consented to continue with current treatment plan and was educated on the importance of compliance with treatment and follow-up  appointments. Patient seems reasonably able to adhere to treatment plan.      Assisted Patient in processing above session content; acknowledged and normalized patient’s thoughts, feelings, and concerns.  Rationalized patient thought process regarding anxiety and depression.      Allowed Patient to freely discuss issues  without interruption or judgement with unconditional positive regard, active listening skills, and empathy. Therapist provided a safe, confidential environment to facilitate the development of a positive therapeutic relationship and encouraged open, honest communication. Assisted Patient in identifying risk factors which would indicate the need for higher level of care including thoughts to harm self or others and/or self-harming behavior and encouraged Patient to contact this office, call 911, or present to the nearest emergency room should any of these events occur. Discussed crisis intervention services and means to access. Patient adamantly and convincingly denies current suicidal or homicidal ideation or perceptual disturbance. Assisted Patient in processing session content; acknowledged and normalized Patient’s thoughts, feelings, and concerns by utilizing a person-centered approach in efforts to build appropriate rapport and a positive therapeutic relationship with open and honest communication. .     Follow Up:   No follow-ups on file.    Dionisio Luciano LCSW

## 2023-04-25 ENCOUNTER — OFFICE VISIT (OUTPATIENT)
Dept: INTERNAL MEDICINE | Facility: CLINIC | Age: 53
End: 2023-04-25
Payer: COMMERCIAL

## 2023-04-25 VITALS
WEIGHT: 293 LBS | HEIGHT: 75 IN | DIASTOLIC BLOOD PRESSURE: 84 MMHG | OXYGEN SATURATION: 98 % | SYSTOLIC BLOOD PRESSURE: 126 MMHG | BODY MASS INDEX: 36.43 KG/M2 | TEMPERATURE: 97.5 F | HEART RATE: 75 BPM

## 2023-04-25 DIAGNOSIS — F32.2 SEVERE DEPRESSION: ICD-10-CM

## 2023-04-25 DIAGNOSIS — R10.2 CHRONIC PELVIC PAIN IN MALE: ICD-10-CM

## 2023-04-25 DIAGNOSIS — G89.29 CHRONIC PELVIC PAIN IN MALE: ICD-10-CM

## 2023-04-25 PROCEDURE — 99214 OFFICE O/P EST MOD 30 MIN: CPT | Performed by: INTERNAL MEDICINE

## 2023-04-25 RX ORDER — OMEPRAZOLE 20 MG/1
20 CAPSULE, DELAYED RELEASE ORAL DAILY PRN
COMMUNITY

## 2023-04-25 RX ORDER — NORTRIPTYLINE HYDROCHLORIDE 25 MG/1
25 CAPSULE ORAL NIGHTLY
Qty: 30 CAPSULE | Refills: 2 | Status: SHIPPED | OUTPATIENT
Start: 2023-04-25

## 2023-04-25 NOTE — PROGRESS NOTES
Internal Medicine Follow Up    Chief Complaint  Jeffery Olivas is a 53 y.o. male who presents today for follow up of chronic medical conditions outlined below.    Chief Complaint   Patient presents with   • follow up pelvic pain        HPI  Mr. Olivas comes in today to follow up on pelvic pain. Has been getting acupuncture with electrical stimulation twice weekly x2 weeks. Had his 5th visit today. Pain is exacerbated by the procedure and he has not yet seen relief. He has 5 more visits planned with last scheduled 5/16. He is also discussing injections and deep tissue massage. He is very hesitant to do deep tissue massage and is opting to defer injections until after acupuncture is complete. He is interested in increasing nortriptyline. He is off gabapentin and unless pain is exacerbated he is not experiencing any nausea.       Review of Systems  Review of Systems   Constitutional: Positive for fatigue.   Gastrointestinal: Negative for constipation and diarrhea.   Genitourinary: Positive for testicular pain.        +pelvic pain   Psychiatric/Behavioral: Positive for dysphoric mood, depressed mood and stress. Negative for sleep disturbance.        Current Medications  Current Outpatient Medications on File Prior to Visit   Medication Sig Dispense Refill   • Multiple Vitamins-Minerals (MULTIVITAMIN ADULT EXTRA C PO) multivitamin     • [DISCONTINUED] gabapentin (NEURONTIN) 300 MG capsule Take 1 capsule by mouth Daily.     • [DISCONTINUED] nortriptyline (PAMELOR) 10 MG capsule Take 1 capsule by mouth Every Night. 30 capsule 1   • [DISCONTINUED] omeprazole (priLOSEC) 40 MG capsule Take 1 capsule by mouth Daily.     • [DISCONTINUED] ondansetron ODT (ZOFRAN-ODT) 4 MG disintegrating tablet Place 1 tablet on the tongue Every 8 (Eight) Hours As Needed for Nausea or Vomiting. 15 tablet 0   • omeprazole (priLOSEC) 20 MG capsule Take 1 capsule by mouth Daily As Needed. OTC PRN       Current Facility-Administered Medications on  "File Prior to Visit   Medication Dose Route Frequency Provider Last Rate Last Admin   • [DISCONTINUED] ondansetron (ZOFRAN) tablet 4 mg  4 mg Oral Once Tonia Webb MD           Allergies  Allergies   Allergen Reactions   • Gabapentin Nausea And Vomiting   • Codeine GI Intolerance     As a young child       Objective  Visit Vitals  /84   Pulse 75   Temp 97.5 °F (36.4 °C)   Ht 190.5 cm (75\")   Wt 133 kg (293 lb)   SpO2 98%   BMI 36.62 kg/m²        Physical Exam  Physical Exam  Vitals and nursing note reviewed.   Constitutional:       General: He is not in acute distress.     Appearance: He is well-developed.      Comments: Sitting in chair, reclined back   HENT:      Head: Normocephalic and atraumatic.   Eyes:      Conjunctiva/sclera: Conjunctivae normal.   Pulmonary:      Effort: Pulmonary effort is normal. No respiratory distress.   Skin:     General: Skin is warm and dry.   Neurological:      Mental Status: He is alert and oriented to person, place, and time.   Psychiatric:         Mood and Affect: Mood is depressed.         Results  Results for orders placed or performed in visit on 04/29/22   POC Glycosylated Hemoglobin (Hb A1C)    Specimen: Blood   Result Value Ref Range    Hemoglobin A1C 8.0 %    Lot Number [id number]     Expiration Date 1/17/24         Assessment and Plan  Diagnoses and all orders for this visit:    Chronic pelvic pain in male  - 2 years of pelvic pain affecting every aspect of his life. Has filed for disability.  - no significant improvement with pelvic floor PT, treatment for prostatitis with rounds of abx and antiinflammatories, surgical repair of inguinal and umbilical hernias, L inferior hypogastric plexus block, ganglion impar block  - CT abd/pelvis w and wo contrast previously negative. MRI L spine negative and pelvis with possible T2 hyperintensity of pudendal nerve but question if this is expected vascular structures.  - continues to see [facility]  - receiving acupuncture with electrical stimulation. Plan for further injections to isolate potential site of pain.  - on nortriptyline, will increase to 25mg qhs  - Lyrica may be alternative option he can discuss with [facility].    Severe depression  - stable, on nortriptyline with plan to increase dose  - did not tolerate cymbalta  - continues counseling    Return in about 17 days (around 5/12/2023) for please schedule at 4:15 for follow up 30 minutes.  Answers for HPI/ROS submitted by the patient on 4/18/2023  What is the primary reason for your visit?: Other  Please describe your symptoms.: Follow-up  Have you had these symptoms before?: Yes  How long have you been having these symptoms?: Greater than 2 weeks  Please list any medications you are currently taking for this condition.: Please see My Chart  Please describe any probable cause for these symptoms. : Please see My Chart

## 2023-04-27 ENCOUNTER — OFFICE VISIT (OUTPATIENT)
Dept: BEHAVIORAL HEALTH | Facility: CLINIC | Age: 53
End: 2023-04-27
Payer: COMMERCIAL

## 2023-04-27 DIAGNOSIS — F41.1 GENERALIZED ANXIETY DISORDER: Primary | ICD-10-CM

## 2023-04-27 DIAGNOSIS — F33.1 MODERATE EPISODE OF RECURRENT MAJOR DEPRESSIVE DISORDER: ICD-10-CM

## 2023-04-27 NOTE — PROGRESS NOTES
[facility] [place]                 Follow Up Adult      Follow Up Adult Note     Date:2023   Patient Name: Jeffery Olivas  : 1970   MRN: [id number]   Time IN: 9:04 am   Time OUT: 9:57 am    Referring Provider: Tonia Webb MD    Chief Complaint:      ICD-10-CM ICD-9-CM   1. Generalized anxiety disorder  F41.1 300.02   2. Moderate episode of recurrent major depressive disorder  F33.1 296.32        History of Present Illness:   Jeffery Olivas is a 53 y.o. male who is being seen today for follow up counseling for anxiety and depression.    Subjective               Patient's Support Network Includes: extended family    Functional Status: Severe impairment      Current Outpatient Medications:   •  Multiple Vitamins-Minerals (MULTIVITAMIN ADULT EXTRA C PO), multivitamin, Disp: , Rfl:   •  nortriptyline (PAMELOR) 25 MG capsule, Take 1 capsule by mouth Every Night., Disp: 30 capsule, Rfl: 2  •  omeprazole (priLOSEC) 20 MG capsule, Take 1 capsule by mouth Daily As Needed. OTC PRN, Disp: , Rfl:     Allergies   Allergen Reactions   • Gabapentin Nausea And Vomiting   • Codeine GI Intolerance     As a young child       Objective     Physical Exam:  Vital Signs: There were no vitals filed for this visit.  There is no height or weight on file to calculate BMI.     Mental Status Exam:   Hygiene:   good  Cooperation:  Cooperative  Eye Contact:  Good  Psychomotor Behavior:  Appropriate  Affect:  Full range  Mood: depressed  Speech:  Normal  Thought Process:  Goal directed  Thought Content:  Normal  Suicidal:  None  Homicidal:  None  Hallucinations:  None  Delusion:  None  Memory:  Intact  Orientation:  Grossly intact  Reliability:  good  Insight:  Good  Judgement:  Good  Impulse Control:  Good  Physical/Medical Issues: Chronic pelvic pain.    Assessment / Plan      Diagnosis:  Diagnoses and all orders for this visit:    1. Generalized anxiety disorder  (Primary)    2. Moderate episode of recurrent major depressive disorder        Progress toward goal: Patient has made marginal progress towards the long-term goal of independent management of symptoms of depression and anxiety.  Chronic pain has made patient progress difficult.    Prognosis: Prognosis for patient is dependent upon Future management of patient's pain.    PLAN:  Clinician and patient will continue to utilize a cognitive behavioral intervention which identifies and addresses maladaptive thought patterns related to patient depression and anxiety.  Follow-ups will occur approximately every 14 days and will last from 40 to 60 minutes in duration.    1. Safety: No acute safety concerns  2. Risk Assessment: Risk of self-harm acutely is low. Risk of self-harm chronically is also low, but could be further elevated in the event of treatment noncompliance and/or AODA.    Treatment Plan/Goals: Continue supportive psychotherapy efforts and medications as indicated. Treatment and medication options discussed during today's visit. Patient ackowledged and verbally consented to continue with current treatment plan and was educated on the importance of compliance with treatment and follow-up appointments. Patient seems reasonably able to adhere to treatment plan.      Assisted Patient in processing above session content; acknowledged and normalized patient’s thoughts, feelings, and concerns.  Rationalized patient thought process regarding anxiety and depression.      Allowed Patient to freely discuss issues  without interruption or judgement with unconditional positive regard, active listening skills, and empathy. Therapist provided a safe, confidential environment to facilitate the development of a positive therapeutic relationship and encouraged open, honest communication. Assisted Patient in identifying risk factors which would indicate the need for higher level of care including thoughts to harm self or others  and/or self-harming behavior and encouraged Patient to contact this office, call 911, or present to the nearest emergency room should any of these events occur. Discussed crisis intervention services and means to access. Patient adamantly and convincingly denies current suicidal or homicidal ideation or perceptual disturbance. Assisted Patient in processing session content; acknowledged and normalized Patient’s thoughts, feelings, and concerns by utilizing a person-centered approach in efforts to build appropriate rapport and a positive therapeutic relationship with open and honest communication. .     Follow Up:   Return in about 2 weeks (around 5/11/2023).    Dionisio Luciano LCSW

## 2023-05-11 ENCOUNTER — OFFICE VISIT (OUTPATIENT)
Dept: BEHAVIORAL HEALTH | Facility: CLINIC | Age: 53
End: 2023-05-11
Payer: COMMERCIAL

## 2023-05-11 DIAGNOSIS — F41.1 GENERALIZED ANXIETY DISORDER: Primary | ICD-10-CM

## 2023-05-11 DIAGNOSIS — F32.1 CURRENT MODERATE EPISODE OF MAJOR DEPRESSIVE DISORDER, UNSPECIFIED WHETHER RECURRENT: ICD-10-CM

## 2023-05-11 NOTE — PROGRESS NOTES
[facility] [place]                 Follow Up Adult      Follow Up Adult Note     Date:2023   Patient Name: Jeffery Olivas  : 1970   MRN: [id number]   Time IN: 8:59 am   Time OUT: 9:30 am    Referring Provider: Tonia Webb MD    Chief Complaint:      ICD-10-CM ICD-9-CM   1. Generalized anxiety disorder  F41.1 300.02   2. Current moderate episode of major depressive disorder, unspecified whether recurrent  F32.1 296.22        History of Present Illness:   Jeffery Olivas is a 53 y.o. male who is being seen today for follow up counseling for anxiety and depression.    Subjective               Patient's Support Network Includes: extended family    Functional Status: Severe impairment      Current Outpatient Medications:   •  Multiple Vitamins-Minerals (MULTIVITAMIN ADULT EXTRA C PO), multivitamin, Disp: , Rfl:   •  nortriptyline (PAMELOR) 25 MG capsule, Take 1 capsule by mouth Every Night., Disp: 30 capsule, Rfl: 2  •  omeprazole (priLOSEC) 20 MG capsule, Take 1 capsule by mouth Daily As Needed. OTC PRN, Disp: , Rfl:     Allergies   Allergen Reactions   • Gabapentin Nausea And Vomiting   • Codeine GI Intolerance     As a young child       Objective     Physical Exam:  Vital Signs: There were no vitals filed for this visit.  There is no height or weight on file to calculate BMI.     Mental Status Exam:   Hygiene:   good  Cooperation:  Cooperative  Eye Contact:  Good  Psychomotor Behavior:  Appropriate  Affect:  Full range  Mood: normal  Speech:  Normal  Thought Process:  Goal directed  Thought Content:  Normal  Suicidal:  None  Homicidal:  None  Hallucinations:  None  Delusion:  None  Memory:  Intact  Orientation:  Grossly intact  Reliability:  good  Insight:  Good  Judgement:  Good  Impulse Control:  Good  Physical/Medical Issues:  Chronic pain in the groin and abdominal area.     Assessment / Plan      Diagnosis:  Diagnoses and all orders for this  "visit:    1. Generalized anxiety disorder (Primary)    2. Current moderate episode of major depressive disorder, unspecified whether recurrent    Patient presented for follow-up with clinician.  Patient reports continuing with acupuncture and pain mapping services.  Patient reports little or no progress in the areas of pain management or determination of the origin of the chronic pain.  Patient continues to struggle with isolation and a feeling of \"not being the person he was\".  Patient struggles with identity in regard to the loss of the role as a provider and active participant in their family's lives.  Patient expresses anxiety and worry that living with chronic pain will be a lifelong circumstance.  Clinician utilized active listening and reflective response to convey empathy and support.  Patient was an active participant in session.    Progress toward goal: Patient continues to make incremental progress towards the long-term goal of independent management of symptoms of anxiety and depression.    Prognosis: Prognosis for patient is dependent upon improvement in physical symptoms of chronic pain.  Patient has shown overall progress through the implementation of coping mechanisms learned in psychotherapy.    PLAN:  Patient and clinician will continue to utilize a cognitive behavioral approach which identifies and addresses patient maladaptive thought patterns related to depression and anxiety.  Follow-ups will occur approximately every 14 days and will last from 40 to 60 minutes in duration.    1. Safety: No acute safety concerns  2. Risk Assessment: Risk of self-harm acutely is low. Risk of self-harm chronically is also low, but could be further elevated in the event of treatment noncompliance and/or AODA.    Treatment Plan/Goals: Continue supportive psychotherapy efforts and medications as indicated. Treatment and medication options discussed during today's visit. Patient ackowledged and verbally consented to " continue with current treatment plan and was educated on the importance of compliance with treatment and follow-up appointments. Patient seems reasonably able to adhere to treatment plan.      Assisted Patient in processing above session content; acknowledged and normalized patient’s thoughts, feelings, and concerns.  Rationalized patient thought process regarding anxiety and depression.      Allowed Patient to freely discuss issues  without interruption or judgement with unconditional positive regard, active listening skills, and empathy. Therapist provided a safe, confidential environment to facilitate the development of a positive therapeutic relationship and encouraged open, honest communication. Assisted Patient in identifying risk factors which would indicate the need for higher level of care including thoughts to harm self or others and/or self-harming behavior and encouraged Patient to contact this office, call 911, or present to the nearest emergency room should any of these events occur. Discussed crisis intervention services and means to access. Patient adamantly and convincingly denies current suicidal or homicidal ideation or perceptual disturbance. Assisted Patient in processing session content; acknowledged and normalized Patient’s thoughts, feelings, and concerns by utilizing a person-centered approach in efforts to build appropriate rapport and a positive therapeutic relationship with open and honest communication. .     Follow Up:   Return in about 2 weeks (around 5/25/2023).    Dionisio Luciano LCSW

## 2023-05-12 ENCOUNTER — OFFICE VISIT (OUTPATIENT)
Dept: INTERNAL MEDICINE | Facility: CLINIC | Age: 53
End: 2023-05-12
Payer: COMMERCIAL

## 2023-05-12 VITALS
WEIGHT: 295 LBS | OXYGEN SATURATION: 96 % | BODY MASS INDEX: 36.68 KG/M2 | SYSTOLIC BLOOD PRESSURE: 128 MMHG | DIASTOLIC BLOOD PRESSURE: 76 MMHG | HEIGHT: 75 IN | TEMPERATURE: 97.1 F | HEART RATE: 88 BPM

## 2023-05-12 DIAGNOSIS — G89.29 CHRONIC PELVIC PAIN IN MALE: Primary | ICD-10-CM

## 2023-05-12 DIAGNOSIS — R10.2 CHRONIC PELVIC PAIN IN MALE: Primary | ICD-10-CM

## 2023-05-12 DIAGNOSIS — Z23 NEED FOR HEPATITIS B VACCINATION: ICD-10-CM

## 2023-05-12 NOTE — PROGRESS NOTES
Internal Medicine Follow Up    Chief Complaint  Jeffery Olivas is a 53 y.o. male who presents today for follow up of chronic medical conditions outlined below.    Chief Complaint   Patient presents with   • Chronic pelvic pain in male        HPI  Mr. Olivas comes in today to follow up on pelvic pain. Has received 7 out of 8 electroacupuncture treatments and last is planned for next Tuesday. He did get short lived 15-20% relief from pain after the last visit. He will start a series of injections weekly after this. He is also going to be discussing an exercise plan. He remains on nortriptyline and has been on 25mg nightly for 10 days. He has been sleeping well but is somewhat drowsy during the day. He has noted that he no longer has the very bad days he was having before and is not certain if this is the medication or the other interventions. He has met with a counselor who specializes in chronic pain and is not certain if he will transition care there or remain with [facility].       Review of Systems  Review of Systems   Constitutional: Positive for fatigue.   Gastrointestinal: Negative for constipation and diarrhea.   Genitourinary: Positive for testicular pain.        +pelvic pain   Psychiatric/Behavioral: Positive for dysphoric mood, depressed mood and stress. Negative for sleep disturbance.        Current Medications  Current Outpatient Medications on File Prior to Visit   Medication Sig Dispense Refill   • Multiple Vitamins-Minerals (MULTIVITAMIN ADULT EXTRA C PO) multivitamin     • nortriptyline (PAMELOR) 25 MG capsule Take 1 capsule by mouth Every Night. 30 capsule 2   • omeprazole (priLOSEC) 20 MG capsule Take 1 capsule by mouth Daily As Needed. OTC PRN       No current facility-administered medications on file prior to visit.       Allergies  Allergies   Allergen Reactions   • Gabapentin Nausea And Vomiting   • Codeine GI Intolerance     As a young child       Objective  Visit Vitals  /76   Pulse 88  "  Temp 97.1 °F (36.2 °C)   Ht 190.5 cm (75\")   Wt 134 kg (295 lb)   SpO2 96%   BMI 36.87 kg/m²        Physical Exam  Physical Exam  Vitals and nursing note reviewed.   Constitutional:       General: He is not in acute distress.     Appearance: He is well-developed. He is not ill-appearing.      Comments: Sitting in chair, reclined back   HENT:      Head: Normocephalic and atraumatic.   Eyes:      Conjunctiva/sclera: Conjunctivae normal.   Pulmonary:      Effort: Pulmonary effort is normal. No respiratory distress.   Skin:     General: Skin is warm and dry.   Neurological:      Mental Status: He is alert and oriented to person, place, and time.   Psychiatric:         Mood and Affect: Mood and affect normal.         Behavior: Behavior normal.         Results  Results for orders placed or performed in visit on 04/29/22   POC Glycosylated Hemoglobin (Hb A1C)    Specimen: Blood   Result Value Ref Range    Hemoglobin A1C 8.0 %    Lot Number [id number]     Expiration Date 1/17/24         Assessment and Plan  Diagnoses and all orders for this visit:    Chronic pelvic pain in male  - 2 years of pelvic pain affecting every aspect of his life. Has filed for disability.  - no significant improvement with pelvic floor PT, treatment for prostatitis with rounds of abx and antiinflammatories, surgical repair of inguinal and umbilical hernias, L inferior hypogastric plexus block, ganglion impar block  - CT abd/pelvis w and wo contrast previously negative. MRI L spine negative and pelvis with possible T2 hyperintensity of pudendal nerve but question if this is expected vascular structures.  - continues to see [facility]  - receiving electroacupuncture. Plan for further injections to isolate potential site of pain.  - on nortriptyline 25mg qhs which will be continued  - Lyrica may be alternative option he can discuss with Liyah.    Need for hepatitis B vaccination  -     Hepatitis B Vaccine Adult IM " (ENERGIX/RECOMBIVAX)         Return in about 5 weeks (around 6/16/2023).

## 2023-05-25 ENCOUNTER — OFFICE VISIT (OUTPATIENT)
Dept: BEHAVIORAL HEALTH | Facility: CLINIC | Age: 53
End: 2023-05-25
Payer: COMMERCIAL

## 2023-05-25 DIAGNOSIS — F32.1 CURRENT MODERATE EPISODE OF MAJOR DEPRESSIVE DISORDER, UNSPECIFIED WHETHER RECURRENT: ICD-10-CM

## 2023-05-25 DIAGNOSIS — F41.1 GENERALIZED ANXIETY DISORDER: Primary | ICD-10-CM

## 2023-05-25 NOTE — PROGRESS NOTES
[facility]                 Follow Up Adult      Follow Up Adult Note     Date:2023   Patient Name: Jeffery Olivas  : 1970   MRN: [id number]   Time IN: 9:04 am Time OUT: 9:33 am    Referring Provider: Tonia Webb MD    Chief Complaint:      ICD-10-CM ICD-9-CM   1. Generalized anxiety disorder  F41.1 300.02   2. Current moderate episode of major depressive disorder, unspecified whether recurrent  F32.1 296.22        History of Present Illness:   Jeffery Olivas is a 53 y.o. male who is being seen today for follow up counseling for anxiety and depression.    Subjective               Patient's Support Network Includes: extended family    Functional Status: Mild impairment       Current Outpatient Medications:   •  Multiple Vitamins-Minerals (MULTIVITAMIN ADULT EXTRA C PO), multivitamin, Disp: , Rfl:   •  nortriptyline (PAMELOR) 25 MG capsule, Take 1 capsule by mouth Every Night., Disp: 30 capsule, Rfl: 2  •  omeprazole (priLOSEC) 20 MG capsule, Take 1 capsule by mouth Daily As Needed. OTC PRN, Disp: , Rfl:     Allergies   Allergen Reactions   • Gabapentin Nausea And Vomiting   • Codeine GI Intolerance     As a young child       Objective     Physical Exam:  Vital Signs: There were no vitals filed for this visit.  There is no height or weight on file to calculate BMI.     Mental Status Exam:   Hygiene:   good  Cooperation:  Cooperative  Eye Contact:  Good  Psychomotor Behavior:  Appropriate  Affect:  Full range  Mood: normal  Speech:  Normal  Thought Process:  Goal directed  Thought Content:  Normal  Suicidal:  None  Homicidal:  None  Hallucinations:  None  Delusion:  None  Memory:  Intact  Orientation:  Grossly intact  Reliability:  good  Insight:  Good  Judgement:  Good  Impulse Control:  Good  Physical/Medical Issues:  Chronic pain in the groin and abdomen area.     Assessment / Plan      Diagnosis:  Diagnoses and all orders for this  visit:    1. Generalized anxiety disorder (Primary)    2. Current moderate episode of major depressive disorder, unspecified whether recurrent    Patient presented for follow-up with clinician.  Patient and clinician processed patient's stress and anxiety over the continued course of treatment for chronic pain in the groin and abdomen area.  Patient identified their chronic medical condition along with financial strain caused by their inability to financially provide for their family as well as a prolonged period of waiting for a disability hearing as their primary triggers.  Clinician utilized active listening and reflective response to convey empathy and support.  Patient was receptive to intervention.    Progress toward goal: Patient continues to make incremental progress in the area of management of symptoms of depression and anxiety.    Prognosis: Prognosis for patient is fair and is largely dependent on their future course of medical treatment.    PLAN:  Patient and clinician will continue to utilize a cognitive behavioral intervention which identifies and addresses patient maladaptive thought patterns related to anxiety and depression.  Follow-ups will occur approximately every 14 days and will last from 45 to 60 minutes in duration.    1. Safety: No acute safety concerns  2. Risk Assessment: Risk of self-harm acutely is low. Risk of self-harm chronically is also low, but could be further elevated in the event of treatment noncompliance and/or AODA.    Treatment Plan/Goals: Continue supportive psychotherapy efforts and medications as indicated. Treatment and medication options discussed during today's visit. Patient ackowledged and verbally consented to continue with current treatment plan and was educated on the importance of compliance with treatment and follow-up appointments. Patient seems reasonably able to adhere to treatment plan.      Assisted Patient in processing above session content; acknowledged  and normalized patient’s thoughts, feelings, and concerns.  Rationalized patient thought process regarding depression and anxiety.      Allowed Patient to freely discuss issues  without interruption or judgement with unconditional positive regard, active listening skills, and empathy. Therapist provided a safe, confidential environment to facilitate the development of a positive therapeutic relationship and encouraged open, honest communication. Assisted Patient in identifying risk factors which would indicate the need for higher level of care including thoughts to harm self or others and/or self-harming behavior and encouraged Patient to contact this office, call 911, or present to the nearest emergency room should any of these events occur. Discussed crisis intervention services and means to access. Patient adamantly and convincingly denies current suicidal or homicidal ideation or perceptual disturbance. Assisted Patient in processing session content; acknowledged and normalized Patient’s thoughts, feelings, and concerns by utilizing a person-centered approach in efforts to build appropriate rapport and a positive therapeutic relationship with open and honest communication. .     Follow Up:   Return in about 2 weeks (around 6/8/2023).    Dionisio Luciano LCSW

## 2023-06-22 PROBLEM — Z82.0 FAMILY HISTORY OF ALZHEIMER'S DISEASE: Status: ACTIVE | Noted: 2023-06-22

## 2023-07-25 ENCOUNTER — OFFICE VISIT (OUTPATIENT)
Dept: INTERNAL MEDICINE | Facility: CLINIC | Age: 53
End: 2023-07-25
Payer: COMMERCIAL

## 2023-07-25 VITALS
DIASTOLIC BLOOD PRESSURE: 80 MMHG | WEIGHT: 290 LBS | HEIGHT: 75 IN | SYSTOLIC BLOOD PRESSURE: 142 MMHG | HEART RATE: 95 BPM | BODY MASS INDEX: 36.06 KG/M2 | OXYGEN SATURATION: 98 %

## 2023-07-25 DIAGNOSIS — G89.29 CHRONIC PELVIC PAIN IN MALE: Primary | ICD-10-CM

## 2023-07-25 DIAGNOSIS — F32.2 SEVERE DEPRESSION: ICD-10-CM

## 2023-07-25 DIAGNOSIS — E11.65 TYPE 2 DIABETES MELLITUS WITH HYPERGLYCEMIA, WITHOUT LONG-TERM CURRENT USE OF INSULIN: ICD-10-CM

## 2023-07-25 DIAGNOSIS — Z23 NEED FOR HEPATITIS B VACCINATION: ICD-10-CM

## 2023-07-25 DIAGNOSIS — R10.2 CHRONIC PELVIC PAIN IN MALE: Primary | ICD-10-CM

## 2023-07-25 PROCEDURE — 90471 IMMUNIZATION ADMIN: CPT | Performed by: INTERNAL MEDICINE

## 2023-07-25 PROCEDURE — 99214 OFFICE O/P EST MOD 30 MIN: CPT | Performed by: INTERNAL MEDICINE

## 2023-07-25 PROCEDURE — 90746 HEPB VACCINE 3 DOSE ADULT IM: CPT | Performed by: INTERNAL MEDICINE

## 2023-07-25 RX ORDER — BUPROPION HYDROCHLORIDE 150 MG/1
TABLET ORAL
COMMUNITY
Start: 2023-06-29

## 2023-07-25 NOTE — PROGRESS NOTES
Internal Medicine Follow Up    Chief Complaint  Jeffery Olivas is a 53 y.o. male who presents today for follow up of chronic medical conditions outlined below.    Chief Complaint   Patient presents with    Hyperlipidemia    Diabetes        HPI  Mr. Olivas comes in today for follow up. Continues to follow with [facility] for both pain management and behavioral health. He did not tolerate pristiq. He is now on wellbutrin x3.5-4 weeks. Taking nightly and has had interruption of sleep. He has appt this week and will discuss how to move forward. In therapy every 2 weeks. Notes last session was difficult. He reports that they are working on acceptance of his current condition. He struggles with not knowing cause of his pain. He has continued injections to pelvis. He had mild to moderate relief with some of the injections. Pain remains predominantly in L pelvis and radiates to L testicle. Does have some pain in R pelvis that was unmasked by one of the injections. Has two upcoming injections in August and then will be resuming electroacupuncture. He reports ultrasound that showed several areas of concern. Cannot elaborate or find more information on patient portal. He does report that this is what lead to having two additional injections       Review of Systems  Review of Systems   Constitutional:  Positive for fatigue.   Genitourinary:  Positive for testicular pain.        +pelvic pain   Psychiatric/Behavioral:  Positive for dysphoric mood, depressed mood and stress. Negative for sleep disturbance.       Current Medications  Current Outpatient Medications on File Prior to Visit   Medication Sig Dispense Refill    buPROPion XL (WELLBUTRIN XL) 150 MG 24 hr tablet       Multiple Vitamins-Minerals (MULTIVITAMIN ADULT EXTRA C PO) multivitamin      [DISCONTINUED] Desvenlafaxine Succinate ER 25 MG tablet sustained-release 24 hour        No current facility-administered medications on file prior to visit.  "      Allergies  Allergies   Allergen Reactions    Gabapentin Nausea And Vomiting    Codeine GI Intolerance     As a young child       Objective  Visit Vitals  /80   Pulse 95   Ht 190.5 cm (75\")   Wt 132 kg (290 lb)   SpO2 98%   BMI 36.25 kg/m²        Physical Exam  Physical Exam  Vitals and nursing note reviewed.   Constitutional:       General: He is not in acute distress.     Appearance: He is well-developed. He is obese.      Comments: Sitting in chair, reclined back   HENT:      Head: Normocephalic and atraumatic.   Eyes:      Conjunctiva/sclera: Conjunctivae normal.   Pulmonary:      Effort: Pulmonary effort is normal. No respiratory distress.   Skin:     General: Skin is warm and dry.   Neurological:      Mental Status: He is alert and oriented to person, place, and time.   Psychiatric:         Mood and Affect: Affect normal. Mood is depressed.         Behavior: Behavior normal.       Results  Results for orders placed or performed in visit on 04/29/22   POC Glycosylated Hemoglobin (Hb A1C)    Specimen: Blood   Result Value Ref Range    Hemoglobin A1C 8.0 %    Lot Number [id number]     Expiration Date 1/17/24         Assessment and Plan  Diagnoses and all orders for this visit:    Chronic pelvic pain in male  - 2 years of pelvic pain affecting every aspect of his life. Has filed for disability.  - no significant improvement with pelvic floor PT, treatment for prostatitis with rounds of abx and antiinflammatories, surgical repair of inguinal and umbilical hernias, L inferior hypogastric plexus block, ganglion impar block. Some slight improvement after electroacupuncture.   - CT abd/pelvis w and wo contrast previously negative. MRI L spine negative and pelvis with possible T2 hyperintensity of pudendal nerve but question if this is expected vascular structures. Notes recent US with abnormal soft tissue findings. Will need to get these records.  - continues to see [facility]  - plan for " further injections to areas identified on US as well as 8 weeks of electroacupuncture.     Severe depression  - stable, now on wellbutrin after intolerance to pristiq. Having difficulty sleeping and taking qhs. Advised that he would need to switch dosing to qAM but he defers until he discusses with psychiatry this week.  - also in counseling every other week  - notably did not tolerate cymbalta and nortriptyline ineffective     Type 2 diabetes mellitus with hyperglycemia, without long-term current use of insulin  - has had A1c up to 8 4/2022  - this has not been well managed up until now  - declines A1c    Need for hepatitis B vaccination  -     Hepatitis B Vaccine Adult IM (ENERGIX/RECOMBIVAX)         Return in about 8 weeks (around 9/19/2023) for Follow up 30 minutes.

## 2023-09-19 ENCOUNTER — OFFICE VISIT (OUTPATIENT)
Dept: INTERNAL MEDICINE | Facility: CLINIC | Age: 53
End: 2023-09-19
Payer: COMMERCIAL

## 2023-09-19 VITALS
BODY MASS INDEX: 35.68 KG/M2 | WEIGHT: 287 LBS | HEIGHT: 75 IN | TEMPERATURE: 97 F | DIASTOLIC BLOOD PRESSURE: 68 MMHG | OXYGEN SATURATION: 98 % | SYSTOLIC BLOOD PRESSURE: 122 MMHG | HEART RATE: 86 BPM

## 2023-09-19 DIAGNOSIS — F32.2 SEVERE DEPRESSION: ICD-10-CM

## 2023-09-19 DIAGNOSIS — Z23 NEED FOR INFLUENZA VACCINATION: ICD-10-CM

## 2023-09-19 DIAGNOSIS — R10.2 CHRONIC PELVIC PAIN IN MALE: Primary | ICD-10-CM

## 2023-09-19 DIAGNOSIS — E11.65 TYPE 2 DIABETES MELLITUS WITH HYPERGLYCEMIA, WITHOUT LONG-TERM CURRENT USE OF INSULIN: ICD-10-CM

## 2023-09-19 DIAGNOSIS — R36.1 HEMATOSPERMIA: ICD-10-CM

## 2023-09-19 DIAGNOSIS — Z23 NEED FOR HEPATITIS A VACCINATION: ICD-10-CM

## 2023-09-19 DIAGNOSIS — G89.29 CHRONIC PELVIC PAIN IN MALE: Primary | ICD-10-CM

## 2023-09-19 RX ORDER — BUPROPION HYDROCHLORIDE 300 MG/1
TABLET ORAL
COMMUNITY
Start: 2023-08-24

## 2023-09-19 NOTE — PROGRESS NOTES
"Internal Medicine Follow Up    Chief Complaint  Jeffery Olivas is a 53 y.o. male who presents today for follow up of chronic medical conditions outlined below.    Chief Complaint   Patient presents with    Chronic pelvic pain in male        HPI  Mr. Olivas comes in today for follow up. Has continued care with [facility] and has had several injections with short lived relief. He describes this as 20-30% reduction in pain for a few hours. He continues electro-acupuncture resulting in up to 6 hours of 30% reduction in pain. He also had several ultrasounds that have identified scar tissue, tissue damage, and possible \"pinched nerve.\" He has had intermittent L hip pain that is being addressed. He has plans for more injections and electro-acupuncture. He also has been having SI dysfunction which is being addressed with SI joint injections. He feels his SI joint dysfunction is likely a result of having to sit leaning back to alleviate pelvic pain. Taking wellbutrin but is affecting sleep negatively so plan to discuss alternative with prescriber this week. He notes hematospermia and reduced ejaculate. Interested in referral to a new urologist.       Review of Systems  Review of Systems   Constitutional:  Positive for fatigue.   Genitourinary:  Positive for testicular pain.        +pelvic pain, hematospermia, reduced ejaculate   Musculoskeletal:  Positive for arthralgias and back pain.   Psychiatric/Behavioral:  Positive for dysphoric mood, depressed mood and stress. Negative for sleep disturbance.       Current Medications  Current Outpatient Medications on File Prior to Visit   Medication Sig Dispense Refill    buPROPion XL (WELLBUTRIN XL) 300 MG 24 hr tablet       Multiple Vitamins-Minerals (MULTIVITAMIN ADULT EXTRA C PO) multivitamin      [DISCONTINUED] buPROPion XL (WELLBUTRIN XL) 150 MG 24 hr tablet        No current facility-administered medications on file prior to visit.       Allergies  Allergies   Allergen " "Reactions    Gabapentin Nausea And Vomiting    Codeine GI Intolerance     As a young child    Pristiq [Desvenlafaxine] Nausea Only       Objective  Visit Vitals  /68   Pulse 86   Temp 97 °F (36.1 °C)   Ht 190.5 cm (75\")   Wt 130 kg (287 lb)   SpO2 98%   BMI 35.87 kg/m²        Physical Exam  Physical Exam  Vitals and nursing note reviewed.   Constitutional:       General: He is not in acute distress.     Appearance: He is well-developed. He is obese.      Comments: Sitting in chair, reclined back   HENT:      Head: Normocephalic and atraumatic.   Eyes:      Conjunctiva/sclera: Conjunctivae normal.   Pulmonary:      Effort: Pulmonary effort is normal. No respiratory distress.   Skin:     General: Skin is warm and dry.   Neurological:      Mental Status: He is alert and oriented to person, place, and time.   Psychiatric:         Mood and Affect: Affect normal. Mood is depressed.         Behavior: Behavior normal.       Results  Results for orders placed or performed in visit on 04/29/22   POC Glycosylated Hemoglobin (Hb A1C)    Specimen: Blood   Result Value Ref Range    Hemoglobin A1C 8.0 %    Lot Number [id number]     Expiration Date 1/17/24         Assessment and Plan  Diagnoses and all orders for this visit:    Chronic pelvic pain in male  - 2 years of pelvic pain affecting every aspect of his life. Has filed for disability.  - no significant improvement with pelvic floor PT, treatment for prostatitis with rounds of abx and antiinflammatories, surgical repair of inguinal and umbilical hernias, L inferior hypogastric plexus block, ganglion impar block. Some slight improvement after electroacupuncture and recent injections.   - CT abd/pelvis w and wo contrast previously negative. MRI L spine negative and pelvis with possible T2 hyperintensity of pudendal nerve but question if this is expected vascular structures. Notes recent US with abnormal soft tissue findings suggestive of scar tissue and tissue damage.  - " continues to see [facility]  - plan for further injections and electroacupuncture.     Hematospermia  - will research urologists and give him my recommendations    Severe depression  - following with psychiatry and counseling at [facility]  - intolerant of pristiq, on wellbutrin but having insomnia  - has appt this week    Type 2 diabetes mellitus with hyperglycemia, without long-term current use of insulin  - has had A1c up to 8 4/2022  - this has not been well managed up until now  - declines A1c  - had eye exam 8/2023 which was negative for retinopathy      Return in about 8 weeks (around 11/14/2023) for Follow up 30 minutes.

## 2023-09-24 ENCOUNTER — PATIENT MESSAGE (OUTPATIENT)
Dept: INTERNAL MEDICINE | Facility: CLINIC | Age: 53
End: 2023-09-24

## 2023-11-17 ENCOUNTER — OFFICE VISIT (OUTPATIENT)
Dept: INTERNAL MEDICINE | Facility: CLINIC | Age: 53
End: 2023-11-17
Payer: COMMERCIAL

## 2023-11-17 VITALS
HEIGHT: 75 IN | BODY MASS INDEX: 36.18 KG/M2 | DIASTOLIC BLOOD PRESSURE: 84 MMHG | WEIGHT: 291 LBS | OXYGEN SATURATION: 97 % | SYSTOLIC BLOOD PRESSURE: 142 MMHG | HEART RATE: 84 BPM | TEMPERATURE: 97.3 F

## 2023-11-17 DIAGNOSIS — R36.1 HEMATOSPERMIA: ICD-10-CM

## 2023-11-17 DIAGNOSIS — G89.29 CHRONIC PELVIC PAIN IN MALE: Primary | ICD-10-CM

## 2023-11-17 DIAGNOSIS — R10.2 CHRONIC PELVIC PAIN IN MALE: Primary | ICD-10-CM

## 2023-11-17 DIAGNOSIS — F32.2 SEVERE DEPRESSION: ICD-10-CM

## 2023-11-17 PROCEDURE — 99213 OFFICE O/P EST LOW 20 MIN: CPT | Performed by: INTERNAL MEDICINE

## 2023-11-17 RX ORDER — DEXTROMETHORPHAN HYDROBROMIDE, BUPROPION HYDROCHLORIDE 105; 45 MG/1; MG/1
1 TABLET, MULTILAYER, EXTENDED RELEASE ORAL 2 TIMES DAILY
COMMUNITY
Start: 2023-10-20

## 2023-11-17 RX ORDER — GABAPENTIN 100 MG/1
100 CAPSULE ORAL DAILY
COMMUNITY
Start: 2023-11-08

## 2023-11-17 NOTE — PROGRESS NOTES
Internal Medicine Follow Up    Chief Complaint  Jeffery Olivas is a 53 y.o. male who presents today for follow up of chronic medical conditions outlined below.    Chief Complaint   Patient presents with    Chronic pelvic pain in male        HPI  Mr. Olivas comes in today for follow up. Has continued care with [facility] and continues electro-acupuncture. He is retrying gabapentin at a lower dose with slower taper. He is taking 100mg daily. So far only intermittent mild nausea. He is now on Auvelity which was increased recently to BID and is helping him to manage depressed moods. He has had a hearing for disability and it was felt to go well. He has not yet received a decision. He is consulting with an internist, Dr. Cowan, that recently started at [facility]. They are going to make recommendations on urologists that may offer another opinion on his urologic complaints. He declines lab evaluation today.         Review of Systems  Review of Systems   Genitourinary:  Positive for testicular pain.        +pelvic pain, hematospermia, reduced ejaculate   Musculoskeletal:  Positive for arthralgias and back pain.   Psychiatric/Behavioral:  Positive for depressed mood (improving). Negative for sleep disturbance.         Current Medications  Current Outpatient Medications on File Prior to Visit   Medication Sig Dispense Refill    Auvelity  MG tablet controlled-release Take 1 tablet by mouth 2 (Two) Times a Day.      gabapentin (NEURONTIN) 100 MG capsule Take 1 capsule by mouth Daily.      Multiple Vitamins-Minerals (MULTIVITAMIN ADULT EXTRA C PO) multivitamin      [DISCONTINUED] buPROPion XL (WELLBUTRIN XL) 300 MG 24 hr tablet        No current facility-administered medications on file prior to visit.       Allergies  Allergies   Allergen Reactions    Gabapentin Nausea And Vomiting    Codeine GI Intolerance     As a young child    Pristiq [Desvenlafaxine] Nausea Only       Objective  Visit Vitals  /84   Pulse 84  "  Temp 97.3 °F (36.3 °C)   Ht 190.5 cm (75\")   Wt 132 kg (291 lb)   SpO2 97%   BMI 36.37 kg/m²        Physical Exam  Physical Exam  Vitals and nursing note reviewed.   Constitutional:       General: He is not in acute distress.     Appearance: He is well-developed. He is obese.      Comments: Sitting in chair, reclined back   HENT:      Head: Normocephalic and atraumatic.   Eyes:      Conjunctiva/sclera: Conjunctivae normal.   Pulmonary:      Effort: Pulmonary effort is normal. No respiratory distress.   Skin:     General: Skin is warm and dry.   Neurological:      Mental Status: He is alert and oriented to person, place, and time.   Psychiatric:         Mood and Affect: Mood and affect normal.         Behavior: Behavior normal.         Results  Results for orders placed or performed in visit on 04/29/22   POC Glycosylated Hemoglobin (Hb A1C)    Specimen: Blood   Result Value Ref Range    Hemoglobin A1C 8.0 %    Lot Number [id number]     Expiration Date 1/17/24         Assessment and Plan  Diagnoses and all orders for this visit:    Chronic pelvic pain in male  - 2 years of pelvic pain affecting every aspect of his life. Has filed for disability.  - no significant improvement with pelvic floor PT, treatment for prostatitis with rounds of abx and antiinflammatories, surgical repair of inguinal and umbilical hernias, L inferior hypogastric plexus block, ganglion impar block. Some slight improvement after electroacupuncture and recent injections.   - CT abd/pelvis w and wo contrast previously negative. MRI L spine negative and pelvis with possible T2 hyperintensity of pudendal nerve but question if this is expected vascular structures. Notes recent US with abnormal soft tissue findings suggestive of scar tissue and tissue damage.  - continues to see [facility]  - plan for electroacupuncture and again trying gabapentin but tapering slower to avoid nausea.    Severe depression  - following with " psychiatry and counseling at [facility]  - intolerant of pristiq  - now on auvelity BID and it seems to be helping    Hematospermia  - he is in consult with internist at [facility] and awaiting names of urologists.       Return in about 8 weeks (around 1/12/2024) for Follow up 30 minutes.

## 2024-01-16 ENCOUNTER — OFFICE VISIT (OUTPATIENT)
Dept: INTERNAL MEDICINE | Facility: CLINIC | Age: 54
End: 2024-01-16
Payer: COMMERCIAL

## 2024-01-16 VITALS
HEART RATE: 86 BPM | DIASTOLIC BLOOD PRESSURE: 82 MMHG | HEIGHT: 72 IN | OXYGEN SATURATION: 98 % | BODY MASS INDEX: 38.66 KG/M2 | RESPIRATION RATE: 18 BRPM | TEMPERATURE: 96.8 F | SYSTOLIC BLOOD PRESSURE: 124 MMHG | WEIGHT: 285.4 LBS

## 2024-01-16 DIAGNOSIS — R36.1 HEMATOSPERMIA: ICD-10-CM

## 2024-01-16 DIAGNOSIS — F32.2 SEVERE DEPRESSION: ICD-10-CM

## 2024-01-16 DIAGNOSIS — R10.2 CHRONIC PELVIC PAIN IN MALE: Primary | ICD-10-CM

## 2024-01-16 DIAGNOSIS — G89.29 CHRONIC PELVIC PAIN IN MALE: Primary | ICD-10-CM

## 2024-01-16 PROCEDURE — 99214 OFFICE O/P EST MOD 30 MIN: CPT | Performed by: INTERNAL MEDICINE

## 2024-01-16 NOTE — PROGRESS NOTES
"Internal Medicine Follow Up    Chief Complaint  Jeffery Olivas is a 53 y.o. male who presents today for follow up of chronic medical conditions outlined below.    Chief Complaint   Patient presents with    Chronic pelvic pain in male     Follow up         HPI  Mr. Olivas comes in today for follow up. Has continued care with [facility] and continues electro-acupuncture and injections. He is slowly increasing gabapentin and is currently on 100mg BID. So far only intermittent mild nausea. He is discouraged that all interventions he has tried have only given temporary if any relief. He is looking into seeing a new urologist and requests referral today. He remains on Auvelity which continues to help him to manage depressed moods and passive SI. He denies active SI. He declines A1c or labs today.         Review of Systems  Review of Systems   Genitourinary:  Positive for testicular pain.        +pelvic pain, hematospermia, reduced ejaculate   Musculoskeletal:  Positive for arthralgias and back pain.   Psychiatric/Behavioral:  Positive for suicidal ideas (passive) and depressed mood. Negative for sleep disturbance.         Current Medications  Current Outpatient Medications on File Prior to Visit   Medication Sig Dispense Refill    Auvelity  MG tablet controlled-release Take 1 tablet by mouth 2 (Two) Times a Day.      gabapentin (NEURONTIN) 100 MG capsule Take 1 capsule by mouth 2 (Two) Times a Day.      Multiple Vitamins-Minerals (MULTIVITAMIN ADULT EXTRA C PO) multivitamin       No current facility-administered medications on file prior to visit.       Allergies  Allergies   Allergen Reactions    Gabapentin Nausea And Vomiting    Codeine GI Intolerance     As a young child    Pristiq [Desvenlafaxine] Nausea Only       Objective  Visit Vitals  /82 (BP Location: Left arm, Patient Position: Sitting, Cuff Size: Adult)   Pulse 86   Temp 96.8 °F (36 °C) (Temporal)   Resp 18   Ht 182.9 cm (72\")   Wt 129 kg (285 lb " 6.4 oz)   SpO2 98%   BMI 38.71 kg/m²        Physical Exam  Physical Exam  Vitals and nursing note reviewed.   Constitutional:       General: He is not in acute distress.     Appearance: He is well-developed. He is obese. He is not ill-appearing or toxic-appearing.      Comments: Sitting in chair, reclined back   HENT:      Head: Normocephalic and atraumatic.   Eyes:      Conjunctiva/sclera: Conjunctivae normal.   Pulmonary:      Effort: Pulmonary effort is normal. No respiratory distress.   Neurological:      Mental Status: He is alert and oriented to person, place, and time. Mental status is at baseline.      Gait: Gait normal.   Psychiatric:         Mood and Affect: Mood is depressed. Affect is tearful.         Behavior: Behavior normal.         Results  Results for orders placed or performed in visit on 04/29/22   POC Glycosylated Hemoglobin (Hb A1C)    Specimen: Blood   Result Value Ref Range    Hemoglobin A1C 8.0 %    Lot Number [id number]     Expiration Date 1/17/24         Assessment and Plan  Diagnoses and all orders for this visit:    Chronic pelvic pain in male  - 2 years of pelvic pain affecting every aspect of his life. Has filed for disability.  - no significant improvement with pelvic floor PT, treatment for prostatitis with rounds of abx and antiinflammatories, surgical repair of inguinal and umbilical hernias, L inferior hypogastric plexus block, ganglion impar block. Only temporary improvement after electroacupuncture and recent injections.   - CT abd/pelvis w and wo contrast previously negative. MRI L spine negative and pelvis with possible T2 hyperintensity of pudendal nerve but question if this is expected vascular structures. Notes recent US with abnormal soft tissue findings suggestive of scar tissue and tissue damage.  - continues to see [facility] and is on low dose gabapentin. Will be seeing them next week to discuss next steps.    Hematospermia  - referring to urology at      Severe depression  - following with psychiatry and counseling at [facility]  - intolerant of pristiq  - on auvelity which seems to be helping       Return in about 2 months (around 3/16/2024) for Follow up urology 30 minutes.

## 2024-05-22 ENCOUNTER — OFFICE VISIT (OUTPATIENT)
Dept: INTERNAL MEDICINE | Facility: CLINIC | Age: 54
End: 2024-05-22
Payer: COMMERCIAL

## 2024-05-22 VITALS
SYSTOLIC BLOOD PRESSURE: 132 MMHG | TEMPERATURE: 97 F | WEIGHT: 280 LBS | RESPIRATION RATE: 12 BRPM | HEIGHT: 73 IN | BODY MASS INDEX: 37.11 KG/M2 | OXYGEN SATURATION: 98 % | HEART RATE: 80 BPM | DIASTOLIC BLOOD PRESSURE: 72 MMHG

## 2024-05-22 DIAGNOSIS — E11.65 TYPE 2 DIABETES MELLITUS WITH HYPERGLYCEMIA, WITHOUT LONG-TERM CURRENT USE OF INSULIN: ICD-10-CM

## 2024-05-22 DIAGNOSIS — R10.2 CHRONIC PELVIC PAIN IN MALE: Primary | ICD-10-CM

## 2024-05-22 DIAGNOSIS — F32.2 SEVERE DEPRESSION: ICD-10-CM

## 2024-05-22 DIAGNOSIS — R36.1 HEMATOSPERMIA: ICD-10-CM

## 2024-05-22 DIAGNOSIS — G89.29 CHRONIC PELVIC PAIN IN MALE: Primary | ICD-10-CM

## 2024-05-22 PROCEDURE — 99213 OFFICE O/P EST LOW 20 MIN: CPT | Performed by: INTERNAL MEDICINE

## 2024-05-22 NOTE — PROGRESS NOTES
Internal Medicine Follow Up    Chief Complaint  Jeffery Olivas is a 54 y.o. male who presents today for follow up of chronic medical conditions outlined below.    Chief Complaint   Patient presents with    Abdominal Pain     FOLLOW UP        HPI  Mr. Olivas comes in today for follow up. Had to travel to [place] January until recently to help relocate his parents to [place]. Both have dementia. Father is at [facility] and Mother is living independently for now. He has put all care with [facility] on hold aside from group therapy sessions via telehealth. Resumed in person individual therapy today. Continues gabapentin and auvelity. Mood stable with ongoing passive SI. He denies active SI. He is scheduled early June to see a urologist who he has been told specializes in trauma. He declines A1c or labs today. He would like to follow up again after seeing urology.    Abdominal Pain         Review of Systems  Review of Systems   Gastrointestinal:  Positive for abdominal pain.   Genitourinary:  Positive for testicular pain.        +pelvic pain, hematospermia, reduced ejaculate   Psychiatric/Behavioral:  Positive for suicidal ideas (passive), depressed mood and stress. Negative for sleep disturbance.         Current Medications  Current Outpatient Medications on File Prior to Visit   Medication Sig Dispense Refill    Dextromethorphan-buPROPion ER (Auvelity)  MG tablet controlled-release Take 1 tablet by mouth twice a day 60 tablet 2    gabapentin (NEURONTIN) 100 MG capsule Take 1 capsule by mouth 2 (Two) Times a Day.      Multiple Vitamins-Minerals (MULTIVITAMIN ADULT EXTRA C PO) multivitamin      [DISCONTINUED] Auvelity  MG tablet controlled-release Take 1 tablet by mouth 2 (Two) Times a Day.       No current facility-administered medications on file prior to visit.       Allergies  Allergies   Allergen Reactions    Gabapentin Nausea And Vomiting    Codeine GI Intolerance     As a young child    Pristiq  "[Desvenlafaxine] Nausea Only       Objective  Visit Vitals  /72 (BP Location: Left arm, Patient Position: Sitting, Cuff Size: Adult)   Pulse 80   Temp 97 °F (36.1 °C) (Temporal)   Resp 12   Ht 185 cm (72.84\")   Wt 127 kg (280 lb)   SpO2 98%   BMI 37.11 kg/m²        Physical Exam  Physical Exam  Vitals and nursing note reviewed.   Constitutional:       General: He is not in acute distress.     Appearance: He is well-developed. He is obese. He is not ill-appearing or toxic-appearing.      Comments: Sitting in chair, reclined back   HENT:      Head: Normocephalic and atraumatic.   Eyes:      Conjunctiva/sclera: Conjunctivae normal.   Pulmonary:      Effort: Pulmonary effort is normal. No respiratory distress.   Neurological:      Mental Status: He is alert and oriented to person, place, and time. Mental status is at baseline.      Gait: Gait normal.   Psychiatric:         Mood and Affect: Mood and affect normal.         Behavior: Behavior normal.         Results  Results for orders placed or performed in visit on 04/29/22   POC Glycosylated Hemoglobin (Hb A1C)    Specimen: Blood   Result Value Ref Range    Hemoglobin A1C 8.0 %    Lot Number [id number]     Expiration Date 1/17/24         Assessment and Plan  Diagnoses and all orders for this visit:    Chronic pelvic pain in male  - 2 years of pelvic pain affecting every aspect of his life. Has filed for disability.  - no significant improvement with pelvic floor PT, treatment for prostatitis with rounds of abx and antiinflammatories, surgical repair of inguinal and umbilical hernias, L inferior hypogastric plexus block, ganglion impar block. Only temporary improvement after electroacupuncture and injections.   - CT abd/pelvis w and wo contrast previously negative. MRI L spine negative and pelvis with possible T2 hyperintensity of pudendal nerve but question if this is expected vascular structures. Notes recent US with abnormal soft tissue findings suggestive of " scar tissue and tissue damage.  - continues to see [facility] and is on low dose gabapentin. Will be seeing a new urologist this coming month.    Hematospermia  - establishing with [facility] this coming month    Type 2 diabetes mellitus with hyperglycemia, without long-term current use of insulin  - has had A1c up to 8 4/2022  - this has not been well managed up until now  - declines A1c today    Severe depression  - following with psychiatry and counseling at [facility]  - intolerant of pristiq  - on auvelity which seems to be helping     Health Maintenance  - Colonoscopy: defers  - HCV: defers labs  - Immunizations: PCV20 discussed. Tdap 9/2020.  - Depression screening: phq9 = 10 5/2024    Return in about 20 days (around 6/11/2024) for Follow up 30 minutes.

## 2024-05-24 ENCOUNTER — CLINICAL SUPPORT (OUTPATIENT)
Dept: INTERNAL MEDICINE | Facility: CLINIC | Age: 54
End: 2024-05-24
Payer: MEDICARE

## 2024-05-24 DIAGNOSIS — Z23 NEED FOR VACCINATION: Primary | ICD-10-CM

## 2024-05-24 PROCEDURE — 90677 PCV20 VACCINE IM: CPT | Performed by: INTERNAL MEDICINE

## 2024-05-24 PROCEDURE — G0009 ADMIN PNEUMOCOCCAL VACCINE: HCPCS | Performed by: INTERNAL MEDICINE

## 2024-06-11 ENCOUNTER — OFFICE VISIT (OUTPATIENT)
Dept: INTERNAL MEDICINE | Facility: CLINIC | Age: 54
End: 2024-06-11
Payer: COMMERCIAL

## 2024-06-11 VITALS
SYSTOLIC BLOOD PRESSURE: 122 MMHG | BODY MASS INDEX: 37.24 KG/M2 | HEIGHT: 73 IN | TEMPERATURE: 97.7 F | DIASTOLIC BLOOD PRESSURE: 64 MMHG | OXYGEN SATURATION: 97 % | HEART RATE: 80 BPM | WEIGHT: 281 LBS

## 2024-06-11 DIAGNOSIS — R10.2 CHRONIC PELVIC PAIN IN MALE: Primary | ICD-10-CM

## 2024-06-11 DIAGNOSIS — R36.1 HEMATOSPERMIA: ICD-10-CM

## 2024-06-11 DIAGNOSIS — G89.29 CHRONIC PELVIC PAIN IN MALE: Primary | ICD-10-CM

## 2024-06-11 PROCEDURE — 99213 OFFICE O/P EST LOW 20 MIN: CPT | Performed by: INTERNAL MEDICINE

## 2024-06-11 RX ORDER — TAMSULOSIN HYDROCHLORIDE 0.4 MG/1
0.4 CAPSULE ORAL
COMMUNITY
Start: 2024-06-03 | End: 2025-06-03

## 2024-06-11 RX ORDER — PREGABALIN 25 MG/1
CAPSULE ORAL
COMMUNITY
Start: 2024-06-05

## 2024-06-11 NOTE — PROGRESS NOTES
Internal Medicine Follow Up    Chief Complaint  Jeffery Olivas is a 54 y.o. male who presents today for follow up of chronic medical conditions outlined below.    Chief Complaint   Patient presents with    Pain     Chronic pain follow up        HPI  Mr. Olivas comes in today for follow up. Met with Dr. Rodriguez at . Has pelvic MRI w and wo contrast next month followed by a follow up appt. 8 days in on a trial of tamsulosin with no effect noted. Saw [facility] provider who discontinued gabapentin due to nausea with dose increase and inefficacy at tolerated dose. Now on lyrica 25mg daily with plan to titrate up slowly. No effect as of yet. Continues group and individual therapy as well as auvelity through [facility]. Mood stable. He declines A1c or labs today. He would like to postpone follow up until after my maternity leave ends. Prefers not to see another provider unless necessary.    Abdominal Pain         Review of Systems  Review of Systems   Gastrointestinal:  Positive for abdominal pain.   Genitourinary:  Positive for testicular pain.        +pelvic pain, hematospermia, reduced ejaculate        Current Medications  Current Outpatient Medications on File Prior to Visit   Medication Sig Dispense Refill    Dextromethorphan-buPROPion ER (Auvelity)  MG tablet controlled-release Take 1 tablet by mouth twice a day 60 tablet 2    Multiple Vitamins-Minerals (MULTIVITAMIN ADULT EXTRA C PO) multivitamin      pregabalin (LYRICA) 25 MG capsule       tamsulosin (FLOMAX) 0.4 MG capsule 24 hr capsule Take 1 capsule by mouth.      [DISCONTINUED] gabapentin (NEURONTIN) 100 MG capsule Take 1 capsule by mouth 2 (Two) Times a Day.       No current facility-administered medications on file prior to visit.       Allergies  Allergies   Allergen Reactions    Codeine GI Intolerance     As a young child    Gabapentin Nausea And Vomiting    Pristiq [Desvenlafaxine] Nausea Only       Objective  Visit Vitals  /64   Pulse 80  "  Temp 97.7 °F (36.5 °C) (Infrared)   Ht 185 cm (72.84\")   Wt 127 kg (281 lb)   SpO2 97%   BMI 37.24 kg/m²        Physical Exam  Physical Exam  Vitals and nursing note reviewed.   Constitutional:       General: He is not in acute distress.     Appearance: He is well-developed. He is obese. He is not ill-appearing or toxic-appearing.      Comments: Sitting in chair, reclined back   HENT:      Head: Normocephalic and atraumatic.   Eyes:      Conjunctiva/sclera: Conjunctivae normal.   Pulmonary:      Effort: Pulmonary effort is normal. No respiratory distress.   Neurological:      Mental Status: He is alert and oriented to person, place, and time. Mental status is at baseline.      Gait: Gait normal.   Psychiatric:         Mood and Affect: Mood and affect normal.         Behavior: Behavior normal.         Results  Results for orders placed or performed in visit on 04/29/22   POC Glycosylated Hemoglobin (Hb A1C)    Specimen: Blood   Result Value Ref Range    Hemoglobin A1C 8.0 %    Lot Number [id number]     Expiration Date 1/17/24         Assessment and Plan  Diagnoses and all orders for this visit:    Chronic pelvic pain in male  - 2 years of pelvic pain affecting every aspect of his life. Has filed for disability.  - no significant improvement with pelvic floor PT, treatment for prostatitis with rounds of abx and antiinflammatories, surgical repair of inguinal and umbilical hernias, L inferior hypogastric plexus block, ganglion impar block. Only temporary improvement after electroacupuncture and injections.   - CT abd/pelvis w and wo contrast previously negative. MRI L spine negative and pelvis with possible T2 hyperintensity of pudendal nerve but question if this is expected vascular structures. Notes US with [facility] showing abnormal soft tissue findings suggestive of scar tissue and tissue damage.  - continues to see [facility] and has switched from gabapentin to lyrica 25mg daily with plan to " titrate dose. Also seeing [facility], on tamsulosin without benefit and planned for MRI pelvis w wo contrast.    Hematospermia  - established with [facility], has planned MRI pelvis/prostate w and wo contrast     Health Maintenance  - Colonoscopy: defers  - HCV: defers labs  - Immunizations: PCV20 discussed. Tdap 9/2020.  - Depression screening: phq9 = 10 5/2024    Return in about 4 months (around 10/21/2024) for Follow up 30 minutes.

## 2025-03-18 ENCOUNTER — OFFICE VISIT (OUTPATIENT)
Dept: INTERNAL MEDICINE | Facility: CLINIC | Age: 55
End: 2025-03-18
Payer: MEDICARE

## 2025-03-18 VITALS
HEART RATE: 84 BPM | SYSTOLIC BLOOD PRESSURE: 130 MMHG | TEMPERATURE: 98.7 F | HEIGHT: 73 IN | DIASTOLIC BLOOD PRESSURE: 70 MMHG | WEIGHT: 279.6 LBS | BODY MASS INDEX: 37.05 KG/M2 | OXYGEN SATURATION: 96 %

## 2025-03-18 DIAGNOSIS — R07.89 ATYPICAL CHEST PAIN: ICD-10-CM

## 2025-03-18 DIAGNOSIS — F32.2 SEVERE DEPRESSION: ICD-10-CM

## 2025-03-18 DIAGNOSIS — Z00.00 HEALTHCARE MAINTENANCE: ICD-10-CM

## 2025-03-18 DIAGNOSIS — Z12.83 SKIN EXAM, SCREENING FOR CANCER: ICD-10-CM

## 2025-03-18 DIAGNOSIS — E78.2 MIXED HYPERLIPIDEMIA: ICD-10-CM

## 2025-03-18 DIAGNOSIS — R10.2 CHRONIC PELVIC PAIN IN MALE: Primary | ICD-10-CM

## 2025-03-18 DIAGNOSIS — Z11.59 ENCOUNTER FOR HEPATITIS C SCREENING TEST FOR LOW RISK PATIENT: ICD-10-CM

## 2025-03-18 DIAGNOSIS — G89.29 CHRONIC PELVIC PAIN IN MALE: Primary | ICD-10-CM

## 2025-03-18 DIAGNOSIS — M62.89 PELVIC FLOOR DYSFUNCTION: ICD-10-CM

## 2025-03-18 DIAGNOSIS — E11.65 TYPE 2 DIABETES MELLITUS WITH HYPERGLYCEMIA, WITHOUT LONG-TERM CURRENT USE OF INSULIN: ICD-10-CM

## 2025-03-18 NOTE — PROGRESS NOTES
Internal Medicine Follow Up    Chief Complaint  Jeffery Olivas is a 55 y.o. male who presents today for follow up of chronic medical conditions outlined below.    Chief Complaint   Patient presents with    Prostate Pain    Diabetes     Denies A1c today        HPI  Mr. Olivas comes in today for follow up. Notes that he last followed up with Dr. Rodriguez at  in the summer. Has had 3 appointments since then rescheduled so currently not planning to return. He reports no additional findings on pelvic MRI w and wo contrast. He was referred to a pelvic floor physical therapist and notes use of both a wand and e-stim have been beneficial. He is able to do both at home and sees PT once a month as well. He continues to follow with the providers at [facility]. He has discontinued lyrica due to side effects. Interventional procedures also currently on hold. He does continue auvelity for depression and continues group therapy. His individual therapist has moved to [facility] and he is scheduled to establish with her there. He declines A1c or labs today. He will return between visits to complete them. He does note recent brief chest pains typically after eating. Believes they are due to indigestion but would like referral to cardiology for further workup as well. He also requests dermatology referral for skin exam.    Diabetes  Associated symptoms include chest pain.        Review of Systems  Review of Systems   Cardiovascular:  Positive for chest pain.   Gastrointestinal:  Positive for abdominal pain.   Genitourinary:  Positive for testicular pain (pelvic pain). Negative for decreased urine volume, difficulty urinating, dysuria, frequency, hematuria and urgency.   Skin:  Positive for skin lesions.   Psychiatric/Behavioral:  Positive for depressed mood.         Current Medications  Current Outpatient Medications on File Prior to Visit   Medication Sig Dispense Refill    Dextromethorphan-buPROPion ER (Auvelity)  MG tablet  "controlled-release Take 1 tablet by mouth twice a day 60 tablet 2    Multiple Vitamins-Minerals (MULTIVITAMIN ADULT EXTRA C PO) multivitamin      [DISCONTINUED] pregabalin (LYRICA) 25 MG capsule       [DISCONTINUED] tamsulosin (FLOMAX) 0.4 MG capsule 24 hr capsule Take 1 capsule by mouth.       No current facility-administered medications on file prior to visit.       Allergies  Allergies   Allergen Reactions    Codeine GI Intolerance     As a young child    Gabapentin Nausea And Vomiting    Pristiq [Desvenlafaxine] Nausea Only       Objective  Visit Vitals  /70 (BP Location: Left arm, Patient Position: Sitting)   Pulse 84   Temp 98.7 °F (37.1 °C) (Temporal)   Ht 185.4 cm (73\")   Wt 127 kg (279 lb 9.6 oz)   SpO2 96%   BMI 36.89 kg/m²        Physical Exam  Physical Exam  Vitals and nursing note reviewed.   Constitutional:       General: He is not in acute distress.     Appearance: He is well-developed. He is obese. He is not ill-appearing or toxic-appearing.      Comments: Sitting in chair, reclined back   HENT:      Head: Normocephalic and atraumatic.   Eyes:      Conjunctiva/sclera: Conjunctivae normal.   Cardiovascular:      Rate and Rhythm: Normal rate and regular rhythm.      Heart sounds: Normal heart sounds. No murmur heard.  Pulmonary:      Effort: Pulmonary effort is normal. No respiratory distress.   Neurological:      Mental Status: He is alert and oriented to person, place, and time. Mental status is at baseline.      Gait: Gait normal.   Psychiatric:         Mood and Affect: Mood and affect normal.         Behavior: Behavior normal.         Results  Results for orders placed or performed in visit on 04/29/22   POC Glycosylated Hemoglobin (Hb A1C)    Collection Time: 04/29/22  9:35 AM    Specimen: Blood   Result Value Ref Range    Hemoglobin A1C 8.0 %    Lot Number [id number]     Expiration Date 1/17/24         ECG 12 Lead    Date/Time: 3/18/2025 12:13 PM  Performed by: Tonia Webb, " MD    Authorized by: Tonia Webb MD  Comparison: compared with previous ECG from 10/8/2021  Similar to previous ECG  Rhythm: sinus rhythm  Rate: normal  BPM: 75  Conduction: conduction normal  ST Segments: ST segments normal  T Waves: T waves normal  QRS axis: normal  Other: no other findings    Clinical impression: normal ECG          Assessment and Plan  Diagnoses and all orders for this visit:    Chronic pelvic pain in male  Pelvic floor dysfunction  - several years of pelvic pain affecting every aspect of his life. Has filed for disability.  - no significant improvement with initial pelvic floor PT, treatment for prostatitis with rounds of abx and antiinflammatories, surgical repair of inguinal and umbilical hernias, L inferior hypogastric plexus block, ganglion impar block. Only temporary improvement after electroacupuncture and injections.   - CT abd/pelvis w and wo contrast previously negative. MRI L spine negative and pelvis with possible T2 hyperintensity of pudendal nerve but question if this is expected vascular structures. Notes US with [facility] showing abnormal soft tissue findings suggestive of scar tissue and tissue damage. MRI pelvis w and wo contrast at  reportedly with recurrent bilateral inguinal hernias, changes of BPH with PIRADS 3 lesion and chronic prostatitis.  - continues to see [facility] but all medications and interventional procedures on hold.  - In pelvic floor PT    Atypical chest pain  - occurs briefly after eating however has risk factors for CAD  - ECG today NSR  - referral to cardiology    Type 2 diabetes mellitus with hyperglycemia, without long-term current use of insulin  - has had A1c up to 8 4/2022  - this has not been well managed up until now  - declines A1c today but willing to return to lab    Mixed hyperlipidemia  - will update lipids    Severe depression  - following with psychiatry and therapist on auvelity    Skin exam, screening for  cancer  -     Ambulatory Referral to Dermatology    Healthcare maintenance  -     CBC (No Diff); Future  -     Comprehensive Metabolic Panel; Future  -     TSH Rfx On Abnormal To Free T4; Future     Health Maintenance  - Colonoscopy: defers  - HCV: ordered  - Immunizations: PCV20 UTD. Tdap 9/2020. Shingrix complete. HBV complete. COVID and flu UTD.  - Depression screening: phq9 = 10 5/2024    Return in about 3 months (around 6/18/2025) for Follow up 30 minutes.    Today I have spent a total of 47 minutes caring for Jeffery Olivas.  This includes time spent preparing for the visit, reviewing tests, performing a medically appropriate examination and/or evaluation , counseling and educating the patient/family/caregiver, ordering medications, tests, or procedures and documenting information in the medical record.

## 2025-04-16 ENCOUNTER — OFFICE VISIT (OUTPATIENT)
Dept: CARDIOLOGY | Facility: CLINIC | Age: 55
End: 2025-04-16
Payer: COMMERCIAL

## 2025-04-16 VITALS
SYSTOLIC BLOOD PRESSURE: 142 MMHG | HEIGHT: 74 IN | HEART RATE: 82 BPM | WEIGHT: 277 LBS | OXYGEN SATURATION: 92 % | DIASTOLIC BLOOD PRESSURE: 82 MMHG | BODY MASS INDEX: 35.55 KG/M2

## 2025-04-16 DIAGNOSIS — R07.89 CHEST PAIN, ATYPICAL: Primary | ICD-10-CM

## 2025-04-16 DIAGNOSIS — I10 PRIMARY HYPERTENSION: ICD-10-CM

## 2025-04-16 DIAGNOSIS — E78.2 MIXED HYPERLIPIDEMIA: ICD-10-CM

## 2025-04-16 NOTE — PROGRESS NOTES
Cardiac Electrophysiology Outpatient Consult Note            [facility]    Consult Note     Jeffery Olivas  [id number]  04/16/2025       Primary Care Physician: Tonia Webb MD    Referred By: Tonia Webb MD    Subjective     Chief Complaint:   Diagnoses and all orders for this visit:    1. Chest pain, atypical (Primary)    2. Mixed hyperlipidemia    3. Primary hypertension      Chief Complaint   Patient presents with    Chest Pain       History of Present Illness:   Jeffery Olivas is a 55 y.o. male who presents to  electrophysiology clinic for evaluation of chest pain and risk factors cardiovascular disease.  Mr. Aldridge is originally from [place] he moved to [place] and he works for [employer] which he is now retired from.  He tells me he has had difficulty with his health because of injuries in his back as well as his knee he is gained a significant mount of weight has had trouble tolerating exercise.  He has had a significant elevated A1c as well as cholesterol levels.  He is scheduled follow-up with his PCP regarding recent laboratory data in the next few months.  He states recently has had some discomfort in his chest described as as a tingling or sometimes a sharp pain that is random in nature not particular so is exertion.  It is here and there seems just occur on random days.  He has not noted any worsening shortness of breath with exertion or angina type symptoms.  He denies any heart failure dizziness near syncope syncope.  Because he knows he has multiple risk factors cardiovascular he is he wanted his chest pain to be evaluated for.      Problem List:  Atypical chest pain  HTN  HLD:    DM Type II-HbA1c 8.0  Obesity  PEREZ, CPAP    Past Medical History:   Past Medical History:   Diagnosis Date    Allergic 1983    Cat    Anxiety 2021    Benign prostatic hyperplasia 2021    Ongoing    Chronic prostatitis     Chronic prostatitis      Depression 2021    Primary hypertension 4/16/2025    Prostate cancer screening 03/29/2022    Type 2 diabetes mellitus with hyperglycemia, without long-term current use of insulin 01/27/2023    Wears glasses        Past Surgical History:   Past Surgical History:   Procedure Laterality Date    ACHILLES TENDON SURGERY  2009    2 surgeries    APPENDECTOMY  2002    INGUINAL HERNIA REPAIR Bilateral 10/11/2021    Procedure: ROBOT ASSISTED LAPAROSCOPIC BILATERAL INGUINAL HERNIA REPAIR;  Surgeon: Alcides Chaudhary MD;  Location:  [facility];  Service: Robotics - DaVinci;  Laterality: Bilateral;    KNEE MENISCAL REPAIR Bilateral     2015 and 2017    SEPTOPLASTY  2005    UMBILICAL HERNIA REPAIR N/A 10/11/2021    Procedure: OPEN UMBILICAL HERNIA REPAIR;  Surgeon: Alcides Chaudhary MD;  Location:  [facility];  Service: General;  Laterality: N/A;    VASECTOMY  2012       Family History:   Family History   Problem Relation Age of Onset    Dementia Mother     Alzheimer's disease Father 82    No Known Problems Brother     No Known Problems Maternal Grandmother     Hiatal hernia Maternal Grandfather     Alzheimer's disease Paternal Grandmother     No Known Problems Paternal Grandfather        Social History:   Social History     Socioeconomic History    Marital status:    Tobacco Use    Smoking status: Never     Passive exposure: Never    Smokeless tobacco: Never   Vaping Use    Vaping status: Never Used   Substance and Sexual Activity    Alcohol use: Yes     Alcohol/week: 4.0 standard drinks of alcohol     Types: 2 Glasses of wine, 2 Cans of beer per week     Comment: occ    Drug use: Never    Sexual activity: Yes     Partners: Female     Birth control/protection: Surgical     Comment:        Medications:     Current Outpatient Medications:     Dextromethorphan-buPROPion ER (Auvelity)  MG tablet controlled-release, Take 1 tablet by mouth twice a day, Disp: 60 tablet, Rfl: 2    Multiple Vitamins-Minerals  "(MULTIVITAMIN ADULT EXTRA C PO), multivitamin, Disp: , Rfl:     Allergies:   Allergies   Allergen Reactions    Codeine GI Intolerance     As a young child    Gabapentin Nausea And Vomiting    Pristiq [Desvenlafaxine] Nausea Only       Objective   Vital Signs:   Vitals:    04/16/25 0809   BP: 142/82   Pulse: 82   SpO2: 92%   Weight: 126 kg (277 lb)   Height: 188 cm (74\")       PHYSICAL EXAM  General appearance: Awake, alert, cooperative  Head: Normocephalic, without obvious abnormality, atraumatic  Eyes: Conjunctivae/corneas clear, EOMs intact  Neck: no adenopathy, no carotid bruit, no JVD, and thyroid: not enlarged  Lungs: clear to auscultation bilaterally and no rhonchi or crackles\", ' symmetric  Heart: regular rate and rhythm, S1, S2 normal, no murmur, click, rub or gallop  Abdomen: Soft, non-tender, bowel sounds normal,  no organomegaly  Extremities: extremities normal, atraumatic, no cyanosis or edema  Skin: Skin color, turgor normal, no rashes or lesions  Neurologic: Grossly normal     Lab Results   Component Value Date    GLUCOSE 130 (H) 10/08/2021    CALCIUM 10.0 10/08/2021     10/08/2021    K 4.3 10/08/2021    CO2 24.0 10/08/2021     10/08/2021    BUN 15 10/08/2021    CREATININE 0.94 10/08/2021    EGFRIFNONA 84 10/08/2021    BCR 16.0 10/08/2021    ANIONGAP 13.0 10/08/2021     Lab Results   Component Value Date    WBC 8.31 10/08/2021    HGB 16.1 10/08/2021    HCT 46.1 10/08/2021    MCV 90.6 10/08/2021     10/08/2021     Lab Results   Component Value Date    INR 0.93 10/08/2021    PROTIME 12.2 10/08/2021     No results found for: \"TSH\", \"O3VPGJD\", \"X0YGGJT\", \"THYROIDAB\"    Cardiac Testing:      I personally viewed and interpreted the patient's EKG/Telemetry/lab data      ECG 12 Lead    Date/Time: 4/16/2025 8:24 AM  Performed by: Tyler Gtz PA    Authorized by: Tyler Gtz PA  Comparison: not compared with previous ECG   Rhythm: sinus rhythm  Rate: normal  Conduction: " "conduction normal  ST Segments: ST segments normal  QRS axis: left    Clinical impression: normal ECG           Assessment & Plan    Diagnoses and all orders for this visit:    1. Chest pain, atypical (Primary)    2. Mixed hyperlipidemia    3. Primary hypertension         Diagnosis Plan   1. Chest pain, atypical  Symptoms are atypical in nature he does not think he can walk on the treadmill stress test we did talk to him about pursuing a coronary CTA he is agreeable to do so.      2. Mixed hyperlipidemia  Discussed option of starting statin therapy.  He wants to continue to try \"diet\".  He will have follow-up lipid panel with his PCP.  We discussed the risk and benefits taking statin therapy.      3. Primary hypertension  Pressure elevated today 140 systolic we offered option medications he again wants to try to diet exercise and weight loss to try improve upon this.  We discussed risk and benefit of taking medication regarding this.      Coronary CTA to rule out coronary artery disease.  Will pursue echocardiogram.  We discussed risk and benefits of risk factor modification clean diet exercise weight loss consider statin therapy.  He will follow-up lipid panel his PCP.  He return follow-up or office in 2 months or sooner as needed.  Electronically signed by CHARMAINE Patel, 04/16/25, 1:02 PM EDT.   "

## 2025-04-24 ENCOUNTER — HOSPITAL ENCOUNTER (OUTPATIENT)
Dept: CARDIOLOGY | Facility: HOSPITAL | Age: 55
Discharge: HOME | End: 2025-04-24
Admitting: PHYSICIAN ASSISTANT
Payer: COMMERCIAL

## 2025-04-24 VITALS
SYSTOLIC BLOOD PRESSURE: 139 MMHG | HEIGHT: 74 IN | BODY MASS INDEX: 35.65 KG/M2 | DIASTOLIC BLOOD PRESSURE: 78 MMHG | WEIGHT: 277.78 LBS

## 2025-04-24 DIAGNOSIS — R07.89 CHEST PAIN, ATYPICAL: ICD-10-CM

## 2025-04-24 LAB
AORTIC DIMENSIONLESS INDEX: 0.83 (DI)
ASCENDING AORTA: 3.3 CM
AV MEAN PRESS GRAD SYS DOP V1V2: 3.1 MMHG
AV VMAX SYS DOP: 119.7 CM/SEC
BH CV ECHO MEAS - AO MAX PG: 5.7 MMHG
BH CV ECHO MEAS - AO ROOT DIAM: 3.6 CM
BH CV ECHO MEAS - AO V2 VTI: 23.5 CM
BH CV ECHO MEAS - AVA(I,D): 4.4 CM2
BH CV ECHO MEAS - EDV(MOD-SP2): 184.1 ML
BH CV ECHO MEAS - EDV(MOD-SP4): 186.1 ML
BH CV ECHO MEAS - EF(MOD-SP2): 64.1 %
BH CV ECHO MEAS - EF(MOD-SP4): 50.3 %
BH CV ECHO MEAS - ESV(MOD-SP2): 66.1 ML
BH CV ECHO MEAS - ESV(MOD-SP4): 92.5 ML
BH CV ECHO MEAS - IVS/LVPW: 0.91 CM
BH CV ECHO MEAS - IVSD: 1 CM
BH CV ECHO MEAS - LA DIMENSION: 3.4 CM
BH CV ECHO MEAS - LAT PEAK E' VEL: 9.5 CM/SEC
BH CV ECHO MEAS - LV DIASTOLIC VOL/BSA (35-75): 74.6 CM2
BH CV ECHO MEAS - LV MAX PG: 3.2 MMHG
BH CV ECHO MEAS - LV MEAN PG: 1.6 MMHG
BH CV ECHO MEAS - LV SYSTOLIC VOL/BSA (12-30): 37.1 CM2
BH CV ECHO MEAS - LV V1 MAX: 88.8 CM/SEC
BH CV ECHO MEAS - LV V1 VTI: 19.4 CM
BH CV ECHO MEAS - LVIDD: 5.8 CM
BH CV ECHO MEAS - LVIDS: 2.6 CM
BH CV ECHO MEAS - LVOT AREA: 5.3 CM2
BH CV ECHO MEAS - LVOT DIAM: 2.6 CM
BH CV ECHO MEAS - LVPWD: 1.1 CM
BH CV ECHO MEAS - MED PEAK E' VEL: 8.4 CM/SEC
BH CV ECHO MEAS - MV A MAX VEL: 75.2 CM/SEC
BH CV ECHO MEAS - MV DEC SLOPE: 269.5 CM/SEC2
BH CV ECHO MEAS - MV E MAX VEL: 72.8 CM/SEC
BH CV ECHO MEAS - MV E/A: 0.97
BH CV ECHO MEAS - MV MAX PG: 2.6 MMHG
BH CV ECHO MEAS - MV MEAN PG: 1.8 MMHG
BH CV ECHO MEAS - MV P1/2T: 80 MSEC
BH CV ECHO MEAS - MV V2 VTI: 24 CM
BH CV ECHO MEAS - MVA(P1/2T): 2.7 CM2
BH CV ECHO MEAS - MVA(VTI): 4.3 CM2
BH CV ECHO MEAS - PA ACC TIME: 0.13 SEC
BH CV ECHO MEAS - PA V2 MAX: 108.1 CM/SEC
BH CV ECHO MEAS - RAP SYSTOLE: 3 MMHG
BH CV ECHO MEAS - RVSP: 15 MMHG
BH CV ECHO MEAS - SV(LVOT): 103 ML
BH CV ECHO MEAS - SV(MOD-SP2): 118 ML
BH CV ECHO MEAS - SV(MOD-SP4): 93.6 ML
BH CV ECHO MEAS - SVI(LVOT): 41.3 ML/M2
BH CV ECHO MEAS - SVI(MOD-SP2): 47.3 ML/M2
BH CV ECHO MEAS - SVI(MOD-SP4): 37.5 ML/M2
BH CV ECHO MEAS - TAPSE (>1.6): 3.3 CM
BH CV ECHO MEAS - TR MAX PG: 12 MMHG
BH CV ECHO MEASUREMENTS AVERAGE E/E' RATIO: 8.13
BH CV XLRA - RV BASE: 3.9 CM
BH CV XLRA - RV LENGTH: 6.8 CM
BH CV XLRA - RV MID: 3.2 CM
IVRT: 100 MS
LEFT ATRIUM VOLUME INDEX: 31.7 ML/M2
LV EF BIPLANE MOD: 64 %

## 2025-04-24 PROCEDURE — 93306 TTE W/DOPPLER COMPLETE: CPT

## 2025-04-28 DIAGNOSIS — R07.89 CHEST PAIN, ATYPICAL: Primary | ICD-10-CM

## 2025-04-28 RX ORDER — METOPROLOL TARTRATE 50 MG
TABLET ORAL
Qty: 2 TABLET | Refills: 0 | Status: SHIPPED | OUTPATIENT
Start: 2025-04-28

## 2025-04-28 RX ORDER — SODIUM CHLORIDE 0.9 % (FLUSH) 0.9 %
10 SYRINGE (ML) INJECTION EVERY 12 HOURS SCHEDULED
OUTPATIENT
Start: 2025-04-28

## 2025-04-28 RX ORDER — NITROGLYCERIN 0.4 MG/1
0.8 TABLET SUBLINGUAL
OUTPATIENT
Start: 2025-04-28

## 2025-04-28 RX ORDER — METOPROLOL TARTRATE 25 MG/1
200 TABLET, FILM COATED ORAL ONCE
OUTPATIENT
Start: 2025-04-28 | End: 2025-04-28

## 2025-04-28 RX ORDER — NITROGLYCERIN 0.4 MG/1
0.4 TABLET SUBLINGUAL
OUTPATIENT
Start: 2025-04-28 | End: 2025-04-28

## 2025-04-28 RX ORDER — SODIUM CHLORIDE 9 MG/ML
40 INJECTION, SOLUTION INTRAVENOUS AS NEEDED
OUTPATIENT
Start: 2025-04-28

## 2025-04-28 RX ORDER — METOPROLOL TARTRATE 25 MG/1
100 TABLET, FILM COATED ORAL ONCE
OUTPATIENT
Start: 2025-04-28

## 2025-04-28 RX ORDER — METOPROLOL TARTRATE 25 MG/1
150 TABLET, FILM COATED ORAL ONCE
OUTPATIENT
Start: 2025-04-28

## 2025-04-28 RX ORDER — METOPROLOL TARTRATE 1 MG/ML
5 INJECTION, SOLUTION INTRAVENOUS
OUTPATIENT
Start: 2025-04-28

## 2025-04-28 RX ORDER — IVABRADINE 5 MG/1
15 TABLET, FILM COATED ORAL ONCE
OUTPATIENT
Start: 2025-04-28 | End: 2025-04-28

## 2025-04-28 RX ORDER — METOPROLOL TARTRATE 25 MG/1
50 TABLET, FILM COATED ORAL ONCE
OUTPATIENT
Start: 2025-04-28

## 2025-04-28 RX ORDER — METOPROLOL TARTRATE 50 MG
50 TABLET ORAL
OUTPATIENT
Start: 2025-04-28

## 2025-04-28 RX ORDER — SODIUM CHLORIDE 0.9 % (FLUSH) 0.9 %
10 SYRINGE (ML) INJECTION AS NEEDED
OUTPATIENT
Start: 2025-04-28

## 2025-06-10 ENCOUNTER — TELEPHONE (OUTPATIENT)
Facility: HOSPITAL | Age: 55
End: 2025-06-10
Payer: COMMERCIAL

## 2025-06-10 NOTE — TELEPHONE ENCOUNTER
Pt contacted regarding CTA. Educated on pre procedure, reviewed home medications and allergies. Scheduled for 0845 6/11/25

## 2025-06-11 ENCOUNTER — HOSPITAL ENCOUNTER (OUTPATIENT)
Facility: HOSPITAL | Age: 55
Discharge: HOME | End: 2025-06-11
Payer: COMMERCIAL

## 2025-06-11 VITALS
DIASTOLIC BLOOD PRESSURE: 72 MMHG | RESPIRATION RATE: 22 BRPM | TEMPERATURE: 97.7 F | BODY MASS INDEX: 36.36 KG/M2 | WEIGHT: 283.29 LBS | HEIGHT: 74 IN | OXYGEN SATURATION: 96 % | HEART RATE: 74 BPM | SYSTOLIC BLOOD PRESSURE: 111 MMHG

## 2025-06-11 DIAGNOSIS — R07.89 CHEST PAIN, ATYPICAL: ICD-10-CM

## 2025-06-11 PROCEDURE — 75574 CT ANGIO HRT W/3D IMAGE: CPT | Performed by: INTERNAL MEDICINE

## 2025-06-11 PROCEDURE — 75574 CT ANGIO HRT W/3D IMAGE: CPT

## 2025-06-11 PROCEDURE — 25510000001 IOPAMIDOL PER 1 ML: Performed by: PHYSICIAN ASSISTANT

## 2025-06-11 RX ORDER — SODIUM CHLORIDE 0.9 % (FLUSH) 0.9 %
10 SYRINGE (ML) INJECTION EVERY 12 HOURS SCHEDULED
Status: DISCONTINUED | OUTPATIENT
Start: 2025-06-11 | End: 2025-06-12 | Stop reason: HOSPADM

## 2025-06-11 RX ORDER — NITROGLYCERIN 0.4 MG/1
0.8 TABLET SUBLINGUAL
Status: COMPLETED | OUTPATIENT
Start: 2025-06-11 | End: 2025-06-11

## 2025-06-11 RX ORDER — METOPROLOL TARTRATE 1 MG/ML
5 INJECTION, SOLUTION INTRAVENOUS
Status: DISCONTINUED | OUTPATIENT
Start: 2025-06-11 | End: 2025-06-12 | Stop reason: HOSPADM

## 2025-06-11 RX ORDER — IOPAMIDOL 755 MG/ML
100 INJECTION, SOLUTION INTRAVASCULAR
Status: COMPLETED | OUTPATIENT
Start: 2025-06-11 | End: 2025-06-11

## 2025-06-11 RX ORDER — IVABRADINE 5 MG/1
15 TABLET, FILM COATED ORAL ONCE
Status: DISCONTINUED | OUTPATIENT
Start: 2025-06-11 | End: 2025-06-12 | Stop reason: HOSPADM

## 2025-06-11 RX ORDER — METOPROLOL TARTRATE 25 MG/1
50 TABLET, FILM COATED ORAL
Status: DISCONTINUED | OUTPATIENT
Start: 2025-06-11 | End: 2025-06-12 | Stop reason: HOSPADM

## 2025-06-11 RX ORDER — SODIUM CHLORIDE 0.9 % (FLUSH) 0.9 %
10 SYRINGE (ML) INJECTION AS NEEDED
Status: DISCONTINUED | OUTPATIENT
Start: 2025-06-11 | End: 2025-06-12 | Stop reason: HOSPADM

## 2025-06-11 RX ORDER — NITROGLYCERIN 0.4 MG/1
0.4 TABLET SUBLINGUAL
Status: COMPLETED | OUTPATIENT
Start: 2025-06-11 | End: 2025-06-11

## 2025-06-11 RX ORDER — SODIUM CHLORIDE 9 MG/ML
40 INJECTION, SOLUTION INTRAVENOUS AS NEEDED
Status: DISCONTINUED | OUTPATIENT
Start: 2025-06-11 | End: 2025-06-12 | Stop reason: HOSPADM

## 2025-06-11 RX ORDER — METOPROLOL TARTRATE 25 MG/1
50 TABLET, FILM COATED ORAL ONCE
Status: DISCONTINUED | OUTPATIENT
Start: 2025-06-11 | End: 2025-06-12 | Stop reason: HOSPADM

## 2025-06-11 RX ORDER — METOPROLOL TARTRATE 100 MG/1
200 TABLET ORAL ONCE
Status: DISCONTINUED | OUTPATIENT
Start: 2025-06-11 | End: 2025-06-12 | Stop reason: HOSPADM

## 2025-06-11 RX ORDER — METOPROLOL TARTRATE 100 MG/1
100 TABLET ORAL ONCE
Status: DISCONTINUED | OUTPATIENT
Start: 2025-06-11 | End: 2025-06-12 | Stop reason: HOSPADM

## 2025-06-11 RX ADMIN — NITROGLYCERIN 0.8 MG: 0.4 TABLET SUBLINGUAL at 09:37

## 2025-06-11 RX ADMIN — IOPAMIDOL 70 ML: 755 INJECTION, SOLUTION INTRAVENOUS at 09:44

## 2025-06-11 RX ADMIN — METOPROLOL TARTRATE 5 MG: 5 INJECTION INTRAVENOUS at 09:10

## 2025-06-12 ENCOUNTER — HOSPITAL ENCOUNTER (OUTPATIENT)
Dept: CT IMAGING | Facility: HOSPITAL | Age: 55
Discharge: HOME | End: 2025-06-12
Admitting: INTERNAL MEDICINE
Payer: MEDICARE

## 2025-06-12 DIAGNOSIS — R93.1 ABNORMAL FINDINGS DIAGNOSTIC IMAGING OF HEART AND CORONARY CIRCULATION: Primary | ICD-10-CM

## 2025-06-12 DIAGNOSIS — R93.1 ABNORMAL FINDINGS DIAGNOSTIC IMAGING OF HEART AND CORONARY CIRCULATION: ICD-10-CM

## 2025-06-12 PROCEDURE — 75580 N-INVAS EST C FFR SW ALY CTA: CPT

## 2025-06-19 ENCOUNTER — OFFICE VISIT (OUTPATIENT)
Dept: CARDIOLOGY | Facility: CLINIC | Age: 55
End: 2025-06-19
Payer: COMMERCIAL

## 2025-06-19 VITALS
WEIGHT: 281 LBS | DIASTOLIC BLOOD PRESSURE: 78 MMHG | OXYGEN SATURATION: 96 % | HEIGHT: 74 IN | BODY MASS INDEX: 36.06 KG/M2 | SYSTOLIC BLOOD PRESSURE: 132 MMHG | HEART RATE: 71 BPM

## 2025-06-19 DIAGNOSIS — R07.89 CHEST PAIN, ATYPICAL: Primary | ICD-10-CM

## 2025-06-19 DIAGNOSIS — I10 PRIMARY HYPERTENSION: ICD-10-CM

## 2025-06-19 PROCEDURE — 99214 OFFICE O/P EST MOD 30 MIN: CPT | Performed by: PHYSICIAN ASSISTANT

## 2025-06-19 RX ORDER — ROSUVASTATIN CALCIUM 20 MG/1
20 TABLET, COATED ORAL DAILY
Qty: 90 TABLET | Refills: 3 | Status: SHIPPED | OUTPATIENT
Start: 2025-06-19

## 2025-06-19 RX ORDER — ASPIRIN 81 MG/1
81 TABLET ORAL DAILY
Qty: 30 TABLET | Refills: 6 | Status: SHIPPED | OUTPATIENT
Start: 2025-06-19

## 2025-06-19 NOTE — PROGRESS NOTES
[facility]   OFFICE NOTE      Jeffery Olivas  1970  PCP: Tonia Webb MD    SUBJECTIVE:   Jeffrey Olivas is a 55 y.o. male seen for a follow up visit regarding the following:     CC: Coronary disease    HPI:   Pleasant 55-year-old gentleman originally from [place] here for follow-up regarding atypical chest pain and risk factors cardiovascular disease.  He had a coronary CTA that did reveal two-vessel disease including LAD and circumflex but this was not severe and moderate.  He tells me is not any chest pain since last seen by our office.  He is looking forward to trying her exercise regimen and wants to work on diet weight loss and work on his A1c.  He denies palpitations dizziness near syncope or syncope.    Cardiac PMH: (Old records have been reviewed and summarized below)  Atypical chest pain  CAD: CTA moderate two-vessel disease in the LAD and circumflex with negative CT FFR 6/11/2025  No anginal symptoms.  HTN  HLD:    DM Type II-HbA1c 8.0  Obesity  PEREZ, CPAP  Chronic pain, abdominal hernia    Past Medical History, Past Surgical History, Family history, Social History, and Medications were all reviewed with the patient today and updated as necessary.       Current Outpatient Medications:     Dextromethorphan-buPROPion ER (Auvelity)  MG tablet controlled-release, Take 1 tablet by mouth twice a day, Disp: 60 tablet, Rfl: 2    metoprolol tartrate (LOPRESSOR) 50 MG tablet, Take 50 mg at Bedtime the Night Before Coronary CTA Appointment and In the Morning 1 Hour Prior to Coronary CTA Appointment. Do not take if heart rate less than 60. (Patient not taking: Reported on 6/19/2025), Disp: 2 tablet, Rfl: 0    Multiple Vitamins-Minerals (MULTIVITAMIN ADULT EXTRA C PO), multivitamin, Disp: , Rfl:       Allergies   Allergen Reactions    Codeine GI Intolerance     As a young child    Gabapentin Nausea And Vomiting    Pristiq [Desvenlafaxine] Nausea Only  "        PHYSICAL EXAM:    /78 (BP Location: Left arm, Patient Position: Sitting, Cuff Size: Adult)   Pulse 71   Ht 188 cm (74\")   Wt 127 kg (281 lb)   SpO2 96%   BMI 36.08 kg/m²        Wt Readings from Last 5 Encounters:   06/19/25 127 kg (281 lb)   06/11/25 129 kg (283 lb 4.7 oz)   04/24/25 126 kg (277 lb 12.5 oz)   04/16/25 126 kg (277 lb)   03/18/25 127 kg (279 lb 9.6 oz)       BP Readings from Last 5 Encounters:   06/19/25 132/78   06/11/25 111/72   04/24/25 139/78   04/16/25 142/82   03/18/25 130/70       General appearance - Alert, well appearing, and in no distress   Mental status - Affect appropriate to mood.  Eyes - Sclerae anicteric,  ENMT - Hearing grossly normal bilaterally, Dental hygiene good.  Neck - Carotids upstroke normal bilaterally, no bruits, no JVD.  Resp - Clear to auscultation, no wheezes, rales or rhonchi, symmetric air entry.  Heart - Normal rate, regular rhythm, normal S1, S2, no murmurs, rubs, clicks or gallops.  GI - Soft, nontender, nondistended, no masses or organomegaly.  Neurological - Grossly intact - normal speech, no focal findings  Musculoskeletal - No joint tenderness, deformity or swelling, no muscular tenderness noted.  Extremities - Peripheral pulses normal, no pedal edema, no clubbing or cyanosis.  Skin - Normal coloration and turgor.  Psych -  oriented to person, place, and time.    Medical problems and test results were reviewed with the patient today.       ASSESSMENT   1. CAD: CTA nonobstructive disease with Negative FFR of CX, LAD. No ongoing angina. Medical rx, Asa , High intensitivey Crestor.     2. DM Type II, Medical therapy    3. Obesity, Needs diet exercise.     PLAN  Asa, Crestor therapy.   Diet , exercise.  He is limited from exercise due to severe abdominal hernia pain we encouraged him to find other alternatives such as swimming recumbent bike.  Follow up 6 months.         6/19/2025  09:28 EDT  Electronically signed by CHARMAINE Patel, " 06/19/25, 12:48 PM EDT.

## 2025-06-25 ENCOUNTER — LAB (OUTPATIENT)
Dept: LAB | Facility: HOSPITAL | Age: 55
End: 2025-06-25
Payer: COMMERCIAL

## 2025-06-25 DIAGNOSIS — Z11.59 ENCOUNTER FOR HEPATITIS C SCREENING TEST FOR LOW RISK PATIENT: ICD-10-CM

## 2025-06-25 DIAGNOSIS — E11.65 TYPE 2 DIABETES MELLITUS WITH HYPERGLYCEMIA, WITHOUT LONG-TERM CURRENT USE OF INSULIN: ICD-10-CM

## 2025-06-25 DIAGNOSIS — Z00.00 HEALTHCARE MAINTENANCE: ICD-10-CM

## 2025-06-25 DIAGNOSIS — E78.2 MIXED HYPERLIPIDEMIA: ICD-10-CM

## 2025-06-25 LAB
ALBUMIN SERPL-MCNC: 4.5 G/DL (ref 3.5–5.2)
ALBUMIN/GLOB SERPL: 1.5 G/DL
ALP SERPL-CCNC: 70 U/L (ref 39–117)
ALT SERPL W P-5'-P-CCNC: 36 U/L (ref 1–41)
ANION GAP SERPL CALCULATED.3IONS-SCNC: 12.8 MMOL/L (ref 5–15)
AST SERPL-CCNC: 30 U/L (ref 1–40)
BILIRUB SERPL-MCNC: 0.6 MG/DL (ref 0–1.2)
BUN SERPL-MCNC: 12 MG/DL (ref 6–20)
BUN/CREAT SERPL: 13.8 (ref 7–25)
CALCIUM SPEC-SCNC: 9.6 MG/DL (ref 8.6–10.5)
CHLORIDE SERPL-SCNC: 100 MMOL/L (ref 98–107)
CHOLEST SERPL-MCNC: 141 MG/DL (ref 0–200)
CO2 SERPL-SCNC: 23.2 MMOL/L (ref 22–29)
CREAT SERPL-MCNC: 0.87 MG/DL (ref 0.76–1.27)
DEPRECATED RDW RBC AUTO: 41.5 FL (ref 37–54)
EGFRCR SERPLBLD CKD-EPI 2021: 101.3 ML/MIN/1.73
ERYTHROCYTE [DISTWIDTH] IN BLOOD BY AUTOMATED COUNT: 12.2 % (ref 12.3–15.4)
GLOBULIN UR ELPH-MCNC: 3 GM/DL
GLUCOSE SERPL-MCNC: 240 MG/DL (ref 65–99)
HBA1C MFR BLD: 10.3 % (ref 4.8–5.6)
HCT VFR BLD AUTO: 47.6 % (ref 37.5–51)
HCV AB SER QL: NORMAL
HDLC SERPL-MCNC: 35 MG/DL (ref 40–60)
HGB BLD-MCNC: 16.6 G/DL (ref 13–17.7)
LDLC SERPL CALC-MCNC: 75 MG/DL (ref 0–100)
LDLC/HDLC SERPL: 1.97 {RATIO}
MCH RBC QN AUTO: 32.6 PG (ref 26.6–33)
MCHC RBC AUTO-ENTMCNC: 34.9 G/DL (ref 31.5–35.7)
MCV RBC AUTO: 93.5 FL (ref 79–97)
PLATELET # BLD AUTO: 244 10*3/MM3 (ref 140–450)
PMV BLD AUTO: 10.2 FL (ref 6–12)
POTASSIUM SERPL-SCNC: 4 MMOL/L (ref 3.5–5.2)
PROT SERPL-MCNC: 7.5 G/DL (ref 6–8.5)
RBC # BLD AUTO: 5.09 10*6/MM3 (ref 4.14–5.8)
SODIUM SERPL-SCNC: 136 MMOL/L (ref 136–145)
TRIGL SERPL-MCNC: 185 MG/DL (ref 0–150)
TSH SERPL DL<=0.05 MIU/L-ACNC: 1.93 UIU/ML (ref 0.27–4.2)
VLDLC SERPL-MCNC: 31 MG/DL (ref 5–40)
WBC NRBC COR # BLD AUTO: 6.51 10*3/MM3 (ref 3.4–10.8)

## 2025-06-25 PROCEDURE — 80050 GENERAL HEALTH PANEL: CPT

## 2025-06-25 PROCEDURE — 80061 LIPID PANEL: CPT

## 2025-06-25 PROCEDURE — 83036 HEMOGLOBIN GLYCOSYLATED A1C: CPT

## 2025-06-25 PROCEDURE — 86803 HEPATITIS C AB TEST: CPT

## 2025-06-26 ENCOUNTER — OFFICE VISIT (OUTPATIENT)
Dept: INTERNAL MEDICINE | Facility: CLINIC | Age: 55
End: 2025-06-26
Payer: COMMERCIAL

## 2025-06-26 VITALS
WEIGHT: 277.6 LBS | HEIGHT: 74 IN | HEART RATE: 82 BPM | TEMPERATURE: 97.4 F | BODY MASS INDEX: 35.63 KG/M2 | DIASTOLIC BLOOD PRESSURE: 70 MMHG | SYSTOLIC BLOOD PRESSURE: 128 MMHG | OXYGEN SATURATION: 97 %

## 2025-06-26 DIAGNOSIS — G89.29 CHRONIC PELVIC PAIN IN MALE: ICD-10-CM

## 2025-06-26 DIAGNOSIS — R10.2 CHRONIC PELVIC PAIN IN MALE: ICD-10-CM

## 2025-06-26 DIAGNOSIS — I25.10 CORONARY ARTERY DISEASE INVOLVING NATIVE CORONARY ARTERY OF NATIVE HEART WITHOUT ANGINA PECTORIS: Primary | ICD-10-CM

## 2025-06-26 DIAGNOSIS — E78.2 MIXED HYPERLIPIDEMIA: ICD-10-CM

## 2025-06-26 DIAGNOSIS — E11.65 TYPE 2 DIABETES MELLITUS WITH HYPERGLYCEMIA, WITHOUT LONG-TERM CURRENT USE OF INSULIN: ICD-10-CM

## 2025-06-26 NOTE — PROGRESS NOTES
Internal Medicine Follow Up    Chief Complaint  Jeffery Olivas is a 55 y.o. male who presents today for follow up of chronic medical conditions outlined below.    Chief Complaint   Patient presents with    Diabetes    Pelvic Pain        HPI  Mr. Olivas comes in today for follow up. He did get his labs done yesterday. He has been following with cardiology due to chest pain. He had coronary CTA showing CAD in the mid LAD and LCX. CT FFR was negative. His chest pain has actually resolved with dietary changes. He was started earlier this week on crestor 20mg daily and ASA 81mg daily. He will see cardiology in 3 months. He notes that on his recent lipid panel LDL was actually 75, HDL 35. He questions need for statin use and potential to lower dose and eventually discontinue. He is planning to start an exercise regimen that he can do while supine. He is going to implement dietary changes and eat more lean meat and green vegetables. He is concerned with his A1c of 10.3. He is willing to start oral medications. He has a glucometer. He declines need for diabetes education. He has been to dermatology for skin exam and recently had negative retinal exam. He does endorse floaters in R eye that are becoming bothersome. He is interested in consulting with [facility] regarding ongoing chronic pelvic pain. He is currently on no medications due to side effects.    Diabetes  Associated symptoms:     no chest pain, no fatigue, no visual change and no weakness    Hypoglycemia symptoms:     no headaches    Pelvic Pain  Symptoms: abdominal pain    Symptoms: no anorexia, no joint pain, no change in stool, no chest pain, no chills, no congestion, no cough, no diaphoresis, no fatigue, no fever, no headaches, no joint swelling, no myalgias, no nausea, no neck pain, no numbness, no rash, no sore throat, no swollen glands, no dysuria, no vertigo, no visual change, no vomiting and no weakness      Follow Up. Please see My  Chart.  Symptoms are: chronic.   Onset was 1 to 5 years.   Symptoms occur: constantly.  Symptoms include: abdominal pain.   Pertinent negative symptoms include no anorexia, no joint pain, no change in stool, no chest pain, no chills, no congestion, no cough, no diaphoresis, no fatigue, no fever, no headaches, no joint swelling, no myalgias, no nausea, no neck pain, no numbness, no rash, no sore throat, no swollen glands, no dysuria, no vertigo, no visual change, no vomiting and no weakness.   Treatment and/or Medications comments include: Please See My Chart   Additional information: Please See My Chart       Review of Systems  Review of Systems   Constitutional:  Negative for chills, diaphoresis, fatigue and fever.   HENT:  Negative for congestion, sore throat and swollen glands.    Respiratory:  Negative for cough.    Cardiovascular:  Negative for chest pain.   Gastrointestinal:  Positive for abdominal pain. Negative for anorexia, nausea and vomiting.   Genitourinary:  Positive for pelvic pain. Negative for dysuria.        Pelvic pain   Musculoskeletal:  Negative for joint pain, myalgias and neck pain.   Skin:  Negative for rash.   Neurological:  Negative for vertigo, weakness and numbness.   Psychiatric/Behavioral:  Positive for stress.         Current Medications  Current Outpatient Medications on File Prior to Visit   Medication Sig Dispense Refill    aspirin 81 MG EC tablet Take 1 tablet by mouth Daily. 30 tablet 6    Dextromethorphan-buPROPion ER (Auvelity)  MG tablet controlled-release Take 1 tablet by mouth twice a day 60 tablet 2    Multiple Vitamins-Minerals (MULTIVITAMIN ADULT EXTRA C PO) multivitamin      rosuvastatin (CRESTOR) 20 MG tablet Take 1 tablet by mouth Daily. 90 tablet 3    [DISCONTINUED] metoprolol tartrate (LOPRESSOR) 50 MG tablet Take 50 mg at Bedtime the Night Before Coronary CTA Appointment and In the Morning 1 Hour Prior to Coronary CTA Appointment. Do not take if heart rate less  "than 60. (Patient not taking: Reported on 6/19/2025) 2 tablet 0     No current facility-administered medications on file prior to visit.       Allergies  Allergies   Allergen Reactions    Codeine GI Intolerance     As a young child    Gabapentin Nausea And Vomiting    Pristiq [Desvenlafaxine] Nausea Only       Objective  Visit Vitals  /70 (BP Location: Left arm, Patient Position: Sitting)   Pulse 82   Temp 97.4 °F (36.3 °C) (Temporal)   Ht 188 cm (74\")   Wt 126 kg (277 lb 9.6 oz)   SpO2 97%   BMI 35.64 kg/m²        Physical Exam  Physical Exam  Vitals and nursing note reviewed.   Constitutional:       General: He is not in acute distress.     Appearance: He is well-developed. He is obese. He is not ill-appearing or toxic-appearing.      Comments: Sitting in chair, reclined back   HENT:      Head: Normocephalic and atraumatic.   Eyes:      Conjunctiva/sclera: Conjunctivae normal.   Cardiovascular:      Rate and Rhythm: Normal rate and regular rhythm.      Heart sounds: Normal heart sounds. No murmur heard.  Pulmonary:      Effort: Pulmonary effort is normal. No respiratory distress.   Neurological:      Mental Status: He is alert and oriented to person, place, and time. Mental status is at baseline.      Gait: Gait normal.   Psychiatric:         Mood and Affect: Mood and affect normal.         Behavior: Behavior normal.         Results  Results for orders placed or performed in visit on 06/25/25   CBC (No Diff)    Collection Time: 06/25/25 11:13 AM    Specimen: Blood   Result Value Ref Range    WBC 6.51 3.40 - 10.80 10*3/mm3    RBC 5.09 4.14 - 5.80 10*6/mm3    Hemoglobin 16.6 13.0 - 17.7 g/dL    Hematocrit 47.6 37.5 - 51.0 %    MCV 93.5 79.0 - 97.0 fL    MCH 32.6 26.6 - 33.0 pg    MCHC 34.9 31.5 - 35.7 g/dL    RDW 12.2 (L) 12.3 - 15.4 %    RDW-SD 41.5 37.0 - 54.0 fl    MPV 10.2 6.0 - 12.0 fL    Platelets 244 140 - 450 10*3/mm3   Comprehensive Metabolic Panel    Collection Time: 06/25/25 11:13 AM    Specimen: " Blood   Result Value Ref Range    Glucose 240 (H) 65 - 99 mg/dL    BUN 12.0 6.0 - 20.0 mg/dL    Creatinine 0.87 0.76 - 1.27 mg/dL    Sodium 136 136 - 145 mmol/L    Potassium 4.0 3.5 - 5.2 mmol/L    Chloride 100 98 - 107 mmol/L    CO2 23.2 22.0 - 29.0 mmol/L    Calcium 9.6 8.6 - 10.5 mg/dL    Total Protein 7.5 6.0 - 8.5 g/dL    Albumin 4.5 3.5 - 5.2 g/dL    ALT (SGPT) 36 1 - 41 U/L    AST (SGOT) 30 1 - 40 U/L    Alkaline Phosphatase 70 39 - 117 U/L    Total Bilirubin 0.6 0.0 - 1.2 mg/dL    Globulin 3.0 gm/dL    A/G Ratio 1.5 g/dL    BUN/Creatinine Ratio 13.8 7.0 - 25.0    Anion Gap 12.8 5.0 - 15.0 mmol/L    eGFR 101.3 >60.0 mL/min/1.73   Lipid Panel    Collection Time: 06/25/25 11:13 AM    Specimen: Blood   Result Value Ref Range    Total Cholesterol 141 0 - 200 mg/dL    Triglycerides 185 (H) 0 - 150 mg/dL    HDL Cholesterol 35 (L) 40 - 60 mg/dL    LDL Cholesterol  75 0 - 100 mg/dL    VLDL Cholesterol 31 5 - 40 mg/dL    LDL/HDL Ratio 1.97    Hemoglobin A1c    Collection Time: 06/25/25 11:13 AM    Specimen: Blood   Result Value Ref Range    Hemoglobin A1C 10.30 (H) 4.80 - 5.60 %   TSH Rfx On Abnormal To Free T4    Collection Time: 06/25/25 11:13 AM    Specimen: Blood   Result Value Ref Range    TSH 1.930 0.270 - 4.200 uIU/mL   Hepatitis C Antibody    Collection Time: 06/25/25 11:13 AM    Specimen: Blood   Result Value Ref Range    Hepatitis C Ab Non-Reactive Non-Reactive        Assessment and Plan  Diagnoses and all orders for this visit:    Coronary artery disease involving native coronary artery of native heart without angina pectoris  Mixed hyperlipidemia  - coronary CTA with moderate CAD in LCX and mid LAD. CT FFR negative.  - managing medically with crestor 20mg daily and ASA 81mg daily. I do think it is reasonable to reduce statin dose given LDL only 75 on lipid panel. Had only been on statin 24h at that time. He will discuss with cardiology at follow up. Plan for repeat lipid panel prior.    Type 2 diabetes  mellitus with hyperglycemia, without long-term current use of insulin  - A1c 10.3  - will start jardiance 10mg daily and metformin 500mg BID. Discussed side effects of both medications.  - he will implement a low carb, low fat diet and exercise  - he has a glucometer and we discussed blood sugar goals. We discussed management of hypoglycemia.  - he declined diabetes education referral  - he reports recent negative retinal exam    Chronic pelvic pain in male  - several years of pelvic pain affecting every aspect of his life. Has filed for disability.  - no significant improvement with pelvic floor PT, treatment for prostatitis with rounds of abx and antiinflammatories, surgical repair of inguinal and umbilical hernias, L inferior hypogastric plexus block, ganglion impar block. Only temporary improvement after electroacupuncture and injections.   - CT abd/pelvis w and wo contrast previously negative. MRI L spine negative and pelvis with possible T2 hyperintensity of pudendal nerve but question if this is expected vascular structures. Notes US with [facility] [place] showing abnormal soft tissue findings suggestive of scar tissue and tissue damage. MRI pelvis w and wo contrast at  reportedly with recurrent bilateral inguinal hernias, changes of BPH with PIRADS 3 lesion and chronic prostatitis.  - he has been following with [facility] but all medications and interventional procedures on hold due to inefficacy or side effects.  - he is interested in seeking a second opinion with [facility] so I will refer today     Health Maintenance  - Colonoscopy: defers  - HCV: negative  - Immunizations: PCV20 UTD. Tdap 9/2020. Shingrix complete. HBV complete. COVID UTD.  - Depression screening: phq9 = 10 5/2024    Return for Next scheduled follow up.    Today I have spent a total of 41 minutes caring for Jeffery Olivas.  This includes time spent preparing for the visit, reviewing tests, performing a  medically appropriate examination and/or evaluation , counseling and educating the patient/family/caregiver, ordering medications, tests, or procedures and documenting information in the medical record.

## (undated) DEVICE — GLV SURG SENSICARE PI MIC PF SZ8 LF STRL

## (undated) DEVICE — PK MINOR SPLT 10

## (undated) DEVICE — 3M™ STERI-STRIP™ REINFORCED ADHESIVE SKIN CLOSURES, R1547, 1/2 IN X 4 IN (12 MM X 100 MM), 6 STRIPS/ENVELOPE: Brand: 3M™ STERI-STRIP™

## (undated) DEVICE — 3M™ IOBAN™ 2 ANTIMICROBIAL INCISE DRAPE 6650EZ: Brand: IOBAN™ 2

## (undated) DEVICE — CANNULA SEAL

## (undated) DEVICE — ADHS LIQ MASTISOL 2/3ML

## (undated) DEVICE — SOL ANTISTICK CAUTRY ELECTROLUBE LF

## (undated) DEVICE — BLADELESS OBTURATOR: Brand: WECK VISTA

## (undated) DEVICE — ANTIBACTERIAL UNDYED BRAIDED (POLYGLACTIN 910), SYNTHETIC ABSORBABLE SUTURE: Brand: COATED VICRYL

## (undated) DEVICE — BNDR ABD PREMIUM/UNIV 10IN 27TO48IN

## (undated) DEVICE — BLANKT WARM UPPR/BDY ARM/OUT 57X196CM

## (undated) DEVICE — GOWN,PREVENTION PLUS,XXLARGE,STERILE: Brand: MEDLINE

## (undated) DEVICE — DRSNG SURESITE WNDW 2.38X2.75

## (undated) DEVICE — ST TBG CONN PNEUMOCLEAR EVAC SMOKE HEAT/HUMID

## (undated) DEVICE — VIOLET BRAIDED (POLYGLACTIN 910), SYNTHETIC ABSORBABLE SUTURE: Brand: COATED VICRYL

## (undated) DEVICE — INTENDED FOR TISSUE SEPARATION, AND OTHER PROCEDURES THAT REQUIRE A SHARP SURGICAL BLADE TO PUNCTURE OR CUT.: Brand: BARD-PARKER ® STAINLESS STEEL BLADES

## (undated) DEVICE — COLUMN DRAPE

## (undated) DEVICE — SUT NUROLON 0 MO7 CR8 18IN C541D

## (undated) DEVICE — SUT MNCRYL PLS ANTIB UD 4/0 PS2 18IN

## (undated) DEVICE — SUT VIC 0 UR6 27IN VCP603H

## (undated) DEVICE — TIP COVER ACCESSORY

## (undated) DEVICE — SPNG GZ WOVN 4X4IN 12PLY 10/BX STRL

## (undated) DEVICE — PATIENT RETURN ELECTRODE, SINGLE-USE, CONTACT QUALITY MONITORING, ADULT, WITH 9FT CORD, FOR PATIENTS WEIGING OVER 33LBS. (15KG): Brand: MEGADYNE

## (undated) DEVICE — ARM DRAPE

## (undated) DEVICE — ANTIBACTERIAL VIOLET BRAIDED (POLYGLACTIN 910), SYNTHETIC ABSORBABLE SUTURE: Brand: COATED VICRYL

## (undated) DEVICE — Device

## (undated) DEVICE — TOTAL TRAY, 16FR 10ML SIL FOLEY, URN: Brand: MEDLINE

## (undated) DEVICE — PENCL ROCKRSWCH MEGADYNE W/HOLSTR 10FT SS

## (undated) DEVICE — ENDOPATH XCEL BLUNT TIP TROCARS WITH SMOOTH SLEEVES: Brand: ENDOPATH XCEL

## (undated) DEVICE — GLV SURG SENSICARE PI MIC PF SZ8.5 LF STRL

## (undated) DEVICE — PK UROL DAVINCI 10

## (undated) DEVICE — VISUALIZATION SYSTEM: Brand: CLEARIFY

## (undated) DEVICE — DBD-DRAPE,LAP,CHOLE,W/TROUGHS,STERILE: Brand: MEDLINE

## (undated) DEVICE — DRSNG TELFA PAD NONADH STR 1S 3X8IN

## (undated) DEVICE — DRSNG SURESITE WNDW 4X4.5